# Patient Record
Sex: FEMALE | Race: OTHER | NOT HISPANIC OR LATINO | URBAN - METROPOLITAN AREA
[De-identification: names, ages, dates, MRNs, and addresses within clinical notes are randomized per-mention and may not be internally consistent; named-entity substitution may affect disease eponyms.]

---

## 2017-07-04 ENCOUNTER — INPATIENT (INPATIENT)
Facility: HOSPITAL | Age: 72
LOS: 1 days | Discharge: HOME | DRG: 566 | End: 2017-07-06
Attending: INTERNAL MEDICINE | Admitting: HOSPITALIST
Payer: MEDICAID

## 2017-07-04 VITALS
RESPIRATION RATE: 16 BRPM | DIASTOLIC BLOOD PRESSURE: 80 MMHG | TEMPERATURE: 100 F | SYSTOLIC BLOOD PRESSURE: 150 MMHG | OXYGEN SATURATION: 96 % | HEART RATE: 84 BPM

## 2017-07-04 LAB
ALBUMIN SERPL ELPH-MCNC: 4.4 G/DL — SIGNIFICANT CHANGE UP (ref 3.3–5)
ALP SERPL-CCNC: 146 U/L — HIGH (ref 40–120)
ALT FLD-CCNC: 38 U/L RC — SIGNIFICANT CHANGE UP (ref 10–45)
ANION GAP SERPL CALC-SCNC: 14 MMOL/L — SIGNIFICANT CHANGE UP (ref 5–17)
APPEARANCE UR: CLEAR — SIGNIFICANT CHANGE UP
AST SERPL-CCNC: 29 U/L — SIGNIFICANT CHANGE UP (ref 10–40)
BASOPHILS # BLD AUTO: 0.1 K/UL — SIGNIFICANT CHANGE UP (ref 0–0.2)
BASOPHILS NFR BLD AUTO: 0.5 % — SIGNIFICANT CHANGE UP (ref 0–2)
BILIRUB SERPL-MCNC: 0.6 MG/DL — SIGNIFICANT CHANGE UP (ref 0.2–1.2)
BILIRUB UR-MCNC: NEGATIVE — SIGNIFICANT CHANGE UP
BUN SERPL-MCNC: 13 MG/DL — SIGNIFICANT CHANGE UP (ref 7–23)
CALCIUM SERPL-MCNC: 10.1 MG/DL — SIGNIFICANT CHANGE UP (ref 8.4–10.5)
CHLORIDE SERPL-SCNC: 102 MMOL/L — SIGNIFICANT CHANGE UP (ref 96–108)
CO2 SERPL-SCNC: 23 MMOL/L — SIGNIFICANT CHANGE UP (ref 22–31)
COLOR SPEC: YELLOW — SIGNIFICANT CHANGE UP
CREAT SERPL-MCNC: 1.22 MG/DL — SIGNIFICANT CHANGE UP (ref 0.5–1.3)
DIFF PNL FLD: NEGATIVE — SIGNIFICANT CHANGE UP
EOSINOPHIL # BLD AUTO: 0 K/UL — SIGNIFICANT CHANGE UP (ref 0–0.5)
EOSINOPHIL NFR BLD AUTO: 0.3 % — SIGNIFICANT CHANGE UP (ref 0–6)
EPI CELLS # UR: SIGNIFICANT CHANGE UP /HPF
GAS PNL BLDV: SIGNIFICANT CHANGE UP
GLUCOSE SERPL-MCNC: 125 MG/DL — HIGH (ref 70–99)
GLUCOSE UR QL: NEGATIVE — SIGNIFICANT CHANGE UP
HCT VFR BLD CALC: 42.4 % — SIGNIFICANT CHANGE UP (ref 34.5–45)
HGB BLD-MCNC: 14.5 G/DL — SIGNIFICANT CHANGE UP (ref 11.5–15.5)
KETONES UR-MCNC: NEGATIVE — SIGNIFICANT CHANGE UP
LEUKOCYTE ESTERASE UR-ACNC: NEGATIVE — SIGNIFICANT CHANGE UP
LYMPHOCYTES # BLD AUTO: 1.4 K/UL — SIGNIFICANT CHANGE UP (ref 1–3.3)
LYMPHOCYTES # BLD AUTO: 12.3 % — LOW (ref 13–44)
MCHC RBC-ENTMCNC: 32.7 PG — SIGNIFICANT CHANGE UP (ref 27–34)
MCHC RBC-ENTMCNC: 34.3 GM/DL — SIGNIFICANT CHANGE UP (ref 32–36)
MCV RBC AUTO: 95.5 FL — SIGNIFICANT CHANGE UP (ref 80–100)
MONOCYTES # BLD AUTO: 0.9 K/UL — SIGNIFICANT CHANGE UP (ref 0–0.9)
MONOCYTES NFR BLD AUTO: 8.2 % — SIGNIFICANT CHANGE UP (ref 2–14)
NEUTROPHILS # BLD AUTO: 8.6 K/UL — HIGH (ref 1.8–7.4)
NEUTROPHILS NFR BLD AUTO: 78.7 % — HIGH (ref 43–77)
NITRITE UR-MCNC: NEGATIVE — SIGNIFICANT CHANGE UP
PH UR: 6.5 — SIGNIFICANT CHANGE UP (ref 5–8)
PLATELET # BLD AUTO: 281 K/UL — SIGNIFICANT CHANGE UP (ref 150–400)
POTASSIUM SERPL-MCNC: 4 MMOL/L — SIGNIFICANT CHANGE UP (ref 3.5–5.3)
POTASSIUM SERPL-SCNC: 4 MMOL/L — SIGNIFICANT CHANGE UP (ref 3.5–5.3)
PROT SERPL-MCNC: 7.9 G/DL — SIGNIFICANT CHANGE UP (ref 6–8.3)
PROT UR-MCNC: NEGATIVE — SIGNIFICANT CHANGE UP
RBC # BLD: 4.44 M/UL — SIGNIFICANT CHANGE UP (ref 3.8–5.2)
RBC # FLD: 11.1 % — SIGNIFICANT CHANGE UP (ref 10.3–14.5)
SODIUM SERPL-SCNC: 139 MMOL/L — SIGNIFICANT CHANGE UP (ref 135–145)
SP GR SPEC: 1.01 — SIGNIFICANT CHANGE UP (ref 1.01–1.02)
UROBILINOGEN FLD QL: NEGATIVE — SIGNIFICANT CHANGE UP
WBC # BLD: 11 K/UL — HIGH (ref 3.8–10.5)
WBC # FLD AUTO: 11 K/UL — HIGH (ref 3.8–10.5)

## 2017-07-04 PROCEDURE — 76377 3D RENDER W/INTRP POSTPROCES: CPT | Mod: 26

## 2017-07-04 PROCEDURE — 99285 EMERGENCY DEPT VISIT HI MDM: CPT

## 2017-07-04 PROCEDURE — 73502 X-RAY EXAM HIP UNI 2-3 VIEWS: CPT | Mod: 26,LT

## 2017-07-04 PROCEDURE — 74177 CT ABD & PELVIS W/CONTRAST: CPT | Mod: 26

## 2017-07-04 PROCEDURE — 72192 CT PELVIS W/O DYE: CPT | Mod: 26,59

## 2017-07-04 RX ORDER — DIPHENHYDRAMINE HCL 50 MG
50 CAPSULE ORAL ONCE
Qty: 0 | Refills: 0 | Status: COMPLETED | OUTPATIENT
Start: 2017-07-04 | End: 2017-07-04

## 2017-07-04 RX ORDER — ACETAMINOPHEN 500 MG
1000 TABLET ORAL ONCE
Qty: 0 | Refills: 0 | Status: COMPLETED | OUTPATIENT
Start: 2017-07-04 | End: 2017-07-04

## 2017-07-04 RX ORDER — SODIUM CHLORIDE 9 MG/ML
1000 INJECTION INTRAMUSCULAR; INTRAVENOUS; SUBCUTANEOUS ONCE
Qty: 0 | Refills: 0 | Status: COMPLETED | OUTPATIENT
Start: 2017-07-04 | End: 2017-07-04

## 2017-07-04 RX ADMIN — Medication 400 MILLIGRAM(S): at 20:29

## 2017-07-04 RX ADMIN — Medication 1000 MILLIGRAM(S): at 21:00

## 2017-07-04 RX ADMIN — Medication 50 MILLIGRAM(S): at 23:57

## 2017-07-04 RX ADMIN — SODIUM CHLORIDE 1000 MILLILITER(S): 9 INJECTION INTRAMUSCULAR; INTRAVENOUS; SUBCUTANEOUS at 20:29

## 2017-07-04 NOTE — ED ADULT NURSE NOTE - OBJECTIVE STATEMENT
70 y/o F presents to ED c/o sudden onset L groin pain when she woke up, 7/10, has been constant, worse with movement, pt reports when she first woke up she could not walk, pt does not use any assistive devices with ambulation normally, pt now able to walk independently but with increased pain to L groin with ambulation. L groin pain increases when pt is laying in stretcher and tries to straighten leg, pt prefers L knee to be bent for comfort. L groin/LLQ TTP. Pt denies headache, dizziness, chest pain, palpitations, cough, SOB, abdominal pain, n/v/d, urinary symptoms, fevers, chills, weakness at this time. Pt has pos and equal sensation to extremities bilat, pos and equal strength to extremities x 4, strong peripheral pulses x 4, no numbness/tingling. Family at bedside, safety maintained. Pt drinking for CT.

## 2017-07-04 NOTE — ED PROVIDER NOTE - OBJECTIVE STATEMENT
71 year old female past medical history hyperlipidemia (diet controlled), presents to the ED for left hip pain. woke up today with left sided groin pain, 7/10, non-radiating, "pain," worse with movement, better when knee is bent, worse when straightening leg.  no associated paresthesias. No associated swelling, rash. No trauma. Patient visiting from [place], was doing a lot of errands yesterday. No fevers, nausea, vomiting, back pain, abdominal pain.     PCP: Binh Strickladn

## 2017-07-04 NOTE — ED PROVIDER NOTE - ATTENDING CONTRIBUTION TO CARE
attending Rin: 71yF h/o HLD with one day of atraumatic L hip pain. Denies fever/chills. Ambulating but with pain. No pain meds prior to arrival. On examination, uncomfortable 2/2 pain, 2+ pedal pulses b/l, warm lower extremities with <2 sec cap refill, limited ROM L hip, tenderness over lateral L hip, pelvis stable, mild LLQ tenderness, no hernias, no rashes. Rectal temperature 99.9 after IV Tylenol so possible fever upon arrival? Concern for abdominal process with referred L hip pain vs L hip pathology (fx vs septic joint?). Will obtain labwork, urinalysis, CTAP, pelvis xrays, analgesia and likely admission.

## 2017-07-04 NOTE — ED PROVIDER NOTE - MEDICAL DECISION MAKING DETAILS
71 year old female past medical history hyperlipidemia (diet controlled), presents to the ED for left hip pain. Atraumatic, also tender left lower quadrant, unclear if left hip pain vs left lower quadrant pain. Will obtain labwork, xray pelvis if no obvious fracture will CT A/P and of hip. pain control.

## 2017-07-04 NOTE — ED ADULT NURSE NOTE - CHPI ED SYMPTOMS NEG
no nausea/no fever/no vomiting/no burning urination/no chills/no hematuria/no abdominal distension/no dysuria/no blood in stool/no diarrhea

## 2017-07-04 NOTE — ED PROVIDER NOTE - PROGRESS NOTE DETAILS
after IV contrast patient reports she feels itchy on her head. Do not suspect allergic reaction. No shortness of breath, nausea, vomiting, swelling, abdominal pain, rash. Will give Benadryl and reeval. Patient was endorsed to me by Dr. Gar at the usual hour of signout to follow up results of ct scan / orthopedics consult and dispo pending these results and re-evaluation. At this time the patient  remains unable to walk and with pain to rom of L hip. Will give abx if they rec. admit to medicine. -Rk Lora MD- primary medical doctor paged    Ortho recommending admission for r/o septic joint vs occult fracture with MRI, recommending holding abx in case for bx

## 2017-07-05 DIAGNOSIS — Z29.9 ENCOUNTER FOR PROPHYLACTIC MEASURES, UNSPECIFIED: ICD-10-CM

## 2017-07-05 DIAGNOSIS — M25.552 PAIN IN LEFT HIP: ICD-10-CM

## 2017-07-05 DIAGNOSIS — E78.5 HYPERLIPIDEMIA, UNSPECIFIED: ICD-10-CM

## 2017-07-05 DIAGNOSIS — R26.2 DIFFICULTY IN WALKING, NOT ELSEWHERE CLASSIFIED: ICD-10-CM

## 2017-07-05 LAB
APTT BLD: 32.1 SEC — SIGNIFICANT CHANGE UP (ref 27.5–37.4)
INR BLD: 1.06 RATIO — SIGNIFICANT CHANGE UP (ref 0.88–1.16)
PROTHROM AB SERPL-ACNC: 11.5 SEC — SIGNIFICANT CHANGE UP (ref 9.8–12.7)

## 2017-07-05 PROCEDURE — 93010 ELECTROCARDIOGRAM REPORT: CPT

## 2017-07-05 PROCEDURE — 72197 MRI PELVIS W/O & W/DYE: CPT | Mod: 26

## 2017-07-05 PROCEDURE — 99223 1ST HOSP IP/OBS HIGH 75: CPT | Mod: AI

## 2017-07-05 PROCEDURE — 12345: CPT | Mod: NC

## 2017-07-05 RX ORDER — ENOXAPARIN SODIUM 100 MG/ML
30 INJECTION SUBCUTANEOUS EVERY 24 HOURS
Qty: 0 | Refills: 0 | Status: DISCONTINUED | OUTPATIENT
Start: 2017-07-05 | End: 2017-07-06

## 2017-07-05 RX ORDER — IBUPROFEN 200 MG
400 TABLET ORAL THREE TIMES A DAY
Qty: 0 | Refills: 0 | Status: DISCONTINUED | OUTPATIENT
Start: 2017-07-05 | End: 2017-07-06

## 2017-07-05 RX ORDER — IBUPROFEN 200 MG
400 TABLET ORAL
Qty: 0 | Refills: 0 | Status: COMPLETED | OUTPATIENT
Start: 2017-07-05 | End: 2017-07-06

## 2017-07-05 RX ADMIN — ENOXAPARIN SODIUM 30 MILLIGRAM(S): 100 INJECTION SUBCUTANEOUS at 06:36

## 2017-07-05 RX ADMIN — Medication 400 MILLIGRAM(S): at 17:44

## 2017-07-05 RX ADMIN — Medication 400 MILLIGRAM(S): at 17:14

## 2017-07-05 NOTE — PROGRESS NOTE ADULT - ASSESSMENT
71 F Hx HLD (diet controlled) p/w acute L hip pain admitted for difficulty ambulation due to acute L hip pain, improving

## 2017-07-05 NOTE — H&P ADULT - ASSESSMENT
71 F Hx HLD p/w acute L hip pain admitted difficulty ambulation due to acute L hip pain, to rule out septic joint and for pain control. 71 F Hx HLD (diet controlled) p/w acute L hip pain admitted for difficulty ambulation due to acute L hip pain, to rule out septic joint and for pain control.

## 2017-07-05 NOTE — CHART NOTE - NSCHARTNOTEFT_GEN_A_CORE
Hospitalist Attending Addendum:  Spoke to ortho s/p MRI.  Due to elevated ESR, small effusion on left hip, ortho recommended IR guided aspiration.  Will arrange.

## 2017-07-05 NOTE — H&P ADULT - HISTORY OF PRESENT ILLNESS
71 F Hx HLD p/w L hip pain 71 F Hx HLD (diet controlled) p/w acute L hip pain.  Patient was in her usual state of health, no limitation in mobility, physically active, jogs regularly, visited daughter in [place] for a moth, returned home to [place] July 1, went to a park to play basketball with grandkids on July 3 for 30 min.  Patient woke up the morning of July 4 with severe 8/10 L hip pain, radiation to medial/inner thigh, worsen with movement, improved w/o movement, resulting in inability to ambulate, came to ED for further evaluation.  No trauma, no fever/chill, no manipulation or injection of L hip.

## 2017-07-05 NOTE — PROGRESS NOTE ADULT - SUBJECTIVE AND OBJECTIVE BOX
PAVEL LEYVA  71y  Female      Patient is a 71y old  Female who presents with a chief complaint of L hip pain (2017 04:45)      INTERVAL HPI/OVERNIGHT EVENTS: No acute events overngiht.  Patient states pain much improved. Pain localized to left groin area, worse when going from sitting to standing position, now able to bear weight.  Patient denies fever, chills, chest pain, SOB, abdominal pain, n/v/d/c, dysuria.  Translated through son at bedside.    VS:   T(C): 37.2 (17 @ 13:44), Max: 37.8 (17 @ 18:54)  HR: 71 (17 @ 13:44) (61 - 85)  BP: 133/75 (17 @ 13:44) (128/75 - 172/87)  RR: 16 (17 @ 13:44) (16 - 18)  SpO2: 96% (17 @ 13:44) (96% - 99%)  Wt(kg): --Vital Signs Last 24 Hrs  T(C): 37.2 (2017 13:44), Max: 37.8 (2017 18:54)  T(F): 99 (2017 13:44), Max: 100 (2017 18:54)  HR: 71 (2017 13:44) (61 - 85)  BP: 133/75 (2017 13:44) (128/75 - 172/87)  BP(mean): --  RR: 16 (:44) (16 - 18)  SpO2: 96% (:44) (96% - 99%)  Wt(kg): --    PHYSICAL EXAM:  GENERAL: NAD, well-groomed, well-developed  HEAD:  Atraumatic, Normocephalic  EYES: EOMI, PERRLA, conjunctiva and sclera clear  ENMT: No tonsillar erythema, exudates,; Moist mucous membranes. No lesions  NECK: Supple, No JVD, Normal thyroid  CHEST/LUNG: Clear to percussion bilaterally; No rales, rhonchi, wheezing, or rubs  HEART: Regular rate and rhythm; No murmurs, rubs, or gallops  ABDOMEN: Soft, Nontender, Nondistended; Bowel sounds present.  No hepatosplenomegaly  EXTREMITIES:  2+ Peripheral Pulses, No clubbing, cyanosis, or edema  MSK: No lateral trochanter tenderness to palpation.  No pain to palpation upon palpation in groin area.  Ambulated patient personally.  Pain with standing.  Able to bear weight with minimal pain to left groin area.     NERVOUS SYSTEM:  ; Motor Strength 5/5 B/L upper and lower extremities.      Consultant(s) Notes Reviewed:  [x ] YES  [ ] NO  Care Discussed with Consultants/Other Providers [ x] YES  [ ] NO: Orthopedic resident    LABS:                        14.5   11.0  )-----------( 281      ( 2017 21:03 )             42.4     07-    139  |  102  |  13  ----------------------------<  125<H>  4.0   |  23  |  1.22    Ca    10.1      2017 21:03    TPro  7.9  /  Alb  4.4  /  TBili  0.6  /  DBili  x   /  AST  29  /  ALT  38  /  AlkPhos  146<H>  07-04      Urinalysis Basic - ( 2017 20:22 )    Color: Yellow / Appearance: Clear / S.010 / pH: x  Gluc: x / Ketone: Negative  / Bili: Negative / Urobili: Negative   Blood: x / Protein: Negative / Nitrite: Negative   Leuk Esterase: Negative / RBC: x / WBC x   Sq Epi: x / Non Sq Epi: OCC /HPF / Bacteria: x    Urinalysis Basic - ( 2017 20:22 )    Color: Yellow / Appearance: Clear / S.010 / pH: x  Gluc: x / Ketone: Negative  / Bili: Negative / Urobili: Negative   Blood: x / Protein: Negative / Nitrite: Negative   Leuk Esterase: Negative / RBC: x / WBC x   Sq Epi: x / Non Sq Epi: OCC /HPF / Bacteria: x        RADIOLOGY & ADDITIONAL TESTS:    Imaging Personally Reviewed:  [ ] YES  [ ] NO: CT pelvis: Small left hip effusion.  No acute fracture.

## 2017-07-05 NOTE — PROGRESS NOTE ADULT - PROBLEM SELECTOR PLAN 1
likely exacerbated by basketball the day prior which she hasn't play for over 10 yrs, possible bursitis   - s/p IV acetaminophen 1gm and ibuprofen with significant improvement with ability to ambulate.  - discussed with orthopedics, MRI performed, they will review scan for possibel septic joint, though given improvement overall presentation, low on differential.

## 2017-07-05 NOTE — H&P ADULT - PROBLEM SELECTOR PLAN 1
likely exacerbated by basketball the day prior which she hasn't play for over 10 yrs, possible bursitis   - s/p IV acetaminophen 1gm with significant improvement of pain and ROM, only mild pain in L groin with weight bearing and ambulation currently  - will control pain with Motrin (w/ food) prn  - Ortho eval appreciated, will check MRI to r/o septic joint with elevated ESR and L hip effusion  - will recheck ESR in am

## 2017-07-05 NOTE — ED ADULT NURSE REASSESSMENT NOTE - GENERAL PATIENT STATE
comfortable appearance/improvement verbalized/cooperative/smiling/interactive/resting/sleeping/family/SO at bedside

## 2017-07-05 NOTE — ED ADULT NURSE REASSESSMENT NOTE - NS ED NURSE REASSESS COMMENT FT1
Pt AAOx4, NAD, resp nonlabored, resting comfortably in bed with family at bedside. Pt c/o 0/10 L groin pain at rest, 5/10 with movement. Pt able to ambulate but has increased pain to L groin with ambulation. Pt has pos and equal sensation to extremities bilat, strong peripheral pulses x 4, no numbness/tingling.  Pt denies headache, dizziness, chest pain, palpitations, SOB, abd pain, n/v/d, urinary/bowel symptoms, fevers, chills, weakness at this time. Pt awaiting dispo/admit. Ortho just finished consult at bedside. Safety maintained. Family at bedside. Pt AAOx4, NAD, resp nonlabored, resting comfortably in bed with family at bedside. Pt c/o 0/10 L groin pain at rest, 5/10 with movement. Pt able to ambulate but has increased pain to L groin with ambulation. Pt has pos and equal sensation to extremities bilat, strong peripheral pulses x 4, no numbness/tingling. Pt reports mild itchiness to back of head after CT scan with IV contrast, MD Grajeda aware, will medicate pt as ordered. No SOB, dyspnea, hives/redness, throat/tongue swelling/itchiness, or itchiness to body noted, only itchiness on back of head. Pt denies headache, dizziness, chest pain, palpitations, SOB, abd pain, n/v/d, urinary/bowel symptoms, fevers, chills, weakness at this time. Pt awaiting dispo/admit. Ortho just finished consult at bedside. Safety maintained. Family at bedside. Pt AAOx4, NAD, resp nonlabored, resting comfortably in bed with family at bedside. Pt c/o 0/10 L groin pain at rest, 5/10 with movement. Pt able to ambulate but has increased pain to L groin with ambulation. Pt has pos and equal sensation to extremities bilat, strong peripheral pulses x 4, no numbness/tingling. Pt reports mild itchiness to back of head after CT scan with IV contrast, MD Grajeda aware, will medicate pt as ordered. No SOB, dyspnea, hives/redness, throat/tongue swelling/itchiness, or itchiness to body noted, only itchiness on back of head. Pt denies headache, dizziness, chest pain, palpitations, SOB, abd pain, n/v/d, urinary/bowel symptoms, fevers, chills, weakness at this time. Pt awaiting dispo/admit. Ortho just finished consult at bedside. Safety maintained. Family at bedside.    0110: Pt reports improvement in posterior scalp itchiness. No other changes observed. Report given to ED Holding HERBER Roberts. Safety maintained. Pt AAOx4, NAD, resp nonlabored, resting comfortably in bed with family at bedside. Pt c/o 0/10 L groin pain at rest, 5/10 with movement. Pt able to ambulate but has increased pain to L groin with ambulation. Pt has pos and equal sensation to extremities bilat, strong peripheral pulses x 4, no numbness/tingling. Pt reports mild itchiness to back of head after CT scan with IV contrast, MD Grajeda aware, will medicate pt as ordered. No SOB, dyspnea, hives/redness, throat/tongue swelling/itchiness, or itchiness to body noted, only itchiness on back of head. Pt denies headache, dizziness, chest pain, palpitations, SOB, abd pain, n/v/d, urinary/bowel symptoms, fevers, chills, weakness at this time. Pt awaiting dispo/admit. Ortho just finished consult at bedside. Safety maintained. Family at bedside.    0110: Pt reports improvement in posterior scalp itchiness. No other changes observed. Report given to Eric Julio RN. Safety maintained. Pt awaiting transport to floor.

## 2017-07-05 NOTE — CONSULT NOTE ADULT - SUBJECTIVE AND OBJECTIVE BOX
General
Orthopaedic Surgery Consult Note    Patient is a 71y old  Female who presents with a chief complaint of   HPI:      PAST MEDICAL & SURGICAL HISTORY:  No pertinent past medical history    [] No significant past history as reviewed with the patient and family    FAMILY HISTORY:    [] Family history not pertinent as reviewed with the patient and family    SOCIAL HISTORY:    MEDICATIONS  (STANDING):    MEDICATIONS  (PRN):    Allergies    No Known Allergies    Intolerances        REVIEW OF SYSTEMS  [x] All review of systems negative except for those marked.  Systemic:	[] Fever		[] Chills		[] Night sweats		[] Fatigue	[] Other  [] Cardiovascular:  [] Pulmonary:  [] Renal/Urologic:  [] Gastrointestinal:  [] Metabolic:  [] Neurologic:  [] Hematologic:  [] ENT:  [] Ophthalmologic:  [] Musculoskeletal: see HPI    Vital Signs Last 24 Hrs  T(C): 37.2 (2017 23:59), Max: 37.8 (2017 18:54)  T(F): 98.9 (2017 23:59), Max: 100 (2017 18:54)  HR: 68 (2017 23:59) (68 - 85)  BP: 149/83 (2017 23:59) (149/83 - 172/87)  BP(mean): --  RR: 18 (2017 23:59) (16 - 18)  SpO2: 97% (2017 23:59) (96% - 98%)      PHYSICAL EXAM:  NAD  LLE: ER/IR 0-15, Flex 30; No erythema;  No TTP over greater troch  EHL/FHL/TA/GS intact  SILT DP/SP/Leslie/Sa  WWP Dp palpable                          14.5   11.0  )-----------( 281      ( 2017 21:03 )             42.4     07-04    139  |  102  |  13  ----------------------------<  125<H>  4.0   |  23  |  1.22    Ca    10.1      2017 21:03    TPro  7.9  /  Alb  4.4  /  TBili  0.6  /  DBili  x   /  AST  29  /  ALT  38  /  AlkPhos  146<H>  07-04      Urinalysis Basic - ( 2017 20:22 )    Color: Yellow / Appearance: Clear / S.010 / pH: x  Gluc: x / Ketone: Negative  / Bili: Negative / Urobili: Negative   Blood: x / Protein: Negative / Nitrite: Negative   Leuk Esterase: Negative / RBC: x / WBC x   Sq Epi: x / Non Sq Epi: OCC /HPF / Bacteria: x      ESR 51  IMAGING STUDIES:  X-ray pelvis/left hip: no fracture or dislocation  CT Pelvis: no fracture or dislocation; Diverticulosis    71y Female with left hip pain and inability to ambulate.  Possible left septic hip.   Recommend MRI to rule out effusion  Pain Control  WBAT  Will follow  DVT PPX

## 2017-07-06 VITALS
RESPIRATION RATE: 18 BRPM | TEMPERATURE: 98 F | SYSTOLIC BLOOD PRESSURE: 143 MMHG | OXYGEN SATURATION: 96 % | DIASTOLIC BLOOD PRESSURE: 78 MMHG | HEART RATE: 63 BPM

## 2017-07-06 LAB
ANION GAP SERPL CALC-SCNC: 13 MMOL/L — SIGNIFICANT CHANGE UP (ref 5–17)
BUN SERPL-MCNC: 16 MG/DL — SIGNIFICANT CHANGE UP (ref 7–23)
CALCIUM SERPL-MCNC: 9.7 MG/DL — SIGNIFICANT CHANGE UP (ref 8.4–10.5)
CHLORIDE SERPL-SCNC: 105 MMOL/L — SIGNIFICANT CHANGE UP (ref 96–108)
CO2 SERPL-SCNC: 23 MMOL/L — SIGNIFICANT CHANGE UP (ref 22–31)
CREAT SERPL-MCNC: 1.06 MG/DL — SIGNIFICANT CHANGE UP (ref 0.5–1.3)
ERYTHROCYTE [SEDIMENTATION RATE] IN BLOOD: 71 MM/HR — HIGH (ref 0–20)
GLUCOSE SERPL-MCNC: 88 MG/DL — SIGNIFICANT CHANGE UP (ref 70–99)
HCT VFR BLD CALC: 43 % — SIGNIFICANT CHANGE UP (ref 34.5–45)
HGB BLD-MCNC: 14.1 G/DL — SIGNIFICANT CHANGE UP (ref 11.5–15.5)
INR BLD: 1.04 RATIO — SIGNIFICANT CHANGE UP (ref 0.88–1.16)
MCHC RBC-ENTMCNC: 31.5 PG — SIGNIFICANT CHANGE UP (ref 27–34)
MCHC RBC-ENTMCNC: 32.8 GM/DL — SIGNIFICANT CHANGE UP (ref 32–36)
MCV RBC AUTO: 96.1 FL — SIGNIFICANT CHANGE UP (ref 80–100)
PLATELET # BLD AUTO: 272 K/UL — SIGNIFICANT CHANGE UP (ref 150–400)
POTASSIUM SERPL-MCNC: 4 MMOL/L — SIGNIFICANT CHANGE UP (ref 3.5–5.3)
POTASSIUM SERPL-SCNC: 4 MMOL/L — SIGNIFICANT CHANGE UP (ref 3.5–5.3)
PROTHROM AB SERPL-ACNC: 11.2 SEC — SIGNIFICANT CHANGE UP (ref 9.8–12.7)
RBC # BLD: 4.47 M/UL — SIGNIFICANT CHANGE UP (ref 3.8–5.2)
RBC # FLD: 11.2 % — SIGNIFICANT CHANGE UP (ref 10.3–14.5)
SODIUM SERPL-SCNC: 141 MMOL/L — SIGNIFICANT CHANGE UP (ref 135–145)
WBC # BLD: 5.5 K/UL — SIGNIFICANT CHANGE UP (ref 3.8–10.5)
WBC # FLD AUTO: 5.5 K/UL — SIGNIFICANT CHANGE UP (ref 3.8–10.5)

## 2017-07-06 PROCEDURE — 10022: CPT

## 2017-07-06 PROCEDURE — 76942 ECHO GUIDE FOR BIOPSY: CPT | Mod: 26

## 2017-07-06 PROCEDURE — 99239 HOSP IP/OBS DSCHRG MGMT >30: CPT

## 2017-07-06 RX ORDER — IBUPROFEN 200 MG
1 TABLET ORAL
Qty: 30 | Refills: 0
Start: 2017-07-06 | End: 2017-07-16

## 2017-07-06 RX ADMIN — Medication 400 MILLIGRAM(S): at 05:06

## 2017-07-06 RX ADMIN — ENOXAPARIN SODIUM 30 MILLIGRAM(S): 100 INJECTION SUBCUTANEOUS at 05:49

## 2017-07-06 NOTE — DISCHARGE NOTE ADULT - CARE PLAN
Principal Discharge DX:	Hip pain, acute, left  Goal:	S/P aspiration of left hip  Instructions for follow-up, activity and diet:	Continue Motrin as directed and f/u with orthopedics in one week Principal Discharge DX:	Hip pain, acute, left  Goal:	S/P aspiration of left hip  Assessment and plan of treatment:	Continue Motrin as directed and f/u with orthopedics in one week.

## 2017-07-06 NOTE — PROGRESS NOTE ADULT - SUBJECTIVE AND OBJECTIVE BOX
IR aspiration performed today - spoke w/IR attending - a scant amount of fluid removed. Only enough to be sent for culture. No surgical intervention at this time unless cultures positive.

## 2017-07-06 NOTE — DISCHARGE NOTE ADULT - PROVIDER TOKENS
FREE:[LAST:[Orthopedic surgery],PHONE:[(   )    -],FAX:[(   )    -],ADDRESS:[[address] [place] [address]  Phone: [phone]  Fax: [phone]]] TOKEN:'[id number]'

## 2017-07-06 NOTE — PHYSICAL THERAPY INITIAL EVALUATION ADULT - PERTINENT HX OF CURRENT PROBLEM, REHAB EVAL
71 F Hx HLD (diet controlled) p/w acute L hip pain admitted for difficulty ambulation due to acute L hip pain, to rule out septic joint and for pain control. XR of Hip [07/04]:  no fracture or dislocation. CT Pelvis: [07/04]:  no fracture or dislocation; Diverticulosis.

## 2017-07-06 NOTE — DISCHARGE NOTE ADULT - MEDICATION SUMMARY - MEDICATIONS TO TAKE
I will START or STAY ON the medications listed below when I get home from the hospital:    ibuprofen 400 mg oral tablet  -- 1 tab(s) by mouth 3 times a day, As needed, moderate pain  -- Indication: For Hip pain, acute, left

## 2017-07-06 NOTE — DISCHARGE NOTE ADULT - PLAN OF CARE
S/P aspiration of left hip Continue Motrin as directed and f/u with orthopedics in one week Continue Motrin as directed and f/u with orthopedics in one week.

## 2017-07-06 NOTE — PROGRESS NOTE ADULT - SUBJECTIVE AND OBJECTIVE BOX
Interventional Radiology  Pre-Procedure Note    HPI:  71 F Hx HLD (diet controlled) p/w acute L hip pain, referred for left hip joint aspiration.  Patient was in her usual state of health, no limitation in mobility, physically active, jogs regularly, visited daughter in [place] for a moth, returned home to [place] July 1, went to a park to play basketball with grandkids on July 3 for 30 min.  Patient woke up the morning of July 4 with severe 8/10 L hip pain, radiation to medial/inner thigh, worsen with movement, improved w/o movement, resulting in inability to ambulate, came to ED for further evaluation.  No trauma, no fever/chill, no manipulation or injection of L hip. (05 Jul 2017 04:45)        PAST MEDICAL & SURGICAL HISTORY:  HLD (hyperlipidemia)  No significant past surgical history      Social History:     FAMILY HISTORY:  No pertinent family history in first degree relatives      Allergies: No Known Allergies      Current Medications: ibuprofen  Tablet 400 milliGRAM(s) Oral three times a day PRN  enoxaparin Injectable 30 milliGRAM(s) SubCutaneous every 24 hours      Labs:                         14.1   5.5   )-----------( 272      ( 06 Jul 2017 06:55 )             43.0       07-06    141  |  105  |  16  ----------------------------<  88  4.0   |  23  |  1.06    Ca    9.7      06 Jul 2017 06:55    TPro  7.9  /  Alb  4.4  /  TBili  0.6  /  DBili  x   /  AST  29  /  ALT  38  /  AlkPhos  146<H>  07-04    MRI pelvis demonstrated moderate left hip joint effusion.      Assessment/Plan:   71 year old female referred for left hip aspiration to rule out septic arthritis.

## 2017-07-06 NOTE — DISCHARGE NOTE ADULT - CARE PROVIDER_API CALL
Orthopedic surgery,   [address] [place] [id number]  Phone: [phone]  Fax: [phone]  Phone: (   )    -  Fax: (   )    - Jass Samuel), Orthopaedic Surgery  [address] [place] [id number]  Phone: [phone]  Fax: [phone]

## 2017-07-06 NOTE — PROGRESS NOTE ADULT - SUBJECTIVE AND OBJECTIVE BOX
Interventional Radiology Brief- Operative Note    Procedure: Left hip aspiration    Operators: Nasima    Anesthesia (type): 1% lidocaine local    Contrast: none    EBL:none     Findings/Follow up Plan of Care: US guided FNA of left hip effusion performed. Small amount of clear pink fluid able to be aspirated. Sample sent to microbiology.    Specimens Removed: as above    Implants:none    Complications:none    Condition/Disposition: stable/room    Please call Interventional Radiology x [phone] with any questions, concerns, or issues.

## 2017-07-06 NOTE — PROGRESS NOTE ADULT - SUBJECTIVE AND OBJECTIVE BOX
Discussed w/Dr Samuel - pt OK to leave with close follow up next week and the cultures followed by the primary team.

## 2017-07-06 NOTE — PROGRESS NOTE ADULT - SUBJECTIVE AND OBJECTIVE BOX
HPI:  71 F Hx a/w left hip pain found to have left hip joint effusion elevated ESR / CRP presented to IR for left hip aspiration.    Allergies:No Known Allergies      PAST MEDICAL & SURGICAL HISTORY:  HLD (hyperlipidemia)  No significant past surgical history        Pertinent labs:                      14.1   5.5   )-----------( 272      ( 06 Jul 2017 06:55 )             43.0   07-06    141  |  105  |  16  ----------------------------<  88  4.0   |  23  |  1.06    Ca    9.7      06 Jul 2017 06:55    TPro  7.9  /  Alb  4.4  /  TBili  0.6  /  DBili  x   /  AST  29  /  ALT  38  /  AlkPhos  146<H>  07-04  PT/INR - ( 05 Jul 2017 17:50 )   PT: 11.5 sec;   INR: 1.06 ratio         PTT - ( 05 Jul 2017 17:50 )  PTT:32.1 sec    Consent: Procedure/risks/ Benefits explained. Informed consent obtained. Pt verbalizes understanding.

## 2017-07-06 NOTE — DISCHARGE NOTE ADULT - CONDITIONS AT DISCHARGE
Patient safety maintained. Patient VSS. Patient IV removed with no signs/symptoms of redness/swelling.

## 2017-07-06 NOTE — DISCHARGE NOTE ADULT - PATIENT PORTAL LINK FT
“You can access the [facility] Patient Portal, offered by [facility], by registering with the following website: [email or web address]”

## 2017-12-15 PROCEDURE — 87075 CULTR BACTERIA EXCEPT BLOOD: CPT

## 2017-12-15 PROCEDURE — 83605 ASSAY OF LACTIC ACID: CPT

## 2017-12-15 PROCEDURE — 74177 CT ABD & PELVIS W/CONTRAST: CPT

## 2017-12-15 PROCEDURE — 87205 SMEAR GRAM STAIN: CPT

## 2017-12-15 PROCEDURE — 82435 ASSAY OF BLOOD CHLORIDE: CPT

## 2017-12-15 PROCEDURE — 76377 3D RENDER W/INTRP POSTPROCES: CPT

## 2017-12-15 PROCEDURE — 85610 PROTHROMBIN TIME: CPT

## 2017-12-15 PROCEDURE — 84132 ASSAY OF SERUM POTASSIUM: CPT

## 2017-12-15 PROCEDURE — 82803 BLOOD GASES ANY COMBINATION: CPT

## 2017-12-15 PROCEDURE — 81001 URINALYSIS AUTO W/SCOPE: CPT

## 2017-12-15 PROCEDURE — 82947 ASSAY GLUCOSE BLOOD QUANT: CPT

## 2017-12-15 PROCEDURE — 80048 BASIC METABOLIC PNL TOTAL CA: CPT

## 2017-12-15 PROCEDURE — 96374 THER/PROPH/DIAG INJ IV PUSH: CPT | Mod: XU

## 2017-12-15 PROCEDURE — 85014 HEMATOCRIT: CPT

## 2017-12-15 PROCEDURE — 85730 THROMBOPLASTIN TIME PARTIAL: CPT

## 2017-12-15 PROCEDURE — 72197 MRI PELVIS W/O & W/DYE: CPT

## 2017-12-15 PROCEDURE — 10022: CPT

## 2017-12-15 PROCEDURE — 87070 CULTURE OTHR SPECIMN AEROBIC: CPT

## 2017-12-15 PROCEDURE — A9585: CPT

## 2017-12-15 PROCEDURE — 85652 RBC SED RATE AUTOMATED: CPT

## 2017-12-15 PROCEDURE — 72192 CT PELVIS W/O DYE: CPT

## 2017-12-15 PROCEDURE — 82330 ASSAY OF CALCIUM: CPT

## 2017-12-15 PROCEDURE — 73502 X-RAY EXAM HIP UNI 2-3 VIEWS: CPT

## 2017-12-15 PROCEDURE — 84295 ASSAY OF SERUM SODIUM: CPT

## 2017-12-15 PROCEDURE — 86140 C-REACTIVE PROTEIN: CPT

## 2017-12-15 PROCEDURE — 80053 COMPREHEN METABOLIC PANEL: CPT

## 2017-12-15 PROCEDURE — 76942 ECHO GUIDE FOR BIOPSY: CPT

## 2017-12-15 PROCEDURE — 97161 PT EVAL LOW COMPLEX 20 MIN: CPT

## 2017-12-15 PROCEDURE — 85027 COMPLETE CBC AUTOMATED: CPT

## 2017-12-15 PROCEDURE — 99285 EMERGENCY DEPT VISIT HI MDM: CPT | Mod: 25

## 2017-12-15 PROCEDURE — 93005 ELECTROCARDIOGRAM TRACING: CPT

## 2022-02-02 ENCOUNTER — INPATIENT (INPATIENT)
Facility: HOSPITAL | Age: 77
LOS: 0 days | Discharge: HOME | DRG: 92 | End: 2022-02-03
Attending: STUDENT IN AN ORGANIZED HEALTH CARE EDUCATION/TRAINING PROGRAM | Admitting: STUDENT IN AN ORGANIZED HEALTH CARE EDUCATION/TRAINING PROGRAM
Payer: MEDICARE

## 2022-02-02 VITALS
TEMPERATURE: 99 F | HEIGHT: 61.42 IN | DIASTOLIC BLOOD PRESSURE: 95 MMHG | SYSTOLIC BLOOD PRESSURE: 208 MMHG | OXYGEN SATURATION: 95 % | HEART RATE: 89 BPM | WEIGHT: 125 LBS | RESPIRATION RATE: 18 BRPM

## 2022-02-02 LAB
ALBUMIN SERPL ELPH-MCNC: 3.9 G/DL — SIGNIFICANT CHANGE UP (ref 3.3–5)
ALP SERPL-CCNC: 173 U/L — HIGH (ref 40–120)
ALT FLD-CCNC: 21 U/L — SIGNIFICANT CHANGE UP (ref 10–45)
ANION GAP SERPL CALC-SCNC: 12 MMOL/L — SIGNIFICANT CHANGE UP (ref 5–17)
AST SERPL-CCNC: 27 U/L — SIGNIFICANT CHANGE UP (ref 10–40)
BASOPHILS # BLD AUTO: 0.03 K/UL — SIGNIFICANT CHANGE UP (ref 0–0.2)
BASOPHILS NFR BLD AUTO: 0.9 % — SIGNIFICANT CHANGE UP (ref 0–2)
BILIRUB SERPL-MCNC: 0.4 MG/DL — SIGNIFICANT CHANGE UP (ref 0.2–1.2)
BUN SERPL-MCNC: 8 MG/DL — SIGNIFICANT CHANGE UP (ref 7–23)
CALCIUM SERPL-MCNC: 9.5 MG/DL — SIGNIFICANT CHANGE UP (ref 8.4–10.5)
CHLORIDE SERPL-SCNC: 96 MMOL/L — SIGNIFICANT CHANGE UP (ref 96–108)
CK SERPL-CCNC: 40 U/L — SIGNIFICANT CHANGE UP (ref 25–170)
CO2 SERPL-SCNC: 24 MMOL/L — SIGNIFICANT CHANGE UP (ref 22–31)
CREAT SERPL-MCNC: 0.87 MG/DL — SIGNIFICANT CHANGE UP (ref 0.5–1.3)
D DIMER BLD IA.RAPID-MCNC: 435 NG/ML DDU — HIGH
EOSINOPHIL # BLD AUTO: 0 K/UL — SIGNIFICANT CHANGE UP (ref 0–0.5)
EOSINOPHIL NFR BLD AUTO: 0 % — SIGNIFICANT CHANGE UP (ref 0–6)
FLUAV AG NPH QL: SIGNIFICANT CHANGE UP
FLUBV AG NPH QL: SIGNIFICANT CHANGE UP
GIANT PLATELETS BLD QL SMEAR: PRESENT — SIGNIFICANT CHANGE UP
GLUCOSE SERPL-MCNC: 116 MG/DL — HIGH (ref 70–99)
HCT VFR BLD CALC: 37.3 % — SIGNIFICANT CHANGE UP (ref 34.5–45)
HGB BLD-MCNC: 12.4 G/DL — SIGNIFICANT CHANGE UP (ref 11.5–15.5)
LYMPHOCYTES # BLD AUTO: 2.88 K/UL — SIGNIFICANT CHANGE UP (ref 1–3.3)
LYMPHOCYTES # BLD AUTO: 82.7 % — HIGH (ref 13–44)
MAGNESIUM SERPL-MCNC: 2.2 MG/DL — SIGNIFICANT CHANGE UP (ref 1.6–2.6)
MANUAL SMEAR VERIFICATION: SIGNIFICANT CHANGE UP
MCHC RBC-ENTMCNC: 29 PG — SIGNIFICANT CHANGE UP (ref 27–34)
MCHC RBC-ENTMCNC: 33.2 GM/DL — SIGNIFICANT CHANGE UP (ref 32–36)
MCV RBC AUTO: 87.4 FL — SIGNIFICANT CHANGE UP (ref 80–100)
MONOCYTES # BLD AUTO: 0.54 K/UL — SIGNIFICANT CHANGE UP (ref 0–0.9)
MONOCYTES NFR BLD AUTO: 15.5 % — HIGH (ref 2–14)
NEUTROPHILS # BLD AUTO: 0.03 K/UL — LOW (ref 1.8–7.4)
NEUTROPHILS NFR BLD AUTO: 0.9 % — LOW (ref 43–77)
PHOSPHATE SERPL-MCNC: 3.1 MG/DL — SIGNIFICANT CHANGE UP (ref 2.5–4.5)
PLAT MORPH BLD: NORMAL — SIGNIFICANT CHANGE UP
PLATELET # BLD AUTO: 293 K/UL — SIGNIFICANT CHANGE UP (ref 150–400)
POTASSIUM SERPL-MCNC: 3.8 MMOL/L — SIGNIFICANT CHANGE UP (ref 3.5–5.3)
POTASSIUM SERPL-SCNC: 3.8 MMOL/L — SIGNIFICANT CHANGE UP (ref 3.5–5.3)
PROT SERPL-MCNC: 8.3 G/DL — SIGNIFICANT CHANGE UP (ref 6–8.3)
RBC # BLD: 4.27 M/UL — SIGNIFICANT CHANGE UP (ref 3.8–5.2)
RBC # FLD: 12.6 % — SIGNIFICANT CHANGE UP (ref 10.3–14.5)
RBC BLD AUTO: SIGNIFICANT CHANGE UP
RSV RNA NPH QL NAA+NON-PROBE: SIGNIFICANT CHANGE UP
SARS-COV-2 RNA SPEC QL NAA+PROBE: SIGNIFICANT CHANGE UP
SODIUM SERPL-SCNC: 132 MMOL/L — LOW (ref 135–145)
TROPONIN T, HIGH SENSITIVITY RESULT: 6 NG/L — SIGNIFICANT CHANGE UP (ref 0–51)
WBC # BLD: 3.48 K/UL — LOW (ref 3.8–10.5)
WBC # FLD AUTO: 3.48 K/UL — LOW (ref 3.8–10.5)

## 2022-02-02 PROCEDURE — 99285 EMERGENCY DEPT VISIT HI MDM: CPT

## 2022-02-02 PROCEDURE — 74174 CTA ABD&PLVS W/CONTRAST: CPT | Mod: 26,MA

## 2022-02-02 PROCEDURE — 71275 CT ANGIOGRAPHY CHEST: CPT | Mod: 26,MA

## 2022-02-02 NOTE — ED PROVIDER NOTE - CHIEF COMPLAINT
The patient is a 76y Female complaining of  The patient is a 76y Female complaining of bilateral leg pain.

## 2022-02-02 NOTE — ED PROVIDER NOTE - CLINICAL SUMMARY MEDICAL DECISION MAKING FREE TEXT BOX
76F w/ PMH of HTN (reportedly not on any medications), HLD presenting to the ED for bilateral lower extremity pain. No visible lesions or hx of trauma to the area; CTA and CT chest ordered earlier without PE nor AAA pathology; labs within normal limits, possible vascular component given worsening pain with ambulation; vascular consult for PAD, possibly ?neuropathy but denies hx of DM vs radiculopathy

## 2022-02-02 NOTE — ED PROVIDER NOTE - OBJECTIVE STATEMENT
76F w/ PMH of HTN (reportedly not on any medications), HLD presenting to the ED for bilateral lower extremity pain. Patient states she has been unable to ambulate 2/2 to pain for 1.5 weeks. She has pain when she stands that worsens after walking. No pain while patient is sitting or laying down. Had an episode of LUE pain while showering a few days ago. Denies fevers/chills, chest pain, difficulty breathing, abdominal pain, changes in urination or bowel movements. No history of DM. Denies back pain nor recent falls/trauma to the legs and no wounds/cuts on the leg. Took baby ASA a couple days ago for the pain.

## 2022-02-02 NOTE — CONSULT NOTE ADULT - SUBJECTIVE AND OBJECTIVE BOX
VASCULAR SURGERY CONSULT NOTE  --------------------------------------------------------------------------------------------    Patient is a 76y old  Female who presents with a chief complaint of bilateral lower extremity pain    HPI: 76F w/ PMH of HTN (reportedly not on any medications), HLD presenting to the ED for bilateral lower extremity pain. Patient states she has been unable to ambulate 2/2 to pain for 1.5 weeks. She has pain when she stands that worsens after walking. No pain while patient is sitting or laying down. Had an episode of LUE pain while showering a few days ago. Denies fevers/chills, chest pain, difficulty breathing, abdominal pain, changes in urination or bowel movements. No history of DM. Denies back pain nor recent falls/trauma to the legs and no wounds/cuts on the leg. Took baby ASA a couple days ago for the pain.    Vascular Surgery consulted to evaluate for claudication-like symptoms  Patient endorses pain in bilateral lower extremities for past 1 week, worse when going from sitting to standing and with walking, better with rest   Pain feels like a shooting pain to the back of her knees   Has never had this kind of pain till 1 week ago   Denies rest pain, trauma/falls, wounds   Never smoker  Not medically treating HTN or HLD  Ambulates independently in community   Independent in ADLs    ROS: 10-system review is otherwise negative except HPI above.      PAST MEDICAL & SURGICAL HISTORY:  HLD (hyperlipidemia)  No significant past surgical history    FAMILY HISTORY:  No pertinent family history in first degree relatives    ALLERGIES: No Known Allergies    CURRENT MEDICATIONS  MEDICATIONS (STANDING):   MEDICATIONS (PRN):  --------------------------------------------------------------------------------------------    Vitals:   T(C): 36.7 (02-02-22 @ 20:43), Max: 37.2 (02-02-22 @ 17:51)  HR: 76 (02-02-22 @ 20:43) (76 - 89)  BP: 161/74 (02-02-22 @ 20:43) (161/74 - 208/95)  RR: 16 (02-02-22 @ 20:43) (16 - 18)  SpO2: 97% (02-02-22 @ 20:43) (95% - 97%)  CAPILLARY BLOOD GLUCOSE    Height (cm): 156 (02-02 @ 17:51)  Weight (kg): 56.7 (02-02 @ 17:51)  BMI (kg/m2): 23.3 (02-02 @ 17:51)  BSA (m2): 1.55 (02-02 @ 17:51)    PHYSICAL EXAM:   General: NAD, Lying in bed comfortably  Neuro: A+Ox3  Cardio: RRR  Resp: Good effort  Vascular: All 4 extremities warm, B/l radial pulses palpable, b/l Femoral/pop/DP/PT pulse palpable, no palpable pulsatile abdominal mass  Musculoskeletal: All 4 extremities moving spontaneously, no limitations  --------------------------------------------------------------------------------------------    LABS  CBC (02-02 @ 18:37)                              12.4                           3.48<L>  )----------------(  293        0.9<L>% Neutrophils, 82.7<H>% Lymphocytes, ANC: 0.03<L>                              37.3      BMP (02-02 @ 18:37)             132<L>  |  96      |  8     		Ca++ --      Ca 9.5                ---------------------------------( 116<H>		Mg 2.2                3.8     |  24      |  0.87  			Ph 3.1       LFTs (02-02 @ 18:37)      TPro 8.3 / Alb 3.9 / TBili 0.4 / DBili -- / AST 27 / ALT 21 / AlkPhos 173<H>      Cardiac Markers (02-02 @ 18:37)     HSTrop: -- / CKMB: -- / CK: 40  --------------------------------------------------------------------------------------------  IMAGING  < from: CT Angio Chest Aorta w/wo IV Cont (02.02.22 @ 18:41) >  FINDINGS:  CHEST:  LUNGS AND LARGE AIRWAYS: Patent central airways. No pulmonary nodules.   There is mild linear atelectasis or scarring at the right lung base  PLEURA: No pleural effusion.  VESSELS: Atherosclerotic calcification of aorta. No evidence of   dissection or aneurysm. Pulmonary arteries enhance normally.  HEART: Heart size is normal. No pericardial effusion.  MEDIASTINUM AND DIONI: No lymphadenopathy.  CHEST WALL AND LOWER NECK: Within normal limits.    ABDOMEN AND PELVIS:  LIVER: Within normal limits.  BILE DUCTS: Normal caliber.  GALLBLADDER: Within normal limits.  SPLEEN: Within normal limits.  PANCREAS: Within normal limits.  ADRENALS: Within normal limits.  KIDNEYS/URETERS: Within normal limits.    BLADDER: Within normal limits.  REPRODUCTIVE ORGANS: Uterus and adnexa within normal limits.    BOWEL: No bowel obstruction. Appendix is normal.  PERITONEUM: No ascites.  VESSELS: Atherosclerotic changes. No evidence of dissection. Normal   enhancement of the aorta and its branches. There is mild narrowing in the   proximal superior mesenteric artery  RETROPERITONEUM/LYMPH NODES: No lymphadenopathy.  ABDOMINAL WALL: Within normal limits.  BONES: degenerative changes. 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 A bone   island is seen in the left 11th posterior rib    IMPRESSION:  No evidence of aortic dissection. No evidence of pulmonary embolism.    No acute pathology in the chest, abdomen or pelvis       VASCULAR SURGERY CONSULT NOTE  --------------------------------------------------------------------------------------------    Patient is a 76y old  Female who presents with a chief complaint of bilateral lower extremity pain    HPI: 76F w/ PMH of HTN (reportedly not on any medications), HLD presenting to the ED for bilateral lower extremity pain. Patient states she has been unable to ambulate 2/2 to pain for 1.5 weeks. She has pain when she stands that worsens after walking. No pain while patient is sitting or laying down. Had an episode of LUE pain while showering a few days ago. Denies fevers/chills, chest pain, difficulty breathing, abdominal pain, changes in urination or bowel movements. No history of DM. Denies back pain nor recent falls/trauma to the legs and no wounds/cuts on the leg. Took baby ASA a couple days ago for the pain.    Vascular Surgery consulted to evaluate for claudication-like symptoms  Patient endorses pain in bilateral lower extremities for past 1 week, worse when going from sitting to standing and with walking, better with rest   Pain feels like shooting pain to the back of her knees   Has never had this kind of pain till 1 week ago   Denies rest pain, trauma/falls, wounds, back pain   Never smoker  Not medically treating HTN or HLD  Ambulates independently in community   Independent in ADLs    ROS: 10-system review is otherwise negative except HPI above.      PAST MEDICAL & SURGICAL HISTORY:  HLD (hyperlipidemia)  No significant past surgical history    FAMILY HISTORY:  No pertinent family history in first degree relatives    ALLERGIES: No Known Allergies    CURRENT MEDICATIONS  MEDICATIONS (STANDING):   MEDICATIONS (PRN):  --------------------------------------------------------------------------------------------    Vitals:   T(C): 36.7 (02-02-22 @ 20:43), Max: 37.2 (02-02-22 @ 17:51)  HR: 76 (02-02-22 @ 20:43) (76 - 89)  BP: 161/74 (02-02-22 @ 20:43) (161/74 - 208/95)  RR: 16 (02-02-22 @ 20:43) (16 - 18)  SpO2: 97% (02-02-22 @ 20:43) (95% - 97%)  CAPILLARY BLOOD GLUCOSE    Height (cm): 156 (02-02 @ 17:51)  Weight (kg): 56.7 (02-02 @ 17:51)  BMI (kg/m2): 23.3 (02-02 @ 17:51)  BSA (m2): 1.55 (02-02 @ 17:51)    PHYSICAL EXAM:   General: NAD, Lying in bed comfortably  Neuro: A+Ox3  Cardio: RRR  Resp: Good effort  Vascular: All 4 extremities warm, B/l radial pulses palpable, b/l Femoral/pop/DP/PT pulse palpable, no palpable pulsatile abdominal mass  Musculoskeletal: All 4 extremities moving spontaneously, no limitations  --------------------------------------------------------------------------------------------    LABS  CBC (02-02 @ 18:37)                              12.4                           3.48<L>  )----------------(  293        0.9<L>% Neutrophils, 82.7<H>% Lymphocytes, ANC: 0.03<L>                              37.3      BMP (02-02 @ 18:37)             132<L>  |  96      |  8     		Ca++ --      Ca 9.5                ---------------------------------( 116<H>		Mg 2.2                3.8     |  24      |  0.87  			Ph 3.1       LFTs (02-02 @ 18:37)      TPro 8.3 / Alb 3.9 / TBili 0.4 / DBili -- / AST 27 / ALT 21 / AlkPhos 173<H>      Cardiac Markers (02-02 @ 18:37)     HSTrop: -- / CKMB: -- / CK: 40  --------------------------------------------------------------------------------------------  IMAGING  < from: CT Angio Chest Aorta w/wo IV Cont (02.02.22 @ 18:41) >  FINDINGS:  CHEST:  LUNGS AND LARGE AIRWAYS: Patent central airways. No pulmonary nodules.   There is mild linear atelectasis or scarring at the right lung base  PLEURA: No pleural effusion.  VESSELS: Atherosclerotic calcification of aorta. No evidence of   dissection or aneurysm. Pulmonary arteries enhance normally.  HEART: Heart size is normal. No pericardial effusion.  MEDIASTINUM AND DIONI: No lymphadenopathy.  CHEST WALL AND LOWER NECK: Within normal limits.    ABDOMEN AND PELVIS:  LIVER: Within normal limits.  BILE DUCTS: Normal caliber.  GALLBLADDER: Within normal limits.  SPLEEN: Within normal limits.  PANCREAS: Within normal limits.  ADRENALS: Within normal limits.  KIDNEYS/URETERS: Within normal limits.    BLADDER: Within normal limits.  REPRODUCTIVE ORGANS: Uterus and adnexa within normal limits.    BOWEL: No bowel obstruction. Appendix is normal.  PERITONEUM: No ascites.  VESSELS: Atherosclerotic changes. No evidence of dissection. Normal   enhancement of the aorta and its branches. There is mild narrowing in the   proximal superior mesenteric artery  RETROPERITONEUM/LYMPH NODES: No lymphadenopathy.  ABDOMINAL WALL: Within normal limits.  BONES: degenerative changes. 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 A bone   island is seen in the left 11th posterior rib    IMPRESSION:  No evidence of aortic dissection. No evidence of pulmonary embolism.    No acute pathology in the chest, abdomen or pelvis

## 2022-02-02 NOTE — ED ADULT NURSE NOTE - NS ED NURSE REPORT GIVEN TO FT
Endoscopy Procedure Note    Date of procedure:   05/09/2018    Indications: abdominal pain nausea vomiting    Pre-operative Diagnosis: abdominal pain nausea vomiting    Post-operative Diagnosis: Same    Surgeon: hCai Reese MD    Assistants: none    Anesthesia: General endotracheal anesthesia    ASA Class: 3    Procedure Details   The patient was seen in the Holding Room. The risks, benefits, complications, treatment options, and expected outcomes were discussed with the patient. The possibilities of reaction to medication, pulmonary aspiration, perforation of viscus, bleeding, recurrent infection, the need for additional procedures, failure to diagnose a condition, and creating a complication requiring transfusion or operation were discussed with the patient. The patient concurred with the proposed plan, giving informed consent. The site of surgery properly noted/marked. The patient was taken to Operating Room 7 identified as Yodit Canseco and the procedure verified as endoscopy stent removal. A Time Out was held and the above information confirmed.    Full general anesthesia was induced with orotracheal intubation. The patient was prepped and draped in a supine position. Appropriate antibiotics were given intravenously. Arms were out.    Endoscopy was used to remove previous stent with rat tooth graspers under fluoroscopy.  Esophagus was normal hemoclip had already fallen off esophagus.  NJ tube was removed was stent removed.      NJ tube then replaced with fluoro and was confirmed to be in the distal small bowel with direct visualization.  IT was taped in place.    Instrument, sponge, and needle counts were correct prior to wound closure and at the conclusion of the case.     Findings:  Healing sleeve    Estimated Blood Loss: 1.0 cc    Drains: none    Total IV Fluids: 1000 ml    Specimens: none    Implants: none    Complications:  None; patient tolerated the procedure well.    Disposition: PACU -  hemodynamically stable.    Condition: stable    Attending Attestation: I was present and scrubbed for the entire procedure.       zhane barrow holding

## 2022-02-02 NOTE — ED PROVIDER NOTE - NS ED ROS FT
REVIEW OF SYSTEMS:    CONSTITUTIONAL: No fevers, chills, fatigue, weakness  HEENT: No loss of vision, blurry vision. No sore throat  CARDIOVASCULAR: No chest pain. No palpitations  RESPIRATORY: No shortness of breath, cough  GASTROINTESTINAL: No nausea, vomiting, diarrhea, constipation. No abdominal pain  GENITOURINARY:  No hematuria, polyuria, dysuria  SKIN: No rashes, itching  MUSCULOSKELETAL: +b/l lower extremity pain, mainly In lower legs (below knee, above feet), worsens with movement; denies weakness in lower extremities  PSYCHIATRIC: No history of depression or anxiety  NEUROLOGICAL: No headaches, dizziness. No change in bowel or bladder control  ENDOCRINOLOGIC: No reports of cold or heat intolerance. No polydipsia  ALLERGIES: No history of asthma, hives, or eczema

## 2022-02-02 NOTE — ED ADULT TRIAGE NOTE - CHIEF COMPLAINT QUOTE
Sent in to r/o DVT  B/L leg pain  and unable to walk without pain  Pt has high blood pressure no mediation   pt does not want to tale medicaine

## 2022-02-02 NOTE — ED PROVIDER NOTE - PROGRESS NOTE DETAILS
Binh Hernandez, PGY2: Patient seen and examined in ED; b/l lower extremity weakness, no visible lesions or hx of trauma to the area; CTA and CT chest ordered earlier without PE nor AAA pathology; labs within normal limits, possible vascular component given worsening pain with ambulation iBnh Hernandez, PGY2: ortho recs appreciated; MRI can be done outpatient, management of pain with NSAIDs and Tylenol Binh Hernandez, PGY2: Vascular consult appreciated; possibly neurogenic claudication for patient's pain, cont pain management and possible repeat imaging; ortho consult Binh Hernandez, PGY2: ortho recs appreciated; MRI can be done outpatient, management of pain with NSAIDs and Tylenol. Pt still unable to ambulate well after receiving pain medications 2/2 to pain; takes 3-4 steps before needing to sit down.

## 2022-02-02 NOTE — ED ADULT NURSE REASSESSMENT NOTE - NS ED NURSE REASSESS COMMENT FT1
Handoff report received from HERBER Cronin. Pt resting comfortably in [address] 3. Pt A&O x 4, VSS. Pt denies any pain or needs at this time. Pt pending US. Bed in lowest position, side rails up and call bell in reach.

## 2022-02-02 NOTE — ED PROVIDER NOTE - RAPID ASSESSMENT
76 F with PMHx of HTN - non compliant presents with bilateral lower extremity pain. Reports unable stand or ambulate secondary to pain x1.5 week. Denies chest pain, however, had an episode few days ago of LUE pain while showering. Denies hx of DM, back pain, or recent falls. Endorses dizziness when waking up each morning. Endorsed baby ASA couple of days ago.     Scribe Statement: Janine DAMON Tiffany, attest that this documentation has been prepared under the direction and in the presence of Pierre Reid) 76 F with PMHx of HTN - non compliant presents with bilateral lower extremity pain. Reports unable stand or ambulate secondary to pain x1.5 week. Denies chest pain, however, had an episode few days ago of LUE pain while showering. Denies hx of DM, back pain, or recent falls. Endorses dizziness when waking up each morning. Endorsed baby ASA couple of days ago.     Scribe Statement: I, Michelle Mehta, attest that this documentation has been prepared under the direction and in the presence of Pierre Reid)  I, Dr. Reid, personally performed the service described in the documentation recorded by the scribe in my presence, and it accurately and completely records my words and actions.

## 2022-02-02 NOTE — ED PROVIDER NOTE - ATTENDING CONTRIBUTION TO CARE
Patient seen 2 and [place] [id number] room: patient presents with chief complaint of lower extremity pain when standing or walking, moderate and severe when moving on the lower extremities. Patient states pain is below the knees and in the calves bilaterally. no f/c no travel, no lower extremity swelling  ncat  trachea midline  mmm  non-tachycardic  non-tachypneic  soft, ntnd  no gross deformity of extremities, no asymmetry, no erythema or swelling to either lower extremity   no warmth or redness to either lower extremity   no pain to palpation of either lower extremity   dp pulses intact bilaterally  Jonathon Hernandez MD, FACEP: In this physician's medical judgement based on clinical history and physical exam: patient with bilateral lower extremity pain and concern for claudication, will get iv, cbc, cmp, analgesia prn.   Will follow up on labs, analgesia, imaging, reassess and disposition as clinically indicated.  *The above represents an initial assessment/impression. Please refer to my progress notes below for potential changes in patient clinical course*   vascular surgery consulted and recommendations appreciated  spine consulted for ct findings and possible neurogenic claudication  will give analgesia and reassess, patient likely to be and admission secondary to pain and limitaiton of activities of daily living Patient seen 2 and [place] [id number] room: patient presents with chief complaint of lower extremity pain when standing or walking, moderate and severe when moving on the lower extremities. Patient states pain is below the knees and in the calves bilaterally. no f/c no travel, no lower extremity swelling  ncat  trachea midline  mmm  non-tachycardic  non-tachypneic  soft, ntnd  no gross deformity of extremities, no asymmetry, no erythema or swelling to either lower extremity   no warmth or redness to either lower extremity   no pain to palpation of either lower extremity   dp pulses intact bilaterally  Jonathon Hernandez MD, FACEP: In this physician's medical judgement based on clinical history and physical exam: patient with bilateral lower extremity pain and concern for claudication, will get iv, cbc, cmp, analgesia prn.   Will follow up on labs, analgesia, imaging, reassess and disposition as clinically indicated.  *The above represents an initial assessment/impression. Please refer to my progress notes below for potential changes in patient clinical course*   vascular surgery consulted and recommendations appreciated  spine consulted for ct findings and possible neurogenic claudication  will give analgesia and reassess, patient likely to be and admission secondary to pain and limitation of activities of daily living  Patient endorsed to Dr. Mcconnell at the time of admission. Based on patient's history and physical exam, as well as the results of today's workup, I feel that patient warrants admission to the hospital for further workup/evaluation and continued management. I discussed the findings of today's workup with the patient and addressed the patient's questions and concerns. The patient was agreeable with admission. Our team spoke with the inpatient receiving team who accepted the patient for admission and subsequently took over the patient's care at the time of admission. The receiving team will follow up on pending labs, analgesia, any clinical imaging results, ancillary findings, reassess, and disposition as clinically indicated. Details of patient and plan conveyed to receiving physician team and conveyed back for understanding. There were no questions at this time about the patient's status, disposition, and plan. Patient's care to be taken over by receiving physician team at this time, all decisions regarding the progression of care will be made at their discretion.

## 2022-02-02 NOTE — ED ADULT NURSE NOTE - OBJECTIVE STATEMENT
Pt was send to ct to rule out dissection. Georgian speaking pt presents to ED c/o of MICHELLE leg pain. pt states she is unable to bear wait on her legs all her leg muscles hurts. AAOx4, spontaneous respiration at RA.

## 2022-02-02 NOTE — CONSULT NOTE ADULT - ASSESSMENT
76F with PMHx HTN, HLD presenting with 1 week of bilateral lower extremity pain consistent with neurogenic claudication. Vascular Surgery consulted to evaluate.     PLAN:   - Claudication symptoms not of vascular etiology  - No indication for acute surgical interventions or additional vascular imaging   - Patient likely needs imaging of spine with XR, MRI   - Continue HTN and HLD control   - Dispo per ED    Patient seen/examined with Vascular Surgery fellow, Dr. Álvarez  Plan to be discussed with Attending, Dr. Pham Herrera   Vascular Surgery  [id number]

## 2022-02-02 NOTE — ED PROVIDER NOTE - CARE PLAN
Principal Discharge DX:	Neurogenic claudication  Secondary Diagnosis:	Bilateral leg pain   1 Principal Discharge DX:	Pain in both lower legs  Secondary Diagnosis:	HTN (hypertension)

## 2022-02-02 NOTE — ED PROVIDER NOTE - PHYSICAL EXAMINATION
VITALS:   T(C): 36.7 (02-02-22 @ 20:43), Max: 37.2 (02-02-22 @ 17:51)  HR: 76 (02-02-22 @ 20:43) (76 - 89)  BP: 161/74 (02-02-22 @ 20:43) (161/74 - 208/95)  RR: 16 (02-02-22 @ 20:43) (16 - 18)  SpO2: 97% (02-02-22 @ 20:43) (95% - 97%)    GENERAL: No acute distress  EYES: EOMI, PERRLA; conjunctiva and sclera clear  ENMT: Moist oral mucosa  NECK: Supple, no palpable masses  RESPIRATORY: Lungs clear to ascultation b/l; unlabored respirations  CARDIOVASCULAR: Regular rate and rhythm, normal S1 and S2, no murmurs/rubs/gallops  ABDOMEN: Soft, normal bowel sounds, nondistended, nontender  MUSCULOSKELETAL: No joint swelling or tenderness to palpation; no warmth or erythema nor venous stasis dermatitis; tried to ambulate patient but pt describes pain, able to walk 3-4 steps before having to sit down; dorsalis pedis pulse and posterior tibial pulse palpated  PSYCH: Affect appropriate  NEUROLOGY: AAOx3, CNs grossly intact  SKIN: No rashes; no palpable lesions VITALS:   T(C): 36.7 (02-02-22 @ 20:43), Max: 37.2 (02-02-22 @ 17:51)  HR: 76 (02-02-22 @ 20:43) (76 - 89)  BP: 161/74 (02-02-22 @ 20:43) (161/74 - 208/95)  RR: 16 (02-02-22 @ 20:43) (16 - 18)  SpO2: 97% (02-02-22 @ 20:43) (95% - 97%)    GENERAL: No acute distress  EYES: EOMI, PERRLA; conjunctiva and sclera clear  ENMT: Moist oral mucosa  NECK: Supple, no palpable masses  RESPIRATORY: Lungs clear to ascultation b/l; unlabored respirations  CARDIOVASCULAR: Regular rate and rhythm, normal S1 and S2, no murmurs/rubs/gallops  ABDOMEN: Soft, normal bowel sounds, nondistended, nontender  MUSCULOSKELETAL: No joint swelling or tenderness to palpation; no warmth or erythema nor venous stasis dermatitis; tried to ambulate patient but pt describes pain, able to walk 3-4 steps before having to sit down; dorsalis pedis pulse and posterior tibial pulse palpated  PSYCH: Affect appropriate  NEUROLOGY: AAOx3, CNs grossly intact; straight leg raise test negative for pain on b/l LEs  SKIN: No rashes; no palpable lesions

## 2022-02-03 VITALS
OXYGEN SATURATION: 100 % | SYSTOLIC BLOOD PRESSURE: 162 MMHG | DIASTOLIC BLOOD PRESSURE: 75 MMHG | TEMPERATURE: 97 F | RESPIRATION RATE: 18 BRPM | HEART RATE: 69 BPM

## 2022-02-03 DIAGNOSIS — M79.669 PAIN IN UNSPECIFIED LOWER LEG: ICD-10-CM

## 2022-02-03 DIAGNOSIS — G95.19 OTHER VASCULAR MYELOPATHIES: ICD-10-CM

## 2022-02-03 DIAGNOSIS — R09.89 OTHER SPECIFIED SYMPTOMS AND SIGNS INVOLVING THE CIRCULATORY AND RESPIRATORY SYSTEMS: ICD-10-CM

## 2022-02-03 DIAGNOSIS — I10 ESSENTIAL (PRIMARY) HYPERTENSION: ICD-10-CM

## 2022-02-03 PROBLEM — E78.5 HYPERLIPIDEMIA, UNSPECIFIED: Chronic | Status: ACTIVE | Noted: 2017-07-05

## 2022-02-03 PROCEDURE — 99223 1ST HOSP IP/OBS HIGH 75: CPT

## 2022-02-03 PROCEDURE — 99285 EMERGENCY DEPT VISIT HI MDM: CPT | Mod: 25

## 2022-02-03 PROCEDURE — 85025 COMPLETE CBC W/AUTO DIFF WBC: CPT

## 2022-02-03 PROCEDURE — 83735 ASSAY OF MAGNESIUM: CPT

## 2022-02-03 PROCEDURE — 80053 COMPREHEN METABOLIC PANEL: CPT

## 2022-02-03 PROCEDURE — 85379 FIBRIN DEGRADATION QUANT: CPT

## 2022-02-03 PROCEDURE — 93970 EXTREMITY STUDY: CPT | Mod: 26

## 2022-02-03 PROCEDURE — 72100 X-RAY EXAM L-S SPINE 2/3 VWS: CPT | Mod: 26

## 2022-02-03 PROCEDURE — 93923 UPR/LXTR ART STDY 3+ LVLS: CPT

## 2022-02-03 PROCEDURE — 72100 X-RAY EXAM L-S SPINE 2/3 VWS: CPT

## 2022-02-03 PROCEDURE — 84484 ASSAY OF TROPONIN QUANT: CPT

## 2022-02-03 PROCEDURE — 93970 EXTREMITY STUDY: CPT

## 2022-02-03 PROCEDURE — 97161 PT EVAL LOW COMPLEX 20 MIN: CPT

## 2022-02-03 PROCEDURE — 96374 THER/PROPH/DIAG INJ IV PUSH: CPT | Mod: XU

## 2022-02-03 PROCEDURE — 84100 ASSAY OF PHOSPHORUS: CPT

## 2022-02-03 PROCEDURE — 74174 CTA ABD&PLVS W/CONTRAST: CPT | Mod: MA

## 2022-02-03 PROCEDURE — 71275 CT ANGIOGRAPHY CHEST: CPT | Mod: MA

## 2022-02-03 PROCEDURE — 87637 SARSCOV2&INF A&B&RSV AMP PRB: CPT

## 2022-02-03 PROCEDURE — 82550 ASSAY OF CK (CPK): CPT

## 2022-02-03 RX ORDER — KETOROLAC TROMETHAMINE 30 MG/ML
15 SYRINGE (ML) INJECTION ONCE
Refills: 0 | Status: DISCONTINUED | OUTPATIENT
Start: 2022-02-03 | End: 2022-02-03

## 2022-02-03 RX ORDER — ACETAMINOPHEN 500 MG
650 TABLET ORAL ONCE
Refills: 0 | Status: COMPLETED | OUTPATIENT
Start: 2022-02-03 | End: 2022-02-03

## 2022-02-03 RX ORDER — ACETAMINOPHEN 500 MG
650 TABLET ORAL EVERY 6 HOURS
Refills: 0 | Status: DISCONTINUED | OUTPATIENT
Start: 2022-02-03 | End: 2022-02-03

## 2022-02-03 RX ORDER — KETOROLAC TROMETHAMINE 30 MG/ML
10 SYRINGE (ML) INJECTION THREE TIMES A DAY
Refills: 0 | Status: DISCONTINUED | OUTPATIENT
Start: 2022-02-03 | End: 2022-02-03

## 2022-02-03 RX ORDER — ACETAMINOPHEN 500 MG
2 TABLET ORAL
Qty: 0 | Refills: 0 | DISCHARGE
Start: 2022-02-03

## 2022-02-03 RX ORDER — LANOLIN ALCOHOL/MO/W.PET/CERES
3 CREAM (GRAM) TOPICAL AT BEDTIME
Refills: 0 | Status: DISCONTINUED | OUTPATIENT
Start: 2022-02-03 | End: 2022-02-03

## 2022-02-03 RX ORDER — ONDANSETRON 8 MG/1
4 TABLET, FILM COATED ORAL EVERY 8 HOURS
Refills: 0 | Status: DISCONTINUED | OUTPATIENT
Start: 2022-02-03 | End: 2022-02-03

## 2022-02-03 RX ORDER — CYCLOBENZAPRINE HYDROCHLORIDE 10 MG/1
5 TABLET, FILM COATED ORAL THREE TIMES A DAY
Refills: 0 | Status: DISCONTINUED | OUTPATIENT
Start: 2022-02-03 | End: 2022-02-03

## 2022-02-03 RX ORDER — ENOXAPARIN SODIUM 100 MG/ML
40 INJECTION SUBCUTANEOUS DAILY
Refills: 0 | Status: DISCONTINUED | OUTPATIENT
Start: 2022-02-03 | End: 2022-02-03

## 2022-02-03 RX ORDER — KETOROLAC TROMETHAMINE 30 MG/ML
1 SYRINGE (ML) INJECTION
Qty: 9 | Refills: 0
Start: 2022-02-03 | End: 2022-02-05

## 2022-02-03 RX ADMIN — Medication 650 MILLIGRAM(S): at 00:00

## 2022-02-03 RX ADMIN — Medication 15 MILLIGRAM(S): at 00:00

## 2022-02-03 RX ADMIN — ENOXAPARIN SODIUM 40 MILLIGRAM(S): 100 INJECTION SUBCUTANEOUS at 12:02

## 2022-02-03 RX ADMIN — Medication 15 MILLIGRAM(S): at 00:34

## 2022-02-03 RX ADMIN — Medication 650 MILLIGRAM(S): at 00:35

## 2022-02-03 RX ADMIN — Medication 650 MILLIGRAM(S): at 16:40

## 2022-02-03 NOTE — DISCHARGE NOTE NURSING/CASE MANAGEMENT/SOCIAL WORK - PATIENT PORTAL LINK FT
You can access the FollowMyHealth Patient Portal offered by [facility] by registering at the following website: [email or web address]. By joining [employer]’s FollowMyHealth portal, you will also be able to view your health information using other applications (apps) compatible with our system.

## 2022-02-03 NOTE — CONSULT NOTE ADULT - ATTENDING COMMENTS
Agree with above. Neurologically intact. Has signs and symptoms of lumbar radiculopathy/neurogenic claudication. Would recommend MRI L spine either inpatient or outpatient. O/S to continue to follow while in house.
Patient with sudden onset bilateral lower extremity pain 1 week ago.  Pain worsened with standing up and ambulation.  on exam, palpable femora, popliteal, pedal pulses bilaterally.    duplex negative for DVT BLE    - patient's history, symptoms, and physical exam is not consistent with arterial insufficiency. no additional vascular surgery workup is needed.  - recommend outpatient follow up with orthopedic surgery and further imaging as prescribed by orthopedic surgery.

## 2022-02-03 NOTE — H&P ADULT - NSHPSOCIALHISTORY_GEN_ALL_CORE
nonsmoker, nondrinker, no drugs, lives at home with son, had been functionally independent until onset of symptoms.

## 2022-02-03 NOTE — CONSULT NOTE ADULT - TIME BILLING
Please note that over 50 minutes of time was spent in care of this patient including previsit preparation, in person visit, post visit documentation, discussion with colleagues, review of imaging.

## 2022-02-03 NOTE — DISCHARGE NOTE PROVIDER - NSDCMRMEDTOKEN_GEN_ALL_CORE_FT
acetaminophen 325 mg oral tablet: 2 tab(s) orally every 6 hours, Mild Pain (1 - 3)  ketorolac 10 mg oral tablet: 1 tab(s) orally 3 times a day, As needed, Severe Pain (7 - 10)  MRI Lumbar spine w/o contrast : Rule out lumbar radiculopathy. Please forward results to Dr. Binh Coronado   [id number] [place] [address]  [phone]  Outpatient Physical therapy :   Outpatient Physical therapy :   Rolling Walker for ambulation :

## 2022-02-03 NOTE — PHYSICAL THERAPY INITIAL EVALUATION ADULT - ADDITIONAL COMMENTS
Pt lives with son in 1st floor apartment, no stairs to negotiate. Prior to onset of pain was able to walk long distances. Does not use assistive device

## 2022-02-03 NOTE — H&P ADULT - ASSESSMENT
76f hx of HTN not current on medication presenting with 4 days of severe B/L LE pain limiting her function concerning for neurogenic claudication vs. less likely vascular claudication vs. must r/o DVT

## 2022-02-03 NOTE — DISCHARGE NOTE PROVIDER - HOSPITAL COURSE
76f hx of HTN not current on medication presenting with 4 days of severe B/L LE pain limiting her function concerning for neurogenic claudication vs. less likely vascular claudication vs. must r/o DVT 76f hx of HTN not current on medication presenting with 4 days of severe B/L LE pain limiting her function concerning for neurogenic claudication vs. less likely vascular claudication vs. must r/o DVT. Admitted for concern for neurogenic claudication vs. DVT vs. less likely vascular claudication. No preceding trauma/injury. Ortho and vascular consults obtained, no inpt workup needed, per PT eval patient for outpt PT. Patient cleared for D/C home with plan for MRI LS spine. Toradol for pain management x 3 days and to continue tylenol prn mild-mod pain. Patient to see her PCP tomorrow, instructed to take all discharges instructions and imaging with her.

## 2022-02-03 NOTE — H&P ADULT - NSHPLABSRESULTS_GEN_ALL_CORE
.  LABS:                         12.4   3.48  )-----------( 293      ( 02 Feb 2022 18:37 )             37.3     02-02    132<L>  |  96  |  8   ----------------------------<  116<H>  3.8   |  24  |  0.87    Ca    9.5      02 Feb 2022 18:37  Phos  3.1     02-02  Mg     2.2     02-02    TPro  8.3  /  Alb  3.9  /  TBili  0.4  /  DBili  x   /  AST  27  /  ALT  21  /  AlkPhos  173<H>  02-02              RADIOLOGY, EKG & ADDITIONAL TESTS: Reviewed.   EKG: ordered  CT chest personally reviewed: no pulmonary infiltrates or PE

## 2022-02-03 NOTE — DISCHARGE NOTE PROVIDER - NSDCCPCAREPLAN_GEN_ALL_CORE_FT
PRINCIPAL DISCHARGE DIAGNOSIS  Diagnosis: Neurogenic claudication  Assessment and Plan of Treatment: Outpatient physical therapy was ordered and a prescription was given to you. Please also follow up with MRI of the Lumabr spine and with your PMD for further workup if pain continues.      SECONDARY DISCHARGE DIAGNOSES  Diagnosis: HTN (hypertension)  Assessment and Plan of Treatment: Low salt diet  Activity as tolerated.  Follow up with your medical doctor for routine blood pressure monitoring at your next visit.  Notify your doctor if you have any of the following symptoms:   Dizziness, Lightheadedness, Blurry vision, Headache, Chest pain, Shortness of breath     PRINCIPAL DISCHARGE DIAGNOSIS  Diagnosis: Neurogenic claudication  Assessment and Plan of Treatment: Outpatient physical therapy was ordered and a prescription was given to you. Please also follow up with MRI of the Lumbar spine and with your PMD for further workup if pain continues. Continue Toradol as needed for 3 more days.      SECONDARY DISCHARGE DIAGNOSES  Diagnosis: HTN (hypertension)  Assessment and Plan of Treatment: Low salt diet  Activity as tolerated.  Follow up with your medical doctor for routine blood pressure monitoring at your next visit.  Notify your doctor if you have any of the following symptoms:   Dizziness, Lightheadedness, Blurry vision, Headache, Chest pain, Shortness of breath

## 2022-02-03 NOTE — DISCHARGE NOTE PROVIDER - CARE PROVIDER_API CALL
Today the [facility] data was reviewed by myself and there is no evidence of aberrant behavior.       [facility],   [address] [place], [place] [address]  Phone: [phone]  Fax: (   )    -  Follow Up Time:    [facility]  Internal Medicine  [address] [place] [address]  Phone: [phone]  Fax: [phone]  Follow Up Time:

## 2022-02-03 NOTE — DISCHARGE NOTE NURSING/CASE MANAGEMENT/SOCIAL WORK - NSDCPEFALRISK_GEN_ALL_CORE
For information on Fall & Injury Prevention, visit: [email or web address] OR  [email or web address] OR  https://www.cdc.gov/steadi/patient.html

## 2022-02-03 NOTE — PHYSICAL THERAPY INITIAL EVALUATION ADULT - PRECAUTIONS/LIMITATIONS, REHAB EVAL
symptoms consistent with lumbar radiculopathy/neurogenic claudication. Would recommend MRI L spine either inpatient or outpatient. symptoms consistent with lumbar radiculopathy/neurogenic claudication. Would recommend MRI L spine either inpatient or outpatient./no known precautions/limitations

## 2022-02-03 NOTE — PHYSICAL THERAPY INITIAL EVALUATION ADULT - PERTINENT HX OF CURRENT PROBLEM, REHAB EVAL
76 y/oF admitted 2/2 p/w MICHELLE LE pain, unable to stand or ambulate 2/2 pain x1.5 wks. She has pain when she stands that worsens after walking. No pain while patient is sitting or laying down. Had an episode of LUE pain while showering a few days pta. Denies back pain nor recent falls/trauma to the legs and no wounds/cuts on the leg. As per vascular consult, pt's symptoms consistent with neurogenic claudication, not of vascular etiology. As per ortho consult,

## 2022-02-03 NOTE — CONSULT NOTE ADULT - SUBJECTIVE AND OBJECTIVE BOX
76F community ambulator w/ PMH of HTN (reportedly not on any medications), HLD presenting to the ED for bilateral lower extremity pain. Patient states she has been having BL LE pain and calf cramping w ambulation for the past 4 weeks. She has pain when she stands that worsens after walking. No pain while patient is sitting or laying down. Denies fevers/chills, chest pain, difficulty breathing, abdominal pain, changes in urination or bowel movements. Denies back pain nor recent falls/trauma to the legs and no wounds/cuts on the leg.        HEALTH ISSUES - PROBLEM Dx:          MEDICATIONS  (STANDING):      Allergies    No Known Allergies    Intolerances        PAST MEDICAL & SURGICAL HISTORY:  No pertinent past medical history    HLD (hyperlipidemia)    No significant past surgical history                              12.4   3.48  )-----------( 293      ( 02 Feb 2022 18:37 )             37.3       02 Feb 2022 18:37    132    |  96     |  8      ----------------------------<  116    3.8     |  24     |  0.87     Ca    9.5        02 Feb 2022 18:37  Phos  3.1       02 Feb 2022 18:37  Mg     2.2       02 Feb 2022 18:37    TPro  8.3    /  Alb  3.9    /  TBili  0.4    /  DBili  x      /  AST  27     /  ALT  21     /  AlkPhos  173    02 Feb 2022 18:37              Vital Signs Last 24 Hrs  T(C): 36.7 (02-02-22 @ 20:43), Max: 37.2 (02-02-22 @ 17:51)  T(F): 98 (02-02-22 @ 20:43), Max: 98.9 (02-02-22 @ 17:51)  HR: 76 (02-02-22 @ 20:43) (76 - 89)  BP: 161/74 (02-02-22 @ 20:43) (161/74 - 208/95)  BP(mean): --  RR: 16 (02-02-22 @ 20:43) (16 - 18)  SpO2: 97% (02-02-22 @ 20:43) (95% - 97%)    Gen: NAD    Spine PE:  Skin intact  No gross deformity  No midline TTP C/T/L/S spine  No bony step offs  No paraspinal muscle ttp/hypertonicity   Negative clonus  Negative babinski  Negative bah  No saddle anesthesia             Deltoid        Bicep        Tricep          Wrist Ext    Wrist Flex    Finger Flex          Finger Abd  R            5/5          5/5           5/5              5/5                5/5	           5/5                         5/5  L             5/5           5/5          5/5              5/5                5/5                   5/5                        5/5                Hip Flex       Quad     Ankle DF        Ankle PF       Toe Ext        Hamstring    R            5/5              5/5          5/5                 5/5                 5/5	                5/5  L            5/5              5/5           5/5                 5/5                 5/5                5/5    Sensory:            C5         C6         C7      C8       T1        (0=absent, 1=impaired, 2=normal, NT=not testable)  R         2            2           2        2         2  L          2            2           2        2         2               L2          L3         L4      L5       S1         (0=absent, 1=impaired, 2=normal, NT=not testable)  R         2            2            2        2        2  L          2            2           2        2         2      Imaging: 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    A/P: 76y Female with BLLE pain    vasc recs appreciated  pain is possibly due to neurogenic claudication from spinal stenosis  pt w/o neuro deficits or red flags may get an MRI inpt if admitted or may be DC to have outpt MR  Pain control rec NSAIDs  WBAT with assistive devices as needed  PT  flexeril  BLLE US r/o DVT  ortho stable no acute intervention planned  discussed w Dr Barriga

## 2022-02-03 NOTE — H&P ADULT - PROBLEM SELECTOR PLAN 2
Pt with hypertensive crisis on presentation, likely stress response to pain. Now at baseline. Reports a prior hx of htn but states she no longer needs medication so hasn't been taking any.  - continue monitoring for now  - recommend close PCP follow up with medicine clinic on discharge    discussed with floor NP and son Klaus

## 2022-02-03 NOTE — H&P ADULT - HISTORY OF PRESENT ILLNESS
76f hx of HTN reports she no longer needs meds, no other reported medical history presenting with B/L LE pain x 4 days. Pain is worse with activity/walking and relieved with rest/laying/sitting. Pain is rated 10/10 at its worst and improved to 4/10 with toradol given to her in the ED. No other alleviating/exacerbating factors noted. Pain is crampy/squeezing primarily in her calves with some radiation up to thighs. No recent trauma or injury, although pt states for the past 3 weeks shes been relatively sedentary due to the weather so attributes the pain to less activitiy. Currently she is unable to walk or care for herself due to her pain, lives at home with her son. No recent travle/sick contacts. No fevers/chills, no numbness/tingling, no saddle anesthesia. No significant back pain.     In ED: 98.9, 89, 208/95, 18, 98RA. Given tylenol and toradol IV

## 2022-02-03 NOTE — DISCHARGE NOTE PROVIDER - PROVIDER TOKENS
FREE:[LAST:[[facility]],PHONE:[phone]],FAX:[(   )    -],ADDRESS:[[address] [place] [address]]] PROVIDER:[TOKEN:[[id number]]]

## 2022-02-03 NOTE — DISCHARGE NOTE PROVIDER - NSDCFUADDAPPT_GEN_ALL_CORE_FT
Follow up for Outpatient Physical therapy  Follow up for Outpatient Physical therapy and outpt MRI of the Lumbar spine.

## 2022-07-09 ENCOUNTER — INPATIENT (INPATIENT)
Facility: HOSPITAL | Age: 77
LOS: 1 days | Discharge: HOME | DRG: 603 | End: 2022-07-11
Attending: HOSPITALIST | Admitting: STUDENT IN AN ORGANIZED HEALTH CARE EDUCATION/TRAINING PROGRAM
Payer: MEDICARE

## 2022-07-09 VITALS
OXYGEN SATURATION: 98 % | RESPIRATION RATE: 18 BRPM | DIASTOLIC BLOOD PRESSURE: 85 MMHG | TEMPERATURE: 99 F | WEIGHT: 130.07 LBS | HEIGHT: 66 IN | HEART RATE: 93 BPM | SYSTOLIC BLOOD PRESSURE: 183 MMHG

## 2022-07-09 LAB
ALBUMIN SERPL ELPH-MCNC: 3.8 G/DL — SIGNIFICANT CHANGE UP (ref 3.3–5)
ALP SERPL-CCNC: 204 U/L — HIGH (ref 40–120)
ALT FLD-CCNC: 17 U/L — SIGNIFICANT CHANGE UP (ref 10–45)
ANION GAP SERPL CALC-SCNC: 11 MMOL/L — SIGNIFICANT CHANGE UP (ref 5–17)
AST SERPL-CCNC: 30 U/L — SIGNIFICANT CHANGE UP (ref 10–40)
BASOPHILS # BLD AUTO: 0 K/UL — SIGNIFICANT CHANGE UP (ref 0–0.2)
BASOPHILS NFR BLD AUTO: 0 % — SIGNIFICANT CHANGE UP (ref 0–2)
BILIRUB SERPL-MCNC: 0.6 MG/DL — SIGNIFICANT CHANGE UP (ref 0.2–1.2)
BUN SERPL-MCNC: 14 MG/DL — SIGNIFICANT CHANGE UP (ref 7–23)
CALCIUM SERPL-MCNC: 9.8 MG/DL — SIGNIFICANT CHANGE UP (ref 8.4–10.5)
CHLORIDE SERPL-SCNC: 100 MMOL/L — SIGNIFICANT CHANGE UP (ref 96–108)
CO2 SERPL-SCNC: 23 MMOL/L — SIGNIFICANT CHANGE UP (ref 22–31)
CREAT SERPL-MCNC: 1 MG/DL — SIGNIFICANT CHANGE UP (ref 0.5–1.3)
EGFR: 58 ML/MIN/1.73M2 — LOW
EOSINOPHIL # BLD AUTO: 0 K/UL — SIGNIFICANT CHANGE UP (ref 0–0.5)
EOSINOPHIL NFR BLD AUTO: 0 % — SIGNIFICANT CHANGE UP (ref 0–6)
GLUCOSE SERPL-MCNC: 108 MG/DL — HIGH (ref 70–99)
HCT VFR BLD CALC: 36.2 % — SIGNIFICANT CHANGE UP (ref 34.5–45)
HGB BLD-MCNC: 11.7 G/DL — SIGNIFICANT CHANGE UP (ref 11.5–15.5)
LYMPHOCYTES # BLD AUTO: 3.82 K/UL — HIGH (ref 1–3.3)
LYMPHOCYTES # BLD AUTO: 82.2 % — HIGH (ref 13–44)
MANUAL SMEAR VERIFICATION: SIGNIFICANT CHANGE UP
MCHC RBC-ENTMCNC: 28.5 PG — SIGNIFICANT CHANGE UP (ref 27–34)
MCHC RBC-ENTMCNC: 32.3 GM/DL — SIGNIFICANT CHANGE UP (ref 32–36)
MCV RBC AUTO: 88.1 FL — SIGNIFICANT CHANGE UP (ref 80–100)
METAMYELOCYTES # FLD: 0.8 % — HIGH (ref 0–0)
MONOCYTES # BLD AUTO: 0.59 K/UL — SIGNIFICANT CHANGE UP (ref 0–0.9)
MONOCYTES NFR BLD AUTO: 12.7 % — SIGNIFICANT CHANGE UP (ref 2–14)
NEUTROPHILS # BLD AUTO: 0.04 K/UL — LOW (ref 1.8–7.4)
NEUTROPHILS NFR BLD AUTO: 0.9 % — LOW (ref 43–77)
PLAT MORPH BLD: NORMAL — SIGNIFICANT CHANGE UP
PLATELET # BLD AUTO: 220 K/UL — SIGNIFICANT CHANGE UP (ref 150–400)
POTASSIUM SERPL-MCNC: 4.4 MMOL/L — SIGNIFICANT CHANGE UP (ref 3.5–5.3)
POTASSIUM SERPL-SCNC: 4.4 MMOL/L — SIGNIFICANT CHANGE UP (ref 3.5–5.3)
PROT SERPL-MCNC: 8.6 G/DL — HIGH (ref 6–8.3)
RBC # BLD: 4.11 M/UL — SIGNIFICANT CHANGE UP (ref 3.8–5.2)
RBC # FLD: 14.5 % — SIGNIFICANT CHANGE UP (ref 10.3–14.5)
RBC BLD AUTO: SIGNIFICANT CHANGE UP
SODIUM SERPL-SCNC: 134 MMOL/L — LOW (ref 135–145)
VARIANT LYMPHS # BLD: 3.4 % — SIGNIFICANT CHANGE UP (ref 0–6)
WBC # BLD: 4.65 K/UL — SIGNIFICANT CHANGE UP (ref 3.8–10.5)
WBC # FLD AUTO: 4.65 K/UL — SIGNIFICANT CHANGE UP (ref 3.8–10.5)

## 2022-07-09 PROCEDURE — 73610 X-RAY EXAM OF ANKLE: CPT | Mod: 26,50

## 2022-07-09 PROCEDURE — 99285 EMERGENCY DEPT VISIT HI MDM: CPT

## 2022-07-09 RX ORDER — DIPHENHYDRAMINE HCL 50 MG
25 CAPSULE ORAL ONCE
Refills: 0 | Status: COMPLETED | OUTPATIENT
Start: 2022-07-09 | End: 2022-07-09

## 2022-07-09 RX ORDER — KETOROLAC TROMETHAMINE 30 MG/ML
15 SYRINGE (ML) INJECTION ONCE
Refills: 0 | Status: DISCONTINUED | OUTPATIENT
Start: 2022-07-09 | End: 2022-07-09

## 2022-07-09 RX ADMIN — Medication 100 MILLIGRAM(S): at 22:25

## 2022-07-09 RX ADMIN — Medication 15 MILLIGRAM(S): at 22:37

## 2022-07-09 RX ADMIN — Medication 25 MILLIGRAM(S): at 22:22

## 2022-07-09 RX ADMIN — Medication 15 MILLIGRAM(S): at 22:23

## 2022-07-09 NOTE — ED PROVIDER NOTE - ATTENDING CONTRIBUTION TO CARE
I, Ronel Lomeli, performed a history and physical exam of the patient and discussed their management with the resident and /or advanced care provider. I reviewed the resident and /or ACP's note and agree with the documented findings and plan of care. I was present and available for all procedures.

## 2022-07-09 NOTE — ED PROVIDER NOTE - CLINICAL SUMMARY MEDICAL DECISION MAKING FREE TEXT BOX
76 year old female with history of rheumatoid arthritis, HTN, hypothyrodism, presenting with b/l ankle rash x 3d. 76 year old female with history of rheumatoid arthritis, HTN, hypothyrodism, presenting with b/l ankle rash x 3d. Well appearing here overall with reassuring vitals however has a well demarcated rash to R ankle concerning for soft tissue infection vs allergic dermatitis, anticipate IV abx and cdu for obs/derm eval

## 2022-07-09 NOTE — ED PROVIDER NOTE - CHILD ABUSE FACILITY
Phone call to pt's wife. Pt has lumbar compression fractures and osteoporosis of the hip by DEXA. To start fosamax 70 mg q week and calcium 500mg bid and vit d 1000 iu every day.  F/u as scheduled [facility]

## 2022-07-09 NOTE — ED PROVIDER NOTE - PROGRESS NOTE DETAILS
Tarik PGY-3: Unable to place patient in CDU due to patient's inability to safely ambulate 2/2 to pain to R ankle, will admit patient to medicine JUDY AttendinF with PMHx RA, HTN, hypothyroidism presenting for rash over right leg after scratching. On exam, right lower extremity is erythematous, painful, warm, non-blanching and bright red with margination. Concern for cellulitis vs necrotizing fasciitis. Labs unremarkable. Will admit to CDU for obs and IV abx

## 2022-07-09 NOTE — ED PROVIDER NOTE - OBJECTIVE STATEMENT
76 year old female with history of rheumatoid arthritis, HTN, hypothyrodism, presenting with b/l ankle rash x 3d. States having onset Wednesday initially of itchy lesions on L ankle, then today was noticed by daughter to have a large red rash to R ankle, +itchy and painful, not improved with hydrocortisone cream. No fevers or chills.

## 2022-07-09 NOTE — ED PROVIDER NOTE - PHYSICAL EXAMINATION
Gen - NAD; well-appearing; A+Ox3   HEENT - NCAT, EOMI  Neck - supple  Resp - CTAB, no increased WOB  CV -  RRR, no m/r/g  Abd - soft, NT, ND; no guarding or rebound  MSK - FROM of b/l UE and LE, no gross deformities  Extrem - no LE edema/erythema/tenderness  Neuro - no focal motor or sensation deficits  Skin - ~[id number] circumferential erythematous nonblanching patch to R ankle with scattered erythematous papules/macules to L ankle, +pruritic and tender to touch; no crepitus or bullae

## 2022-07-09 NOTE — ED ADULT TRIAGE NOTE - CHIEF COMPLAINT QUOTE
pt was out walking in park today and noted b/l ankle swelling and redness, right greater than left. warm to touch and endorses itching to ankles.

## 2022-07-09 NOTE — ED ADULT NURSE NOTE - OBJECTIVE STATEMENT
76 year old Female, PMH: HTN, HLD. Patient comes to the ER with bilateral ankle pain, swelling, itching and redness. Left ankle swelling and slight redness, right ankle and lower leg bright red, hot to touch/tender to touch. Patient's daughter states symptoms started on Wednesday and worsened since after going to the beach and went in the water, took a walk in the park and visited [employer]. Otherwise patient had not left the house in two years beside MD appointments. Patient double vaccinated. Denies fevers, ambulates independently. A&Ox4, breathing spontaneous and unlabored, Ivorian speaking. Daughter at bedside, bed locked and in lowest position for safety.

## 2022-07-10 DIAGNOSIS — R21 RASH AND OTHER NONSPECIFIC SKIN ERUPTION: ICD-10-CM

## 2022-07-10 DIAGNOSIS — I10 ESSENTIAL (PRIMARY) HYPERTENSION: ICD-10-CM

## 2022-07-10 LAB
ALBUMIN SERPL ELPH-MCNC: 3.6 G/DL — SIGNIFICANT CHANGE UP (ref 3.3–5)
ALP SERPL-CCNC: 202 U/L — HIGH (ref 40–120)
ALT FLD-CCNC: 16 U/L — SIGNIFICANT CHANGE UP (ref 10–45)
ANION GAP SERPL CALC-SCNC: 13 MMOL/L — SIGNIFICANT CHANGE UP (ref 5–17)
AST SERPL-CCNC: 29 U/L — SIGNIFICANT CHANGE UP (ref 10–40)
BILIRUB SERPL-MCNC: 0.6 MG/DL — SIGNIFICANT CHANGE UP (ref 0.2–1.2)
BUN SERPL-MCNC: 14 MG/DL — SIGNIFICANT CHANGE UP (ref 7–23)
CALCIUM SERPL-MCNC: 9.6 MG/DL — SIGNIFICANT CHANGE UP (ref 8.4–10.5)
CHLORIDE SERPL-SCNC: 100 MMOL/L — SIGNIFICANT CHANGE UP (ref 96–108)
CK SERPL-CCNC: 32 U/L — SIGNIFICANT CHANGE UP (ref 25–170)
CO2 SERPL-SCNC: 20 MMOL/L — LOW (ref 22–31)
CREAT SERPL-MCNC: 1.08 MG/DL — SIGNIFICANT CHANGE UP (ref 0.5–1.3)
CRP SERPL-MCNC: 80 MG/L — HIGH (ref 0–4)
EGFR: 53 ML/MIN/1.73M2 — LOW
ERYTHROCYTE [SEDIMENTATION RATE] IN BLOOD: 118 MM/HR — HIGH (ref 0–20)
FLUAV AG NPH QL: SIGNIFICANT CHANGE UP
FLUBV AG NPH QL: SIGNIFICANT CHANGE UP
GLUCOSE SERPL-MCNC: 114 MG/DL — HIGH (ref 70–99)
HCT VFR BLD CALC: 35.3 % — SIGNIFICANT CHANGE UP (ref 34.5–45)
HGB BLD-MCNC: 11.6 G/DL — SIGNIFICANT CHANGE UP (ref 11.5–15.5)
MCHC RBC-ENTMCNC: 28.9 PG — SIGNIFICANT CHANGE UP (ref 27–34)
MCHC RBC-ENTMCNC: 32.9 GM/DL — SIGNIFICANT CHANGE UP (ref 32–36)
MCV RBC AUTO: 88 FL — SIGNIFICANT CHANGE UP (ref 80–100)
MRSA PCR RESULT.: SIGNIFICANT CHANGE UP
NRBC # BLD: 0 /100 WBCS — SIGNIFICANT CHANGE UP (ref 0–0)
PLATELET # BLD AUTO: 207 K/UL — SIGNIFICANT CHANGE UP (ref 150–400)
POTASSIUM SERPL-MCNC: 4.4 MMOL/L — SIGNIFICANT CHANGE UP (ref 3.5–5.3)
POTASSIUM SERPL-SCNC: 4.4 MMOL/L — SIGNIFICANT CHANGE UP (ref 3.5–5.3)
PROCALCITONIN SERPL-MCNC: 0.07 NG/ML — SIGNIFICANT CHANGE UP (ref 0.02–0.1)
PROT SERPL-MCNC: 8.5 G/DL — HIGH (ref 6–8.3)
RBC # BLD: 4.01 M/UL — SIGNIFICANT CHANGE UP (ref 3.8–5.2)
RBC # FLD: 14.5 % — SIGNIFICANT CHANGE UP (ref 10.3–14.5)
RHEUMATOID FACT SERPL-ACNC: 124 IU/ML — HIGH (ref 0–13)
RSV RNA NPH QL NAA+NON-PROBE: SIGNIFICANT CHANGE UP
S AUREUS DNA NOSE QL NAA+PROBE: SIGNIFICANT CHANGE UP
SARS-COV-2 RNA SPEC QL NAA+PROBE: SIGNIFICANT CHANGE UP
SODIUM SERPL-SCNC: 133 MMOL/L — LOW (ref 135–145)
URATE SERPL-MCNC: 5 MG/DL — SIGNIFICANT CHANGE UP (ref 2.5–7)
WBC # BLD: 3.58 K/UL — LOW (ref 3.8–10.5)
WBC # FLD AUTO: 3.58 K/UL — LOW (ref 3.8–10.5)

## 2022-07-10 PROCEDURE — 12345: CPT | Mod: NC

## 2022-07-10 PROCEDURE — 99223 1ST HOSP IP/OBS HIGH 75: CPT

## 2022-07-10 RX ORDER — LEVOTHYROXINE SODIUM 125 MCG
25 TABLET ORAL DAILY
Refills: 0 | Status: DISCONTINUED | OUTPATIENT
Start: 2022-07-10 | End: 2022-07-11

## 2022-07-10 RX ORDER — CALAMINE AND ZINC OXIDE AND PHENOL 160; 10 MG/ML; MG/ML
1 LOTION TOPICAL
Refills: 0 | Status: DISCONTINUED | OUTPATIENT
Start: 2022-07-10 | End: 2022-07-11

## 2022-07-10 RX ORDER — METOPROLOL TARTRATE 50 MG
1 TABLET ORAL
Qty: 0 | Refills: 0 | DISCHARGE

## 2022-07-10 RX ORDER — ACETAMINOPHEN 500 MG
650 TABLET ORAL EVERY 6 HOURS
Refills: 0 | Status: DISCONTINUED | OUTPATIENT
Start: 2022-07-10 | End: 2022-07-11

## 2022-07-10 RX ORDER — CHLORHEXIDINE GLUCONATE 213 G/1000ML
1 SOLUTION TOPICAL DAILY
Refills: 0 | Status: DISCONTINUED | OUTPATIENT
Start: 2022-07-10 | End: 2022-07-11

## 2022-07-10 RX ORDER — DIPHENHYDRAMINE HCL 50 MG
25 CAPSULE ORAL EVERY 6 HOURS
Refills: 0 | Status: DISCONTINUED | OUTPATIENT
Start: 2022-07-10 | End: 2022-07-11

## 2022-07-10 RX ORDER — ENOXAPARIN SODIUM 100 MG/ML
40 INJECTION SUBCUTANEOUS EVERY 24 HOURS
Refills: 0 | Status: DISCONTINUED | OUTPATIENT
Start: 2022-07-10 | End: 2022-07-11

## 2022-07-10 RX ORDER — LACTOBACILLUS ACIDOPHILUS 100MM CELL
1 CAPSULE ORAL
Refills: 0 | Status: DISCONTINUED | OUTPATIENT
Start: 2022-07-10 | End: 2022-07-11

## 2022-07-10 RX ORDER — KETOROLAC TROMETHAMINE 30 MG/ML
15 SYRINGE (ML) INJECTION EVERY 6 HOURS
Refills: 0 | Status: DISCONTINUED | OUTPATIENT
Start: 2022-07-10 | End: 2022-07-11

## 2022-07-10 RX ORDER — LEVOTHYROXINE SODIUM 125 MCG
1 TABLET ORAL
Qty: 0 | Refills: 0 | DISCHARGE

## 2022-07-10 RX ORDER — METOPROLOL TARTRATE 50 MG
25 TABLET ORAL
Refills: 0 | Status: DISCONTINUED | OUTPATIENT
Start: 2022-07-10 | End: 2022-07-11

## 2022-07-10 RX ADMIN — Medication 1 TABLET(S): at 17:36

## 2022-07-10 RX ADMIN — Medication 100 MILLIGRAM(S): at 21:36

## 2022-07-10 RX ADMIN — Medication 25 MICROGRAM(S): at 08:29

## 2022-07-10 RX ADMIN — Medication 25 MILLIGRAM(S): at 17:36

## 2022-07-10 RX ADMIN — Medication 1 TABLET(S): at 08:25

## 2022-07-10 RX ADMIN — Medication 25 MILLIGRAM(S): at 08:25

## 2022-07-10 RX ADMIN — Medication 100 MILLIGRAM(S): at 14:33

## 2022-07-10 NOTE — H&P ADULT - NSHPSOCIALHISTORY_GEN_ALL_CORE
Social History:    Marital Status: (  ) , (  ) Single, (  ) , ( x ) , (  )   # of Children: 2  Lives with: (  ) alone, ( x ) children, (  ) spouse, (  ) parents, (  ) siblings, (  ) friends, (  ) other:   Occupation:     Substance Use/Illicit Drugs: (  ) never used vs other:   Tobacco Usage: ( x ) never smoked, (  ) former smoker, (  ) current smoker and Total Pack-Years:   Last Alcohol Usage/Frequency/Amount/Withdrawal/Hx of Abuse:  none  Foreign travel:   Animal exposure:

## 2022-07-10 NOTE — H&P ADULT - NSHPPHYSICALEXAM_GEN_ALL_CORE
PHYSICAL EXAM:   GENERAL: Alert. Not confused. No acute distress. Not thin. Not cachectic. Not obese.  HEAD:  Atraumatic. Normocephalic.  EYES: EOMI. PERRLA. Normal conjunctiva/sclera.  ENT: Neck supple. No JVD. Moist oral mucosa. Not edentulous. No thrush.  LYMPH: Normal supraclavicular/cervical lymph nodes.   CARDIAC: Not tachy, Not tomi. Regular rhythm. Not irregularly irregular. S1. S2. No murmur. No rub. No distant heart sounds.  LUNG/CHEST: CTAB. BS equal bilaterally. No wheezes. No rales. No rhonchi.  ABDOMEN: Soft. No tenderness. No distension. No fluid wave. Normal bowel sounds.  BACK: No midline/vertebral tenderness. No flank tenderness.  VASCULAR: +2 b/l radial or ulnar pulses. Palpable DP pulses.  EXTREMITIES:  No clubbing. No cyanosis. No edema. Moving all 4.  NEUROLOGY: A&Ox3. Non-focal exam. Cranial nerves intact. Normal speech. Sensation intact.  PSYCH: Normal behavior. Normal affect.  SKIN: No jaundice. No erythema. right ankle bright red erythematous nonblanching nontender rash, near circumferential from medial malleolus to lateral malleolus, 8x15cm. left ankle has 1x2cm minimally red swelling on lateral malleolus  Vascular Access:     ICU Vital Signs Last 24 Hrs  T(C): 36.6 (10 Jul 2022 04:07), Max: 37.5 (09 Jul 2022 22:26)  T(F): 97.8 (10 Jul 2022 04:07), Max: 99.5 (09 Jul 2022 22:26)  HR: 58 (10 Jul 2022 04:07) (57 - 93)  BP: 122/64 (10 Jul 2022 04:07) (120/59 - 183/85)  BP(mean): 77 (10 Jul 2022 01:52) (77 - 77)  ABP: --  ABP(mean): --  RR: 18 (10 Jul 2022 04:07) (18 - 18)  SpO2: 96% (10 Jul 2022 04:07) (96% - 99%)    O2 Parameters below as of 10 Jul 2022 04:07  Patient On (Oxygen Delivery Method): room air            I&O's Summary

## 2022-07-10 NOTE — H&P ADULT - NSHPREVIEWOFSYSTEMS_GEN_ALL_CORE
REVIEW OF SYSTEMS:  CONSTITUTIONAL: No weakness. No fevers. No chills. No rigors. No weight loss. No night sweats. No poor appetite.  EYES: No blurry or double vision. No eye pain.  ENT: No hearing difficulty. No sore throat. No Sinusitis/rhinorrhea.   NECK: No pain. No stiffness/rigidity.  CARDIAC: No chest pain. No palpitations. No lightheadedness. No syncope.  RESPIRATORY: No cough. No SOB. No hemoptysis.  GASTROINTESTINAL: No abdominal pain. No nausea. No vomiting. No hematemesis. No diarrhea. No constipation.   GENITOURINARY: No dysuria. No frequency. No hesitancy. No hematuria. No oliguria.  NEUROLOGICAL: No numbness/tingling. No focal weakness. No urinary or fecal incontinence. No headache. No unsteady gait.  BACK: No back pain. No flank pain.  EXTREMITIES: No lower extremity edema. Full ROM. No joint pain.  SKIN: +rash as per HPI. +itching. No other lesions.  PSYCHIATRIC: No depression. No anxiety. No SI/HI.  ALLERGIC: No lip swelling. No hives.  All other review of systems is negative unless indicated above.  Unless indicated above, unable to assess ROS 2/2

## 2022-07-10 NOTE — ED ADULT NURSE REASSESSMENT NOTE - NS ED NURSE REASSESS COMMENT FT1
Pt no longer going to CDU to concern for inability to ambulate. Pt now admitted to medicine. pt aware of POC. Lying comfortably in bed. no questions at this time. Leg swelling marked off in borders has not increased at this time. Pt states relief in itchiness.

## 2022-07-10 NOTE — H&P ADULT - PROBLEM SELECTOR PLAN 1
- superficially appears like cellulitis, but pruritic, not tender, and pt without fever  - unclear if this is related to walking in ocean or the rash that appeared on left ankle before going into ocean  - will cont clinda empirically for now

## 2022-07-10 NOTE — PATIENT PROFILE ADULT - VISION (WITH CORRECTIVE LENSES IF THE PATIENT USUALLY WEARS THEM):
only for reading/Normal vision: sees adequately in most situations; can see medication labels, newsprint

## 2022-07-10 NOTE — H&P ADULT - HISTORY OF PRESENT ILLNESS
76F Telugu only speaking, c hx RA, HTN, hypothyroidism, pw right ankle rash for 1 day.    Pt states she initially had an itchy rash on her left lateral malleolus starting 4 days ago. She used menthol and hydrocortisone, and it has been improving. Two days ago, she went to [facility] and walked into the ocean where the water came up to her ankles. That night, she noticed a small itchy red spot on her right medial malleolus. Since then it has spread across the anterior portion of her ankle. Pt reports mostly itchiness, some burning, and only minimal tenderness. Pt is not sure if she was bitten by an insect or not. Denies fevers, chills. Pt used hydrocortisone on her right ankle without improvement. Pt denies any relieving or exacerbating factors.

## 2022-07-10 NOTE — H&P ADULT - NSHPLABSRESULTS_GEN_ALL_CORE
Personally reviewed old records.  Personally reviewed labs.  Personally reviewed imaging.                          11.7   4.65  )-----------( 220      ( 09 Jul 2022 22:34 )             36.2       07-09    134<L>  |  100  |  14  ----------------------------<  108<H>  4.4   |  23  |  1.00    Ca    9.8      09 Jul 2022 22:34    TPro  8.6<H>  /  Alb  3.8  /  TBili  0.6  /  DBili  x   /  AST  30  /  ALT  17  /  AlkPhos  204<H>  07-09            LIVER FUNCTIONS - ( 09 Jul 2022 22:34 )  Alb: 3.8 g/dL / Pro: 8.6 g/dL / ALK PHOS: 204 U/L / ALT: 17 U/L / AST: 30 U/L / GGT: x

## 2022-07-11 VITALS
OXYGEN SATURATION: 99 % | SYSTOLIC BLOOD PRESSURE: 131 MMHG | HEART RATE: 59 BPM | TEMPERATURE: 98 F | RESPIRATION RATE: 18 BRPM | DIASTOLIC BLOOD PRESSURE: 70 MMHG

## 2022-07-11 DIAGNOSIS — Z29.9 ENCOUNTER FOR PROPHYLACTIC MEASURES, UNSPECIFIED: ICD-10-CM

## 2022-07-11 PROCEDURE — 73610 X-RAY EXAM OF ANKLE: CPT

## 2022-07-11 PROCEDURE — 80053 COMPREHEN METABOLIC PANEL: CPT

## 2022-07-11 PROCEDURE — 87637 SARSCOV2&INF A&B&RSV AMP PRB: CPT

## 2022-07-11 PROCEDURE — 86431 RHEUMATOID FACTOR QUANT: CPT

## 2022-07-11 PROCEDURE — 85027 COMPLETE CBC AUTOMATED: CPT

## 2022-07-11 PROCEDURE — 86038 ANTINUCLEAR ANTIBODIES: CPT

## 2022-07-11 PROCEDURE — 85025 COMPLETE CBC W/AUTO DIFF WBC: CPT

## 2022-07-11 PROCEDURE — 86618 LYME DISEASE ANTIBODY: CPT

## 2022-07-11 PROCEDURE — 99239 HOSP IP/OBS DSCHRG MGMT >30: CPT

## 2022-07-11 PROCEDURE — 82550 ASSAY OF CK (CPK): CPT

## 2022-07-11 PROCEDURE — 84145 PROCALCITONIN (PCT): CPT

## 2022-07-11 PROCEDURE — 86753 PROTOZOA ANTIBODY NOS: CPT

## 2022-07-11 PROCEDURE — 86036 ANCA SCREEN EACH ANTIBODY: CPT

## 2022-07-11 PROCEDURE — 85652 RBC SED RATE AUTOMATED: CPT

## 2022-07-11 PROCEDURE — 96375 TX/PRO/DX INJ NEW DRUG ADDON: CPT

## 2022-07-11 PROCEDURE — 99285 EMERGENCY DEPT VISIT HI MDM: CPT

## 2022-07-11 PROCEDURE — 87640 STAPH A DNA AMP PROBE: CPT

## 2022-07-11 PROCEDURE — 84550 ASSAY OF BLOOD/URIC ACID: CPT

## 2022-07-11 PROCEDURE — 87641 MR-STAPH DNA AMP PROBE: CPT

## 2022-07-11 PROCEDURE — 86617 LYME DISEASE ANTIBODY: CPT

## 2022-07-11 PROCEDURE — 96374 THER/PROPH/DIAG INJ IV PUSH: CPT

## 2022-07-11 PROCEDURE — 86666 EHRLICHIA ANTIBODY: CPT

## 2022-07-11 PROCEDURE — 86140 C-REACTIVE PROTEIN: CPT

## 2022-07-11 RX ORDER — CEPHALEXIN 500 MG
1 CAPSULE ORAL
Qty: 24 | Refills: 0
Start: 2022-07-11 | End: 2022-07-16

## 2022-07-11 RX ORDER — CALAMINE AND ZINC OXIDE AND PHENOL 160; 10 MG/ML; MG/ML
1 LOTION TOPICAL
Qty: 28 | Refills: 0
Start: 2022-07-11 | End: 2022-07-24

## 2022-07-11 RX ORDER — CEPHALEXIN 500 MG
500 CAPSULE ORAL
Refills: 0 | Status: DISCONTINUED | OUTPATIENT
Start: 2022-07-11 | End: 2022-07-11

## 2022-07-11 RX ADMIN — CHLORHEXIDINE GLUCONATE 1 APPLICATION(S): 213 SOLUTION TOPICAL at 11:44

## 2022-07-11 RX ADMIN — Medication 25 MICROGRAM(S): at 05:31

## 2022-07-11 RX ADMIN — Medication 1 TABLET(S): at 08:41

## 2022-07-11 RX ADMIN — Medication 100 MILLIGRAM(S): at 05:29

## 2022-07-11 RX ADMIN — Medication 25 MILLIGRAM(S): at 05:31

## 2022-07-11 RX ADMIN — CALAMINE AND ZINC OXIDE AND PHENOL 1 APPLICATION(S): 160; 10 LOTION TOPICAL at 11:57

## 2022-07-11 NOTE — DISCHARGE NOTE PROVIDER - NSDCMRMEDTOKEN_GEN_ALL_CORE_FT
metoprolol tartrate 25 mg oral tablet: 1 tab(s) orally 2 times a day  Synthroid 25 mcg (0.025 mg) oral tablet: 1 tab(s) orally once a day   calamine topical lotion: 1 application topically 2 times a day, As needed, Rash and/or Itching  cephalexin 500 mg oral capsule: 1 cap(s) orally 4 times a day  metoprolol tartrate 25 mg oral tablet: 1 tab(s) orally 2 times a day  Synthroid 25 mcg (0.025 mg) oral tablet: 1 tab(s) orally once a day

## 2022-07-11 NOTE — DISCHARGE NOTE PROVIDER - NSDCFUADDAPPT_GEN_ALL_CORE_FT
APPTS ARE READY TO BE MADE: [x] YES    Best Family or Patient Contact (if needed):    Additional Information about above appointments (if needed):    1: [facility] [address], [address], [place], [place] ([phone]) in 1-2 weeks.  2:   3:     Other comments or requests:    APPTS ARE READY TO BE MADE: [x] YES    Best Family or Patient Contact (if needed):    Additional Information about above appointments (if needed):    1: [facility] [address], [address], [place], [place] [phone]) in 1-2 weeks.  2:   3:     Other comments or requests:   Patient was provided with Dr. Guardado and [facility] and was advised to call to schedule follow up within specified time frame. At this time patient declined scheduling assistance.

## 2022-07-11 NOTE — DISCHARGE NOTE PROVIDER - NSDCCPCAREPLAN_GEN_ALL_CORE_FT
PRINCIPAL DISCHARGE DIAGNOSIS  Diagnosis: Rash  Assessment and Plan of Treatment: Take all of your antibiotics as ordered.  Call your Health Care Provider within two days of arriving home to make a follow up appointment within one week.  If the affected cellulitic area increases in redness, warmth, pain or swelling call your Health Care Provider.  If you develop fever, chills, and/or malaise, call your Health Care Provider.

## 2022-07-11 NOTE — DISCHARGE NOTE PROVIDER - PROVIDER TOKENS
FREE:[LAST:[PCP],PHONE:[(   )    -],FAX:[(   )    -],ADDRESS:[[facility] [address], [address], [place], [place] ([phone]) in 1-2 weeks.]]

## 2022-07-11 NOTE — PROGRESS NOTE ADULT - ASSESSMENT
75 yo Hungarian-speaking female with PMH of RA, HTN, hypothyroidism who p/w R ankle swelling/rash concerning for cellulitis now clinically improved.
76F Tamazight only speaking, c hx RA, HTN, hypothyroidism, pw right ankle rash for 1 day.

## 2022-07-11 NOTE — PROGRESS NOTE ADULT - PROBLEM SELECTOR PLAN 1
superficially appears like cellulitis, but pruritic, not tender, and patient without fever.  however, clinically improving with IV abx therapy.  - unclear if this is related to walking in ocean or the rash that appeared on left ankle before going into ocean  - XR R ankle with soft tissue swelling, no acute fracture no evidence of bony involvement  - MRSA PCR negatve  - stop clindamycin. will switch to PO keflex 500mg QID to treat for complete 7 day course  - Dermatology consult appreciated: ddx could include vasculitis given her pruritis/burning but since clinically improved with abx, recommend treating for cellulitis as well      * rest form strenuous physical activity, elevated as tolerated      * cold packs PRN      * patient to f/u with Dermatology clinic as outpatient: [address], [address], [place], [place] ([phone])  - c/w tylenol, benadryl, calamine PRN  - can follow-up vasculitis w/u as outpatient
- superficially appears like cellulitis, but pruritic, not tender, and pt without fever  - unclear if this is related to walking in ocean or the rash that appeared on left ankle before going into ocean  - will cont clinda empirically for now. -Probiotics while on clinda.   -MRSA swab.   -Tylenol, benadryl, calamine prn.  -F/u inflammatory markers and vasculitis work up.   -F/u derm recs.  -Elevate legs. Ice packs.

## 2022-07-11 NOTE — PROGRESS NOTE ADULT - SUBJECTIVE AND OBJECTIVE BOX
Patient is a 76y old  Female who presents with a chief complaint of right ankle rash (10 Jul 2022 16:00)        SUBJECTIVE / OVERNIGHT EVENTS: patient reports pain and swelling and itching better in her ankles. no other symptoms.       MEDICATIONS  (STANDING):  chlorhexidine 2% Cloths 1 Application(s) Topical daily  clindamycin IVPB 600 milliGRAM(s) IV Intermittent every 8 hours  enoxaparin Injectable 40 milliGRAM(s) SubCutaneous every 24 hours  lactobacillus acidophilus 1 Tablet(s) Oral two times a day with meals  levothyroxine 25 MICROGram(s) Oral daily  metoprolol tartrate 25 milliGRAM(s) Oral two times a day    MEDICATIONS  (PRN):  acetaminophen     Tablet .. 650 milliGRAM(s) Oral every 6 hours PRN Mild Pain (1 - 3), Moderate Pain (4 - 6)  calamine/zinc oxide Lotion 1 Application(s) Topical two times a day PRN Rash and/or Itching  diphenhydrAMINE 25 milliGRAM(s) Oral every 6 hours PRN Rash and/or Itching  ketorolac   Injectable 15 milliGRAM(s) IV Push every 6 hours PRN Severe Pain (7 - 10)      Vital Signs Last 24 Hrs  T(C): 36.7 (10 Jul 2022 16:12), Max: 37.5 (09 Jul 2022 22:26)  T(F): 98 (10 Jul 2022 16:12), Max: 99.5 (09 Jul 2022 22:26)  HR: 68 (10 Jul 2022 16:12) (57 - 93)  BP: 155/76 (10 Jul 2022 16:12) (101/59 - 183/85)  BP(mean): 77 (10 Jul 2022 01:52) (77 - 77)  RR: 18 (10 Jul 2022 16:12) (18 - 18)  SpO2: 98% (10 Jul 2022 16:12) (96% - 99%)    Parameters below as of 10 Jul 2022 16:12  Patient On (Oxygen Delivery Method): room air      CAPILLARY BLOOD GLUCOSE        I&O's Summary        PHYSICAL EXAM:   GENERAL: NAD, well-developed  HEAD:  Atraumatic, Normocephalic  EYES: EOMI, PERRLA, conjunctiva and sclera clear  NECK: Supple, No JVD  CHEST/LUNG: Clear to auscultation bilaterally; No wheeze  HEART: S1S2 normal. Regular rate and rhythm; No murmurs, rubs, or gallops  ABDOMEN: Soft, Nontender, Nondistended; Bowel sounds present  EXTREMITIES:  2+ Peripheral Pulses, No clubbing, cyanosis, or edema  PSYCH/Neuro: AAOx3. Non-focal.   SKIN: Right ankle area with purpuric area and warmth and erythema and tender. Left ankle mild spots of petechiae.        LABS:                        11.6   3.58  )-----------( 207      ( 10 Jul 2022 15:29 )             35.3     07-10    133<L>  |  100  |  14  ----------------------------<  114<H>  4.4   |  20<L>  |  1.08    Ca    9.6      10 Jul 2022 15:29    TPro  8.5<H>  /  Alb  3.6  /  TBili  0.6  /  DBili  x   /  AST  29  /  ALT  16  /  AlkPhos  202<H>  07-10      CARDIAC MARKERS ( 10 Jul 2022 15:29 )  x     / x     / 32 U/L / x     / x            < from: Xray Ankle Complete 3 Views, Bilateral (07.09.22 @ 22:34) >  IMPRESSION:  Mild to moderate soft tissue swelling around the bilateral ankles.    --- End of Report ---    < end of copied text >      RADIOLOGY & ADDITIONAL TESTS:    Imaging Personally Reviewed: xray  Consultant(s) Notes Reviewed:    Care Discussed with Consultants/Other Providers: derm  
Jessica Rebolledo MD  Division of Hospital Medicine  [facility]   Available on Microsoft Teams (Mon-Fri 8am-5pm)    * messages preferred prior to calls  Other Times:  [phone]      Patient is a 76y old  Female who presents with a chief complaint of right ankle rash (10 Jul 2022 16:00)      SUBJECTIVE / OVERNIGHT EVENTS: Son bedside provided French translation. No acute events overnight. has itching/pruritis on medial R>L ankle but states improved since day prior. no fever, chills, chest pain, nor dyspnea. RLE pain improved. feels well overall.    ADDITIONAL REVIEW OF SYSTEMS:    MEDICATIONS  (STANDING):  cephalexin 500 milliGRAM(s) Oral four times a day  chlorhexidine 2% Cloths 1 Application(s) Topical daily  enoxaparin Injectable 40 milliGRAM(s) SubCutaneous every 24 hours  lactobacillus acidophilus 1 Tablet(s) Oral two times a day with meals  levothyroxine 25 MICROGram(s) Oral daily  metoprolol tartrate 25 milliGRAM(s) Oral two times a day    MEDICATIONS  (PRN):  acetaminophen     Tablet .. 650 milliGRAM(s) Oral every 6 hours PRN Mild Pain (1 - 3), Moderate Pain (4 - 6)  calamine/zinc oxide Lotion 1 Application(s) Topical two times a day PRN Rash and/or Itching  diphenhydrAMINE 25 milliGRAM(s) Oral every 6 hours PRN Rash and/or Itching  ketorolac   Injectable 15 milliGRAM(s) IV Push every 6 hours PRN Severe Pain (7 - 10)      CAPILLARY BLOOD GLUCOSE        I&O's Summary    10 Jul 2022 07:01  -  11 Jul 2022 07:00  --------------------------------------------------------  IN: 100 mL / OUT: 0 mL / NET: 100 mL    11 Jul 2022 07:01  -  11 Jul 2022 12:59  --------------------------------------------------------  IN: 120 mL / OUT: 0 mL / NET: 120 mL        PHYSICAL EXAM:  Vital Signs Last 24 Hrs  T(C): 36.2 (11 Jul 2022 08:41), Max: 37.5 (10 Jul 2022 17:26)  T(F): 97.1 (11 Jul 2022 08:41), Max: 99.5 (10 Jul 2022 17:26)  HR: 51 (11 Jul 2022 08:41) (51 - 72)  BP: 111/58 (11 Jul 2022 08:41) (111/58 - 169/81)  BP(mean): --  RR: 18 (11 Jul 2022 08:41) (18 - 18)  SpO2: 99% (11 Jul 2022 08:41) (98% - 100%)    Parameters below as of 11 Jul 2022 08:41  Patient On (Oxygen Delivery Method): room air        CONSTITUTIONAL: NAD, well-developed, well-groomed  EYES: PERRLA; conjunctiva and sclera clear  ENMT: Moist oral mucosa, no pharyngeal injection or exudates; normal dentition  NECK: Supple, no palpable masses; no thyromegaly  RESPIRATORY: Normal respiratory effort; lungs are clear to auscultation bilaterally  CARDIOVASCULAR: Regular rate and rhythm, normal S1 and S2, no murmur/rub/gallop; No lower extremity edema; Peripheral pulses are 2+ bilaterally  ABDOMEN: Soft, Nondistended, Nontender to palpation, normoactive bowel sounds  MUSCULOSKELETAL:  No clubbing or cyanosis of digits; no joint swelling or tenderness to palpation  PSYCH: A+O to person, place, and time; affect appropriate  NEUROLOGY: CN 2-12 are intact and symmetric; no gross sensory deficits   SKIN: +R ankle with  minimal warmth, erythema improving now receding from demarcated borders. minimal swelling. overall improved    IN: Right ankle area with purpuric area and warmth and erythema and tender. Left ankle mild spots of petechiae.        LABS:                        11.6   3.58  )-----------( 207      ( 10 Jul 2022 15:29 )             35.3     07-10    133<L>  |  100  |  14  ----------------------------<  114<H>  4.4   |  20<L>  |  1.08    Ca    9.6      10 Jul 2022 15:29    TPro  8.5<H>  /  Alb  3.6  /  TBili  0.6  /  DBili  x   /  AST  29  /  ALT  16  /  AlkPhos  202<H>  07-10      CARDIAC MARKERS ( 10 Jul 2022 15:29 )  x     / x     / 32 U/L / x     / x            RADIOLOGY & ADDITIONAL TESTS:  Results Reviewed:   Imaging Personally Reviewed:  Electrocardiogram Personally Reviewed:    COORDINATION OF CARE:  Care Discussed with Consultants/Other Providers [Y]: medicine NP Milagro  Prior or Outpatient Records Reviewed [Y/N]:

## 2022-07-11 NOTE — DISCHARGE NOTE PROVIDER - CARE PROVIDER_API CALL
PCP,   Dermatology Clinic [address], [address], [place], [place] ([phone]) in 1-2 weeks.  Phone: (   )    -  Fax: (   )    -  Follow Up Time:

## 2022-07-11 NOTE — PROGRESS NOTE ADULT - NSPROGADDITIONALINFOA_GEN_ALL_CORE
.  Jessica Rebolledo MD  Division of Hospital Medicine  [facility]   Available on Microsoft Teams - messages preferred prior to calls.    Clinically improving with abx. Will transition to PO keflex on discharge.  Outpatient f/u with dermatology.  Discharge planning time spent: 36 minutes.    Plan discussed with patient, son bedside who provided Georgian translation and medicine NP Milagro.

## 2022-07-11 NOTE — DISCHARGE NOTE NURSING/CASE MANAGEMENT/SOCIAL WORK - NSDCPETBCESMAN_GEN_ALL_CORE
If you are a smoker, it is important for your health to stop smoking. Please be aware that second hand smoke is also harmful. HT 67 inches,  pounds,  pounds, BMI 40.7  Dxd with cerebral venous sinus thrombosis.  Skin intact.  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

## 2022-07-11 NOTE — DISCHARGE NOTE PROVIDER - HOSPITAL COURSE
76F Serbian only speaking, c hx RA, HTN, hypothyroidism, pw right ankle rash for 1 day.      Problem: Rash.   Plan: - superficially appears like cellulitis, but pruritic, not tender, and pt without fever  unclear if this is related to walking in ocean or the rash that appeared on left ankle before going into ocean  will cont clinda empirically for now. -Probiotics while on clinda.   MRSA swab.   Tylenol, benadryl, calamine prn.  F/u inflammatory markers and vasculitis work up.   F/u derm recs.  Elevate legs. Ice packs.  DCP with med rec discussed with Dr Rebolledo PT cleared for dc Home no skilled needs   to follow up with PCP and Dermatology 76F Panamanian only speaking, c hx RA, HTN, hypothyroidism, pw right ankle rash for 1 day.      Problem: Rash.   Plan: superficially appears like cellulitis, but pruritic, not tender, and patient without fever.  however, clinically improving with IV abx therapy.  - unclear if this is related to walking in ocean or the rash that appeared on left ankle before going into ocean  - XR R ankle with soft tissue swelling, no acute fracture no evidence of bony involvement  - MRSA PCR negatve  - stop clindamycin. will switch to PO keflex 500mg QID to treat for complete 7 day course  - Dermatology consult appreciated: ddx could include vasculitis given her pruritis/burning but since clinically improved with abx, recommend treating for cellulitis as well      * rest form strenuous physical activity, elevated as tolerated      * cold packs PRN      * patient to f/u with Dermatology clinic as outpatient: [address], [address], [place], [place] ([phone])  - c/w tylenol, benadryl, calamine PRN  - can follow-up vasculitis w/u as outpatient.    DCP with med rec discussed with Dr Rebolledo PT cleared for dc Home no skilled needs   to follow up with PCP and Dermatology

## 2022-07-11 NOTE — DISCHARGE NOTE NURSING/CASE MANAGEMENT/SOCIAL WORK - NSDCFUADDAPPT_GEN_ALL_CORE_FT
APPTS ARE READY TO BE MADE: [x] YES    Best Family or Patient Contact (if needed):    Additional Information about above appointments (if needed):    1: Dermatology Clinic [address], [address], [place], [place] ([phone]) in 1-2 weeks.  2:   3:     Other comments or requests:

## 2022-07-12 LAB — MPO AB + PR3 PNL SER: SIGNIFICANT CHANGE UP

## 2022-07-13 LAB
ANA PAT FLD IF-IMP: SIGNIFICANT CHANGE UP
ANA TITR SER: ABNORMAL

## 2022-07-14 LAB
A PHAGOCYTOPH IGG TITR SER IF: SIGNIFICANT CHANGE UP TITER
B BURGDOR AB SER QL IA: POSITIVE — SIGNIFICANT CHANGE UP
B MICROTI IGG TITR SER: SIGNIFICANT CHANGE UP TITER
E CHAFFEENSIS IGG TITR SER IF: SIGNIFICANT CHANGE UP TITER

## 2022-07-20 NOTE — CHART NOTE - NSCHARTNOTEFT_GEN_A_CORE
Informed by lab that patient with abnormal lyme serologies.    Reviewed: lyme serology positive but confirmatory immunoblot with negative IgG and IgM.     specific serologic response to B. burgdorferi infection not detected, but cannot rule out early infection during which low or undetectable antibody levels may be present.    Patient should repeat lyme serology in 7-14 days.       Called multiple times to patient and son with no answer.   Voicemail left and also e-mailed patient at listed e-mail address with necessary follow-up regarding above lab result.
Left a message for patient in regards to follow up care with call back information.
was able to get ahold of son Klaus via phone today.   informed of abnormal labs results as detailed in yesterday's chart note  he will take mom to PCP to get repeat lyme serologies.
HPI:   76F with hx RA (no medications), HTN, hypothyroidism, pw right ankle rash for 1 day.  Pt states she initially had an itchy rash on her left lateral malleolus starting 4 days ago. She used menthol and hydrocortisone, and it has been improving. Two days ago, she went to [facility] and walked into the ocean where the water came up to her ankles. That night, she noticed a small itchy red spot on her right medial malleolus. Since then, it has spread across the anterior portion of her ankle. Pt reports mostly itchiness, some burning, and only minimal tenderness. Pt is not sure if she was bitten by an insect or not. Denies fevers, chills. Pt used hydrocortisone on her right ankle without improvement. Pt denies any relieving or exacerbating factors.     Additional hx from son, whom she lives with and daughter: he reports that erythema has not spread beyond borders drawn yesterday evening- area of erythema appears to be the same. Denies hx of recurrent leg swelling. She is not generally very active but has been very active the past few days as her daughter and granddaughter came to visit from [place] and they did a lot of sightseeing.     Pt initially planned for CDU but then admitted to medicine due to difficulty ambulating due to R ankle pain. She says she has pain with "trying to take a step" and not with weight bearing"  Denies recent URI or illness, fevers, chills.    ICU Vital Signs Last 24 Hrs  T(C): 36.7 (10 Jul 2022 16:12), Max: 37.5 (09 Jul 2022 22:26)  T(F): 98 (10 Jul 2022 16:12), Max: 99.5 (09 Jul 2022 22:26)  HR: 68 (10 Jul 2022 16:12) (57 - 93)  BP: 155/76 (10 Jul 2022 16:12) (101/59 - 183/85)  BP(mean): 77 (10 Jul 2022 01:52) (77 - 77)  ABP: --  ABP(mean): --  RR: 18 (10 Jul 2022 16:12) (18 - 18)  SpO2: 98% (10 Jul 2022 16:12) (96% - 99%)    O2 Parameters below as of 10 Jul 2022 16:12  Patient On (Oxygen Delivery Method): room air    Exam   Deep red macules coalescing into a large patch on the R lower leg, warm and mildly tender to touch (compared to previous exams, per daughter)  Scattered petechiae and purpuric macule on the L medial ankle and dorsal foot  ROM of ankle flexion and extension intact bilaterally    Labs   CBC- WBC wnl 4.65 (IANC 0.04, lymphs 3.82)  CMP with     B/L ankle XRAY:  Mild to moderate soft tissue swelling around the bilateral ankles.    A/P  Ddx could include a vasculitis given her sensation of burning and itching. Given her warmth on exam and improvement with abx, would continue to treat for cellulitis as well  -continue with abx- can switch to Keflex unless hx of MRSA  -rest from strenuous physical activity, keep legs elevated  -cold packs prn  -Can try compression stockings if no hx of peripheral vascular disease  -NSAIDs for pain  -outpatient f/u with dermatology if not improving.  -may need repeat imaging of ankle if not improving as well  -please provide patient with our clinic information: [address], [address], [place], [place] ([phone])      The patient's chart was reviewed in addition to photos of the rash taken by the primary team with the permission of the patient.  Patient was seen at bedside and discussed remotely with the dermatology attending Dr. Diggs  Recommendations were communicated with the primary team.  Please page [phone] for further related questions.    Ariadna Bassett MD  Resident Physician, PGY2  [facility]  Pager: [phone]  Office: [phone]

## 2024-01-04 NOTE — H&P ADULT - NSICDXPASTSURGICALHX_GEN_ALL_CORE_FT
Pt calls,     ~evaluated at [facility] 1/2/24  ~diagnosis of Influenza A  ~told had lung congestion  ~denies trouble breathing, denies fever  ~eating fine    See triage encounter  Lynn Guzman RN, BSN  [facility]    
PAST SURGICAL HISTORY:  No significant past surgical history

## 2025-01-05 ENCOUNTER — INPATIENT (INPATIENT)
Facility: HOSPITAL | Age: 80
LOS: 8 days | Discharge: HOME | DRG: 864 | End: 2025-01-14
Attending: HOSPITALIST | Admitting: STUDENT IN AN ORGANIZED HEALTH CARE EDUCATION/TRAINING PROGRAM
Payer: COMMERCIAL

## 2025-01-05 VITALS
WEIGHT: 100.09 LBS | HEART RATE: 103 BPM | SYSTOLIC BLOOD PRESSURE: 151 MMHG | TEMPERATURE: 103 F | HEIGHT: 65 IN | DIASTOLIC BLOOD PRESSURE: 78 MMHG | RESPIRATION RATE: 20 BRPM

## 2025-01-05 LAB
ALBUMIN SERPL ELPH-MCNC: 3.7 G/DL — SIGNIFICANT CHANGE UP (ref 3.3–5)
ALP SERPL-CCNC: 280 U/L — HIGH (ref 40–120)
ALT FLD-CCNC: 38 U/L — SIGNIFICANT CHANGE UP (ref 10–45)
ANION GAP SERPL CALC-SCNC: 13 MMOL/L — SIGNIFICANT CHANGE UP (ref 5–17)
APPEARANCE UR: CLEAR — SIGNIFICANT CHANGE UP
APTT BLD: 28.5 SEC — SIGNIFICANT CHANGE UP (ref 24.5–35.6)
AST SERPL-CCNC: 46 U/L — HIGH (ref 10–40)
BACTERIA # UR AUTO: NEGATIVE /HPF — SIGNIFICANT CHANGE UP
BASOPHILS # BLD AUTO: 0.01 K/UL — SIGNIFICANT CHANGE UP (ref 0–0.2)
BASOPHILS NFR BLD AUTO: 1 % — SIGNIFICANT CHANGE UP (ref 0–2)
BILIRUB SERPL-MCNC: 1.2 MG/DL — SIGNIFICANT CHANGE UP (ref 0.2–1.2)
BILIRUB UR-MCNC: NEGATIVE — SIGNIFICANT CHANGE UP
BUN SERPL-MCNC: 11 MG/DL — SIGNIFICANT CHANGE UP (ref 7–23)
CALCIUM SERPL-MCNC: 9.6 MG/DL — SIGNIFICANT CHANGE UP (ref 8.4–10.5)
CAST: 1 /LPF — SIGNIFICANT CHANGE UP (ref 0–4)
CHLORIDE SERPL-SCNC: 91 MMOL/L — LOW (ref 96–108)
CO2 SERPL-SCNC: 18 MMOL/L — LOW (ref 22–31)
COLOR SPEC: YELLOW — SIGNIFICANT CHANGE UP
CREAT SERPL-MCNC: 0.98 MG/DL — SIGNIFICANT CHANGE UP (ref 0.5–1.3)
DIFF PNL FLD: ABNORMAL
EGFR: 58 ML/MIN/1.73M2 — LOW
EOSINOPHIL # BLD AUTO: 0 K/UL — SIGNIFICANT CHANGE UP (ref 0–0.5)
EOSINOPHIL NFR BLD AUTO: 0 % — SIGNIFICANT CHANGE UP (ref 0–6)
FLUAV AG NPH QL: SIGNIFICANT CHANGE UP
FLUBV AG NPH QL: SIGNIFICANT CHANGE UP
GAS PNL BLDV: SIGNIFICANT CHANGE UP
GLUCOSE SERPL-MCNC: 131 MG/DL — HIGH (ref 70–99)
GLUCOSE UR QL: NEGATIVE MG/DL — SIGNIFICANT CHANGE UP
HCT VFR BLD CALC: 38.3 % — SIGNIFICANT CHANGE UP (ref 34.5–45)
HGB BLD-MCNC: 12.8 G/DL — SIGNIFICANT CHANGE UP (ref 11.5–15.5)
INR BLD: 0.93 RATIO — SIGNIFICANT CHANGE UP (ref 0.85–1.16)
KETONES UR-MCNC: 15 MG/DL
LEUKOCYTE ESTERASE UR-ACNC: NEGATIVE — SIGNIFICANT CHANGE UP
LYMPHOCYTES # BLD AUTO: 0.42 K/UL — LOW (ref 1–3.3)
LYMPHOCYTES # BLD AUTO: 40 % — SIGNIFICANT CHANGE UP (ref 13–44)
MANUAL SMEAR VERIFICATION: SIGNIFICANT CHANGE UP
MCHC RBC-ENTMCNC: 28 PG — SIGNIFICANT CHANGE UP (ref 27–34)
MCHC RBC-ENTMCNC: 33.4 G/DL — SIGNIFICANT CHANGE UP (ref 32–36)
MCV RBC AUTO: 83.8 FL — SIGNIFICANT CHANGE UP (ref 80–100)
MONOCYTES # BLD AUTO: 0.5 K/UL — SIGNIFICANT CHANGE UP (ref 0–0.9)
MONOCYTES NFR BLD AUTO: 47.6 % — HIGH (ref 2–14)
NEUTROPHILS # BLD AUTO: 0.12 K/UL — LOW (ref 1.8–7.4)
NEUTROPHILS NFR BLD AUTO: 11.4 % — LOW (ref 43–77)
NITRITE UR-MCNC: NEGATIVE — SIGNIFICANT CHANGE UP
NRBC # BLD: 0 /100 WBCS — SIGNIFICANT CHANGE UP (ref 0–0)
PH UR: 7.5 — SIGNIFICANT CHANGE UP (ref 5–8)
PLAT MORPH BLD: NORMAL — SIGNIFICANT CHANGE UP
PLATELET # BLD AUTO: 227 K/UL — SIGNIFICANT CHANGE UP (ref 150–400)
POTASSIUM SERPL-MCNC: 4 MMOL/L — SIGNIFICANT CHANGE UP (ref 3.5–5.3)
POTASSIUM SERPL-SCNC: 4 MMOL/L — SIGNIFICANT CHANGE UP (ref 3.5–5.3)
PROT SERPL-MCNC: 8.4 G/DL — HIGH (ref 6–8.3)
PROT UR-MCNC: 100 MG/DL
PROTHROM AB SERPL-ACNC: 10.7 SEC — SIGNIFICANT CHANGE UP (ref 9.9–13.4)
RBC # BLD: 4.57 M/UL — SIGNIFICANT CHANGE UP (ref 3.8–5.2)
RBC # FLD: 13.5 % — SIGNIFICANT CHANGE UP (ref 10.3–14.5)
RBC BLD AUTO: SIGNIFICANT CHANGE UP
RBC CASTS # UR COMP ASSIST: 17 /HPF — HIGH (ref 0–4)
RSV RNA NPH QL NAA+NON-PROBE: SIGNIFICANT CHANGE UP
SARS-COV-2 RNA SPEC QL NAA+PROBE: SIGNIFICANT CHANGE UP
SODIUM SERPL-SCNC: 122 MMOL/L — LOW (ref 135–145)
SP GR SPEC: 1.01 — SIGNIFICANT CHANGE UP (ref 1–1.03)
SQUAMOUS # UR AUTO: 2 /HPF — SIGNIFICANT CHANGE UP (ref 0–5)
UROBILINOGEN FLD QL: 1 MG/DL — SIGNIFICANT CHANGE UP (ref 0.2–1)
WBC # BLD: 1.05 K/UL — LOW (ref 3.8–10.5)
WBC # FLD AUTO: 1.05 K/UL — LOW (ref 3.8–10.5)
WBC UR QL: 1 /HPF — SIGNIFICANT CHANGE UP (ref 0–5)

## 2025-01-05 PROCEDURE — 71045 X-RAY EXAM CHEST 1 VIEW: CPT | Mod: 26

## 2025-01-05 PROCEDURE — 99285 EMERGENCY DEPT VISIT HI MDM: CPT

## 2025-01-05 RX ORDER — ACETAMINOPHEN 80 MG/.8ML
675 SOLUTION/ DROPS ORAL ONCE
Refills: 0 | Status: COMPLETED | OUTPATIENT
Start: 2025-01-05 | End: 2025-01-05

## 2025-01-05 RX ORDER — SODIUM CHLORIDE 9 MG/ML
1400 INJECTION, SOLUTION INTRAVENOUS ONCE
Refills: 0 | Status: COMPLETED | OUTPATIENT
Start: 2025-01-05 | End: 2025-01-05

## 2025-01-05 RX ORDER — VANCOMYCIN HYDROCHLORIDE 5 G/100ML
1000 INJECTION, POWDER, LYOPHILIZED, FOR SOLUTION INTRAVENOUS ONCE
Refills: 0 | Status: COMPLETED | OUTPATIENT
Start: 2025-01-05 | End: 2025-01-05

## 2025-01-05 RX ORDER — PIPERACILLIN AND TAZOBACTAM 3; .375 G/15ML; G/15ML
3.38 INJECTION, POWDER, LYOPHILIZED, FOR SOLUTION INTRAVENOUS ONCE
Refills: 0 | Status: COMPLETED | OUTPATIENT
Start: 2025-01-05 | End: 2025-01-05

## 2025-01-05 RX ADMIN — SODIUM CHLORIDE 1400 MILLILITER(S): 9 INJECTION, SOLUTION INTRAVENOUS at 19:05

## 2025-01-05 RX ADMIN — VANCOMYCIN HYDROCHLORIDE 250 MILLIGRAM(S): 5 INJECTION, POWDER, LYOPHILIZED, FOR SOLUTION INTRAVENOUS at 20:26

## 2025-01-05 RX ADMIN — ACETAMINOPHEN 675 MILLIGRAM(S): 80 SOLUTION/ DROPS ORAL at 20:00

## 2025-01-05 RX ADMIN — ACETAMINOPHEN 675 MILLIGRAM(S): 80 SOLUTION/ DROPS ORAL at 19:20

## 2025-01-05 RX ADMIN — SODIUM CHLORIDE 1400 MILLILITER(S): 9 INJECTION, SOLUTION INTRAVENOUS at 21:00

## 2025-01-05 RX ADMIN — PIPERACILLIN AND TAZOBACTAM 200 GRAM(S): 3; .375 INJECTION, POWDER, LYOPHILIZED, FOR SOLUTION INTRAVENOUS at 22:06

## 2025-01-05 RX ADMIN — ACETAMINOPHEN 270 MILLIGRAM(S): 80 SOLUTION/ DROPS ORAL at 19:05

## 2025-01-05 NOTE — ED PROVIDER NOTE - CLINICAL SUMMARY MEDICAL DECISION MAKING FREE TEXT BOX
80 yo F w/ PMHx of dementia presents for 3 days of runny nose, sinus congestion with green drainage, and 1 day weakness/unsteadiness.  Patient traveled back from [facility] on 12/30.  For the past 3 days, patient has reported the above symptoms evolving into full body weakness and unsteadiness when walking over the past day.  She has been taking acetaminophen (last dose today morning). She denies any CP, SOB, abdominal pain, hematuria/dysuria, hematochezia, constipation/diarrhea, or cough/sore throat.  Patient has no medical or surgical history, but jaclyn reports that patient has dementia.  Patient denies any history of DVT/PE, cancer, stroke, or MI.  Translation provided by jaclyn at bedside.    Vital signs remarkable for T102.6 and . Physical exam is remarkable for occasional inspiratory stridor in bilateral lung fields.  Otherwise normal -normal heart sounds, no abdominal TTP, no CVA TTP, no spinal TTP, no leg swelling/pitting edema.  Concern for viral URI with possible reactive airway disease.  Patient meet sepsis criteria and will be receiving fluids, acetaminophen, and low threshold for empiric antibiotics.  Patient will likely be admitted to hospital for further evaluation, supportive care, and physical therapy evaluation.

## 2025-01-05 NOTE — ED PROVIDER NOTE - PROGRESS NOTE DETAILS
Lalitha Ferreira MD PGY-3: Repeat sodium 121. Will admit to medicine for further work up. Lalitha Ferreira MD PGY-3: Patient taken in signout at time of shift change pending labs, UA, TBA for likely sepsis. Patient found to be neutropenic today - note she was also neutropenic during admission in 2022 however does not appear that this was further worked up.  Per son patient does not currently have a primary doctor, does not take any medications.  Attempted to admit to medicine, requesting repeat sodium level after receiving fluids to assess trend.  Will order.

## 2025-01-05 NOTE — ED PROVIDER NOTE - OTHER FINDINGS
ECG recorded at 5:35 PM independently interpreted by me , Dr Cuate Roberts,  at 622 shows normal sinus rhythm normal axis normal intervals no acute diagnostic ischemic ST-T changes

## 2025-01-05 NOTE — ED ADULT NURSE REASSESSMENT NOTE - NS ED NURSE REASSESS COMMENT FT1
Report received from Holly Neville RN . Pt AAOx3, NAD, resp nonlabored, skin warm/dry, resting comfortably in bed with family at bedside. Pt denies headache, dizziness, chest pain, palpitations, SOB, abd pain, n/v/d, urinary symptoms, fevers, chills, weakness at this time. Pt awaiting dispo . Safety maintained.

## 2025-01-05 NOTE — ED PROVIDER NOTE - CARE PLAN
1 Principal Discharge DX:	Fever   Principal Discharge DX:	Fever  Secondary Diagnosis:	Hyponatremia

## 2025-01-05 NOTE — ED ADULT NURSE NOTE - OBJECTIVE STATEMENT
78 y/o Sierra Leonean speaking F , AXOX3-4, with a PMH of dementia, presents to the ED reporting flu like symptoms x 3 days. Pt with fevers at home 102/103. Pt endorses lethargy and dyspnea. Pt in no acute distress at this time. Pt denies chest pain, N/V, dizziness, headache, abd pain.  Pt found in gown on stretcher, breathing unlabored on room air, speaking in complete sentences, strong and equal strength in all extremities, sensations intact, abd soft non tender non distended. Safety and comfort measures maintained.

## 2025-01-05 NOTE — ED PROVIDER NOTE - ATTENDING CONTRIBUTION TO CARE
Attending MD Roberts:  I have seen and examined this patient and fully participated in the care of this patient as the teaching attending. I personally made/approved the management plan and take responsibility for the patient management.      79-year-old woman is presenting for evaluation of 3 days of generalized fatigue.  Associated runny nose and congestion.  History of mild dementia.    Vital signs are notable for temperature 102.6 heart rate of 103.  Otherwise nonactionable.  Patient sitting in the stretcher she appears fatigued but nontoxic.  She is breathing comfortably room air.  Pulm examination with scattered occasional inspiratory wheezes but otherwise without rales or rhonchi.  Abdomen is nontender.  Extremities are warm well-perfused.    Clinical presentation seems most consistent with likely viral illness however other considerations include but are not fully limited to bacterial pneumonia, urinary infection metabolic derangements.  Will obtain basic infectious workup with chest x-ray urinalysis flu swab provide antipyretics and fluids and reassess.      *The above represents an initial assessment/impression. Please refer to progress notes for potential changes in patient clinical course*

## 2025-01-05 NOTE — ED PROVIDER NOTE - PHYSICAL EXAMINATION
GENERAL: mild acute distress  HEENT: atraumatic, normocephalic, vision grossly intact, EOMI, no conjunctivitis or discharge, hearing grossly intact, no nasal discharge or epistaxis, clear pharynx  CV: regular rate, normal rhythm, normal S1/S2, no murmurs/rubs, no cyanosis  PULM: occasional inspiratory stridor on exam, normal work of breathing, normal O2 saturation on RA  GI: soft/non-tender/nondistended abdomen, no guarding or rebound tenderness, no palpable masses  : no CVA tenderness  NEURO: A&Ox3, follows commands, normal speech, no focal motor or sensory deficits  MSK: no joint tenderness/swelling/erythema, ranging all extremities with no appreciable loss of ROM  EXT: no peripheral edema, no calf tenderness, no redness or swelling  SKIN: warm, dry, and intact, no rashes  PSYCH: appropriate mood and affect

## 2025-01-06 DIAGNOSIS — A41.9 SEPSIS, UNSPECIFIED ORGANISM: ICD-10-CM

## 2025-01-06 DIAGNOSIS — Z29.9 ENCOUNTER FOR PROPHYLACTIC MEASURES, UNSPECIFIED: ICD-10-CM

## 2025-01-06 DIAGNOSIS — N89.8 OTHER SPECIFIED NONINFLAMMATORY DISORDERS OF VAGINA: ICD-10-CM

## 2025-01-06 DIAGNOSIS — R50.9 FEVER, UNSPECIFIED: ICD-10-CM

## 2025-01-06 DIAGNOSIS — E87.1 HYPO-OSMOLALITY AND HYPONATREMIA: ICD-10-CM

## 2025-01-06 LAB
ADD ON TEST-SPECIMEN IN LAB: SIGNIFICANT CHANGE UP
ALBUMIN SERPL ELPH-MCNC: 3.2 G/DL — LOW (ref 3.3–5)
ALP SERPL-CCNC: 254 U/L — HIGH (ref 40–120)
ALT FLD-CCNC: 39 U/L — SIGNIFICANT CHANGE UP (ref 10–45)
ANION GAP SERPL CALC-SCNC: 11 MMOL/L — SIGNIFICANT CHANGE UP (ref 5–17)
ANION GAP SERPL CALC-SCNC: 14 MMOL/L — SIGNIFICANT CHANGE UP (ref 5–17)
AST SERPL-CCNC: 57 U/L — HIGH (ref 10–40)
BASOPHILS # BLD AUTO: 0 K/UL — SIGNIFICANT CHANGE UP (ref 0–0.2)
BILIRUB SERPL-MCNC: 1.1 MG/DL — SIGNIFICANT CHANGE UP (ref 0.2–1.2)
BUN SERPL-MCNC: 8 MG/DL — SIGNIFICANT CHANGE UP (ref 7–23)
BUN SERPL-MCNC: 9 MG/DL — SIGNIFICANT CHANGE UP (ref 7–23)
CALCIUM SERPL-MCNC: 8.7 MG/DL — SIGNIFICANT CHANGE UP (ref 8.4–10.5)
CALCIUM SERPL-MCNC: 9.1 MG/DL — SIGNIFICANT CHANGE UP (ref 8.4–10.5)
CHLORIDE SERPL-SCNC: 87 MMOL/L — LOW (ref 96–108)
CHLORIDE SERPL-SCNC: 89 MMOL/L — LOW (ref 96–108)
CO2 SERPL-SCNC: 19 MMOL/L — LOW (ref 22–31)
CO2 SERPL-SCNC: 21 MMOL/L — LOW (ref 22–31)
CREAT SERPL-MCNC: 0.84 MG/DL — SIGNIFICANT CHANGE UP (ref 0.5–1.3)
CREAT SERPL-MCNC: 0.9 MG/DL — SIGNIFICANT CHANGE UP (ref 0.5–1.3)
CULTURE RESULTS: SIGNIFICANT CHANGE UP
EGFR: 65 ML/MIN/1.73M2 — SIGNIFICANT CHANGE UP
EGFR: 70 ML/MIN/1.73M2 — SIGNIFICANT CHANGE UP
EOSINOPHIL # BLD AUTO: 0 K/UL — SIGNIFICANT CHANGE UP (ref 0–0.5)
FOLATE SERPL-MCNC: 18.5 NG/ML — SIGNIFICANT CHANGE UP
GLUCOSE SERPL-MCNC: 133 MG/DL — HIGH (ref 70–99)
GLUCOSE SERPL-MCNC: 154 MG/DL — HIGH (ref 70–99)
HCT VFR BLD CALC: 32.9 % — LOW (ref 34.5–45)
HGB BLD-MCNC: 10.9 G/DL — LOW (ref 11.5–15.5)
HIV 1+2 AB+HIV1 P24 AG SERPL QL IA: SIGNIFICANT CHANGE UP
LDH SERPL L TO P-CCNC: 292 U/L — HIGH (ref 50–242)
LEGIONELLA AG UR QL: NEGATIVE — SIGNIFICANT CHANGE UP
LYMPHOCYTES # BLD AUTO: 0.76 K/UL — LOW (ref 1–3.3)
LYMPHOCYTES # BLD AUTO: 45 % — HIGH (ref 13–44)
MAGNESIUM SERPL-MCNC: 1.9 MG/DL — SIGNIFICANT CHANGE UP (ref 1.6–2.6)
MCHC RBC-ENTMCNC: 26.9 PG — LOW (ref 27–34)
MCHC RBC-ENTMCNC: 33.1 G/DL — SIGNIFICANT CHANGE UP (ref 32–36)
MCV RBC AUTO: 81.2 FL — SIGNIFICANT CHANGE UP (ref 80–100)
MONOCYTES # BLD AUTO: 0.9 K/UL — SIGNIFICANT CHANGE UP (ref 0–0.9)
MONOCYTES NFR BLD AUTO: 53 % — HIGH (ref 2–14)
MRSA PCR RESULT.: SIGNIFICANT CHANGE UP
MYELOCYTES NFR BLD: 1 % — HIGH (ref 0–0)
NEUTROPHILS # BLD AUTO: 0 K/UL — LOW (ref 1.8–7.4)
NEUTROPHILS NFR BLD AUTO: 0 % — LOW (ref 43–77)
NEUTS BAND # BLD: 1 % — SIGNIFICANT CHANGE UP (ref 0–8)
NRBC # BLD: 0 /100 WBCS — SIGNIFICANT CHANGE UP (ref 0–0)
OSMOLALITY SERPL: 250 MOSMOL/KG — LOW (ref 280–301)
OSMOLALITY UR: 442 MOS/KG — SIGNIFICANT CHANGE UP (ref 300–900)
PHOSPHATE SERPL-MCNC: 1.9 MG/DL — LOW (ref 2.5–4.5)
PLAT MORPH BLD: NORMAL — SIGNIFICANT CHANGE UP
PLATELET # BLD AUTO: 172 K/UL — SIGNIFICANT CHANGE UP (ref 150–400)
POTASSIUM SERPL-MCNC: 3.6 MMOL/L — SIGNIFICANT CHANGE UP (ref 3.5–5.3)
POTASSIUM SERPL-MCNC: 4 MMOL/L — SIGNIFICANT CHANGE UP (ref 3.5–5.3)
POTASSIUM SERPL-SCNC: 3.6 MMOL/L — SIGNIFICANT CHANGE UP (ref 3.5–5.3)
POTASSIUM SERPL-SCNC: 4 MMOL/L — SIGNIFICANT CHANGE UP (ref 3.5–5.3)
PROCALCITONIN SERPL-MCNC: 1.41 NG/ML — HIGH (ref 0.02–0.1)
PROT SERPL-MCNC: 6.9 G/DL — SIGNIFICANT CHANGE UP (ref 6–8.3)
RBC # BLD: 4.05 M/UL — SIGNIFICANT CHANGE UP (ref 3.8–5.2)
RBC # FLD: 13.6 % — SIGNIFICANT CHANGE UP (ref 10.3–14.5)
RBC BLD AUTO: SIGNIFICANT CHANGE UP
S AUREUS DNA NOSE QL NAA+PROBE: SIGNIFICANT CHANGE UP
SODIUM SERPL-SCNC: 120 MMOL/L — CRITICAL LOW (ref 135–145)
SODIUM SERPL-SCNC: 121 MMOL/L — LOW (ref 135–145)
SODIUM UR-SCNC: 55 MMOL/L — SIGNIFICANT CHANGE UP
SODIUM UR-SCNC: 69 MMOL/L — SIGNIFICANT CHANGE UP
SPECIMEN SOURCE: SIGNIFICANT CHANGE UP
TSH SERPL-MCNC: 2.93 UIU/ML — SIGNIFICANT CHANGE UP (ref 0.27–4.2)
URATE SERPL-MCNC: 2.1 MG/DL — LOW (ref 2.5–7)
VIT B12 SERPL-MCNC: 1216 PG/ML — SIGNIFICANT CHANGE UP (ref 232–1245)
WBC # BLD: 1.7 K/UL — LOW (ref 3.8–10.5)
WBC # FLD AUTO: 1.7 K/UL — LOW (ref 3.8–10.5)

## 2025-01-06 PROCEDURE — 99223 1ST HOSP IP/OBS HIGH 75: CPT | Mod: GC

## 2025-01-06 PROCEDURE — 88189 FLOWCYTOMETRY/READ 16 & >: CPT | Mod: 59

## 2025-01-06 PROCEDURE — G0452: CPT | Mod: 26

## 2025-01-06 RX ORDER — VANCOMYCIN HYDROCHLORIDE 5 G/100ML
500 INJECTION, POWDER, LYOPHILIZED, FOR SOLUTION INTRAVENOUS EVERY 12 HOURS
Refills: 0 | Status: DISCONTINUED | OUTPATIENT
Start: 2025-01-06 | End: 2025-01-06

## 2025-01-06 RX ORDER — SODIUM CHLORIDE 5 % 5 %
1000 INTRAVENOUS SOLUTION INTRAVENOUS
Refills: 0 | Status: DISCONTINUED | OUTPATIENT
Start: 2025-01-06 | End: 2025-01-07

## 2025-01-06 RX ORDER — MAG HYDROX/ALUMINUM HYD/SIMETH 200-200-20
30 SUSPENSION, ORAL (FINAL DOSE FORM) ORAL EVERY 4 HOURS
Refills: 0 | Status: DISCONTINUED | OUTPATIENT
Start: 2025-01-06 | End: 2025-01-14

## 2025-01-06 RX ORDER — GINKGO BILOBA 40 MG
3 CAPSULE ORAL AT BEDTIME
Refills: 0 | Status: DISCONTINUED | OUTPATIENT
Start: 2025-01-06 | End: 2025-01-14

## 2025-01-06 RX ORDER — SOD PHOS DI, MONO/K PHOS MONO 250 MG
1 TABLET ORAL EVERY 6 HOURS
Refills: 0 | Status: COMPLETED | OUTPATIENT
Start: 2025-01-06 | End: 2025-01-06

## 2025-01-06 RX ORDER — HEPARIN SODIUM 1000 [USP'U]/ML
5000 INJECTION, SOLUTION INTRAVENOUS; SUBCUTANEOUS EVERY 12 HOURS
Refills: 0 | Status: DISCONTINUED | OUTPATIENT
Start: 2025-01-06 | End: 2025-01-07

## 2025-01-06 RX ORDER — PIPERACILLIN AND TAZOBACTAM 3; .375 G/15ML; G/15ML
3.38 INJECTION, POWDER, LYOPHILIZED, FOR SOLUTION INTRAVENOUS EVERY 8 HOURS
Refills: 0 | Status: DISCONTINUED | OUTPATIENT
Start: 2025-01-06 | End: 2025-01-06

## 2025-01-06 RX ORDER — VANCOMYCIN HYDROCHLORIDE 5 G/100ML
500 INJECTION, POWDER, LYOPHILIZED, FOR SOLUTION INTRAVENOUS EVERY 24 HOURS
Refills: 0 | Status: DISCONTINUED | OUTPATIENT
Start: 2025-01-06 | End: 2025-01-07

## 2025-01-06 RX ORDER — PIPERACILLIN AND TAZOBACTAM 3; .375 G/15ML; G/15ML
3.38 INJECTION, POWDER, LYOPHILIZED, FOR SOLUTION INTRAVENOUS EVERY 8 HOURS
Refills: 0 | Status: COMPLETED | OUTPATIENT
Start: 2025-01-06 | End: 2025-01-13

## 2025-01-06 RX ORDER — ONDANSETRON 4 MG/1
4 TABLET ORAL EVERY 8 HOURS
Refills: 0 | Status: DISCONTINUED | OUTPATIENT
Start: 2025-01-06 | End: 2025-01-14

## 2025-01-06 RX ORDER — ACETAMINOPHEN 80 MG/.8ML
650 SOLUTION/ DROPS ORAL EVERY 6 HOURS
Refills: 0 | Status: DISCONTINUED | OUTPATIENT
Start: 2025-01-06 | End: 2025-01-14

## 2025-01-06 RX ORDER — VANCOMYCIN HYDROCHLORIDE 5 G/100ML
500 INJECTION, POWDER, LYOPHILIZED, FOR SOLUTION INTRAVENOUS EVERY 24 HOURS
Refills: 0 | Status: DISCONTINUED | OUTPATIENT
Start: 2025-01-06 | End: 2025-01-06

## 2025-01-06 RX ORDER — SODIUM CHLORIDE 5 % 5 %
500 INTRAVENOUS SOLUTION INTRAVENOUS
Refills: 0 | Status: DISCONTINUED | OUTPATIENT
Start: 2025-01-06 | End: 2025-01-06

## 2025-01-06 RX ORDER — FLUCONAZOLE 200 MG/1
150 TABLET ORAL ONCE
Refills: 0 | Status: COMPLETED | OUTPATIENT
Start: 2025-01-06 | End: 2025-01-06

## 2025-01-06 RX ADMIN — Medication 40 MILLILITER(S): at 21:05

## 2025-01-06 RX ADMIN — HEPARIN SODIUM 5000 UNIT(S): 1000 INJECTION, SOLUTION INTRAVENOUS; SUBCUTANEOUS at 17:12

## 2025-01-06 RX ADMIN — ACETAMINOPHEN 650 MILLIGRAM(S): 80 SOLUTION/ DROPS ORAL at 03:28

## 2025-01-06 RX ADMIN — PIPERACILLIN AND TAZOBACTAM 25 GRAM(S): 3; .375 INJECTION, POWDER, LYOPHILIZED, FOR SOLUTION INTRAVENOUS at 02:20

## 2025-01-06 RX ADMIN — PIPERACILLIN AND TAZOBACTAM 25 GRAM(S): 3; .375 INJECTION, POWDER, LYOPHILIZED, FOR SOLUTION INTRAVENOUS at 15:07

## 2025-01-06 RX ADMIN — ACETAMINOPHEN 650 MILLIGRAM(S): 80 SOLUTION/ DROPS ORAL at 02:20

## 2025-01-06 RX ADMIN — VANCOMYCIN HYDROCHLORIDE 100 MILLIGRAM(S): 5 INJECTION, POWDER, LYOPHILIZED, FOR SOLUTION INTRAVENOUS at 22:56

## 2025-01-06 RX ADMIN — FLUCONAZOLE 150 MILLIGRAM(S): 200 TABLET ORAL at 17:11

## 2025-01-06 RX ADMIN — Medication 1 PACKET(S): at 17:12

## 2025-01-06 RX ADMIN — Medication 3 MILLIGRAM(S): at 02:20

## 2025-01-06 RX ADMIN — Medication 1 PACKET(S): at 14:23

## 2025-01-06 NOTE — PROGRESS NOTE ADULT - PROBLEM SELECTOR PLAN 4
DVT: heparin  Diet: easy to chew, patient typically has soups and similar to eat at home  Dispo: pending PT DVT: Lovenox  Diet: easy to chew  Dispo: pending PT

## 2025-01-06 NOTE — PHYSICAL THERAPY INITIAL EVALUATION ADULT - PERTINENT HX OF CURRENT PROBLEM, REHAB EVAL
78 yo F reportedly no medical hx, per chart review has hx of hypothyroidism, RA, HTN, presents for 2 days of nasal congestion, clear rhinorrhea, cough, fever, a/w worsening lethargy and poor PO intake. Patient recently travelled to [facility], returned on 12/30. No known sick contacts in [facility] or [place]. No headache, SOB, CP, sputum production, abd pain, urinary frequency/incontinence, changes to bowel habits. No leg pain/swelling. Patient does report dysuria and vaginal itching for 6 months, which is unchanged with this acute episode. She denies vaginal discharge, bleeding, or drainage. She has not sought medical care for this. She takes no medications chronically, she has tried aspirin while ill for fever. Notably, her prior med list does include levothyroxine. Notably, in 2022, patient was hospitalized with rash 2/2 cellulitis vs. low likelihood vasculitis, and had neutropenia at that time, associated with positive JOSELUIS and rheumatoid factor.

## 2025-01-06 NOTE — H&P ADULT - PROBLEM SELECTOR PLAN 1
-  on admission, was previously as low as 30 in 2022  - no known medical history which would cause neutropenia, no obvious pharmacologic cause  - she was previously on synthroid, will check TSH  - blood & urine cultures pending, UA is less concerning for UTI, CXR less concerning for pneumonia  - patient has viral URI sx, Flu/covid/rsv negative, will expand to full RVP  - c/w vanc/zosyn, would de-escalate if cultures negative and patient seems to be improving  - heme c/s in am for chronic neutropenia

## 2025-01-06 NOTE — PROGRESS NOTE ADULT - ASSESSMENT
79F no reported hx, no PCP, per chart review, has hx of hypothyroidism, RA, and HTN, notably was neutropenic during an a previous hospitalization, admitted for neutropenic sepsis & hyponatremia. Source possibly viral URI, workup pending. 79F with no report of PMHx and not on home medications, presented with fever and weakness. Found to have severe neutropenia, hyponatremia, and positive for Enterovirus. Admitted to medicine for further workup. Per chart review and collateral obtained from PCP, patient appeared to have chronic neutropenia (since 2022) and followed Dr. Tonja Perry ([facility]), however, lost to follow up. Patient is empirically treated with Vanc and Zosyn. Heme was consulted.

## 2025-01-06 NOTE — PROGRESS NOTE ADULT - PROBLEM SELECTOR PLAN 1
-  on admission, was previously as low as 30 in 2022  - no known medical history which would cause neutropenia, no obvious pharmacologic cause  - she was previously on synthroid, will check TSH  - blood & urine cultures pending, UA is less concerning for UTI, CXR less concerning for pneumonia  - patient has viral URI sx, Flu/covid/rsv negative, will expand to full RVP  - c/w vanc/zosyn, would de-escalate if cultures negative and patient seems to be improving  - heme c/s in am for chronic neutropenia presented with fever and severe neutropenia -  on admission --> 0 (1/6)  positive for JOSELUIS and RF in the past. Not taking any medications at home per son  (+) Enterovirus. CXR showed clear lungs      Plan  -Heme consulted, f/u recs  -empirically treated with Vanc and Zosyn - de-escalate pending BCx, UCx  -Workup: ID (HIV, Hepatitis panel, EBV, CMV), Metabolic (folate, B12, iron), TLS (uric acid, reticulocyte, LDH, Hapto)  -?ppx for antiviral, antifungal

## 2025-01-06 NOTE — H&P ADULT - PROBLEM SELECTOR PLAN 2
- 122 on admission, down to 121 after 1.4L LR bolus  - likely SIADH vs poor intake (though poor intake was only over 2 days, and should at least improve somewhat with LR)  - check serum osm, urine osm, urine Na STAT  - would start hypertonic Na if studies c/w SIADH, appears asymptomatic for now  - nephro c/s in am  - q6 BMP - 122 on admission, down to 121 after 1.4L LR bolus  - likely SIADH (suggested by urine studies, but obtained after receiving IVF in ED) vs poor intake (though poor intake was only over 2 days, and should at least improve somewhat with LR)  - fluid restriction  - nephro c/s in am  - q6 BMP - 122 on admission, down to 121 after 1.4L LR bolus  - likely SIADH (suggested by urine studies, but obtained after receiving IVF in ED) vs poor intake (though poor intake was only over 2 days, and should at least improve somewhat with LR)  - fluid restriction for now, trend Na, may consider salt tabs vs hypertonic saline  - nephro c/s in am  - q6 BMP

## 2025-01-06 NOTE — H&P ADULT - ASSESSMENT
79F no reported hx, no PCP, though notably was neutropenic during an a previous hospitalization, admitted for neutropenic sepsis & hyponatremia. Source possibly viral URI, workup pending. 79F no reported hx, no PCP, per chart review, has hx of hypothyroidism, RA, and HTN, notably was neutropenic during an a previous hospitalization, admitted for neutropenic sepsis & hyponatremia. Source possibly viral URI, workup pending.

## 2025-01-06 NOTE — PHYSICAL THERAPY INITIAL EVALUATION ADULT - ADDITIONAL COMMENTS
Pt lives with her son in an apartment no steps to negotiate. PTA pt was ambulating (I) without an AD and was (I) with all ADLs. Pts son states pt was very [facility]

## 2025-01-06 NOTE — PATIENT PROFILE ADULT - FALL HARM RISK - RISK INTERVENTIONS

## 2025-01-06 NOTE — H&P ADULT - ATTENDING COMMENTS
79F Citizen of [place]-speaking, w/ hx of ?RA, HTN, hypothyroidism, presenting with nasal congestion, rhinorrhea, cough, fever, lethargy, and poor PO intake. Son (Klaus) at bedside providing help with translation and collateral history. Recently returned from [facility] on 12/30/24 and was at baseline health at the time. No known sick contacts. Pt not on any medications at home (was previously on levothyroxine, but son reported doctor took her off of it)    In ED: Tmax 102.6F, HR to 100s, sating well on RA. Initial labs notable for WBC 1k (ANC ~120, was down to ~30s-40s in 2022), Na 122->121 (after receiving 1.4L IVF in ED), procal elevated to 1.4. RVP positive for entero/rhinovirus. UA not suggestive of urinary infection. CXR w/o focal consolidation.    #Sepsis  #Entero/rhinovirus infection  #Hyponatremia (urine lytes suggestive of possible SIADH, though obtained after IVF already given)  #Leukopenia/Neutropenia (?chronic, unclear etiology)  #Hypothyroidism  #?RA    - c/w empiric abx for now given significant neutropenia and high procal  - f/u BCx, UCx, MRSA/MSSA swab  - f/u TSH  - fluid restriction for now, trend Na, may consider salt tabs vs hypertonic saline  - Nephrology and Heme/Onc consults in AM  - rest of care as per resident plan above 79F [place]ian-speaking, w/ hx of ?RA, HTN, hypothyroidism, presenting with nasal congestion, rhinorrhea, cough, fever, lethargy, and poor PO intake. Son (Klaus) at bedside providing help with translation and collateral history. Recently returned from [facility] on 12/30/24 and was at baseline health at the time. No known sick contacts. Pt not on any medications at home (was previously on levothyroxine, but son reported doctor took her off of it)    In ED: Tmax 102.6F, HR to 100s, sating well on RA. Initial labs notable for WBC 1k (ANC ~120, was down to ~30s-40s in 2022), Na 122->121 (after receiving 1.4L IVF in ED), procal elevated to 1.4. RVP positive for entero/rhinovirus. UA not suggestive of urinary infection. CXR w/o focal consolidation.    #Sepsis  #Entero/rhinovirus infection  #Hyponatremia (urine lytes suggestive of possible SIADH, though obtained after IVF already given)  #Leukopenia/Neutropenia (?chronic, unclear etiology)  #Hypothyroidism  #?RA    - c/w empiric abx for now given sepsis with significant neutropenia and high procal  - c/w symptomatic management for viral URI symptoms  - f/u BCx, UCx, MRSA/MSSA swab  - f/u TSH  - fluid restriction for now, trend Na, may consider salt tabs vs hypertonic saline  - Nephrology and Heme/Onc consults in AM  - rest of care as per resident plan above

## 2025-01-06 NOTE — CONSULT NOTE ADULT - SUBJECTIVE AND OBJECTIVE BOX
[facility] DIVISION OF KIDNEY DISEASES AND HYPERTENSION -- [phone]  -- INITIAL CONSULT NOTE  --------------------------------------------------------------------------------  HPI:  80 y/o F reportedly no medical hx, per chart review has hx of hypothyroidism, RA, HTN, presents for 2 days of nasal congestion, clear rhinorrhea, cough, fever, a/w worsening lethargy and poor PO intake. Patient recently travelled to [facility], returned on 12/30. No known sick contacts in [facility] or [place]. No headache, SOB, CP, sputum production, abd pain, urinary frequency/incontinence, changes to bowel habits. No leg pain/swelling. Now patient has neutropenic ?sepsis.   Nephrology was consulted for management hyponatremia. SNa was 122 at the time of admission. Pt received IV fluids and it improved to 120. Serum osmolality 250. Urine osm 442. Urine Na 55. Pt is not on any medications and has poor PO intake.       PAST HISTORY  --------------------------------------------------------------------------------  PAST MEDICAL & SURGICAL HISTORY:  HLD (hyperlipidemia)      No significant past surgical history        FAMILY HISTORY:    PAST SOCIAL HISTORY:    ALLERGIES & MEDICATIONS  --------------------------------------------------------------------------------  Allergies    No Known Allergies    Intolerances      Standing Inpatient Medications  fluconAZOLE   Tablet 150 milliGRAM(s) Oral once  heparin   Injectable 5000 Unit(s) SubCutaneous every 12 hours  piperacillin/tazobactam IVPB.. 3.375 Gram(s) IV Intermittent every 8 hours  potassium phosphate / sodium phosphate Powder (PHOS-NaK) 1 Packet(s) Oral every 6 hours  sodium chloride 3%. 500 milliLiter(s) IV Continuous <Continuous>  vancomycin  IVPB 500 milliGRAM(s) IV Intermittent every 24 hours    PRN Inpatient Medications  acetaminophen     Tablet .. 650 milliGRAM(s) Oral every 6 hours PRN  aluminum hydroxide/magnesium hydroxide/simethicone Suspension 30 milliLiter(s) Oral every 4 hours PRN  melatonin 3 milliGRAM(s) Oral at bedtime PRN  ondansetron Injectable 4 milliGRAM(s) IV Push every 8 hours PRN      REVIEW OF SYSTEMS  --------------------------------------------------------------------------------  Gen: No fevers/chills  Skin: No rashes  Head/Eyes/Ears: Normal hearing  Respiratory: No dyspnea, cough  CV: No chest pain  GI: No abdominal pain, diarrhea  : No dysuria, hematuria  MSK: No  edema  Heme: No easy bruising or bleeding  Psych: No significant depression    All other systems were reviewed and are negative, except as noted.    VITALS/PHYSICAL EXAM  --------------------------------------------------------------------------------  T(C): 37.2 (01-06-25 @ 15:30), Max: 39.2 (01-05-25 @ 17:13)  HR: 77 (01-06-25 @ 15:30) (66 - 103)  BP: 123/53 (01-06-25 @ 15:30) (120/58 - 160/70)  RR: 21 (01-06-25 @ 15:30) (16 - 21)  SpO2: 98% (01-06-25 @ 15:30) (97% - 100%)  Wt(kg): --  Height (cm): 165.1 (01-05-25 @ 17:13)  Weight (kg): 45.4 (01-05-25 @ 17:13)  BMI (kg/m2): 16.7 (01-05-25 @ 17:13)  BSA (m2): 1.47 (01-05-25 @ 17:13)      Physical Exam:  	Gen: NAD  	HEENT: MMM  	Pulm: CTA B/L  	CV: S1S2  	Abd: Soft, +BS   	Ext: No LE edema B/L  	Neuro: Awake  	Skin: Warm and dry  	Vascular access: Peripheral IV.     LABS/STUDIES  --------------------------------------------------------------------------------              10.9   1.70  >-----------<  172      [01-06-25 @ 08:26]              32.9     120  |  87  |  8   ----------------------------<  133      [01-06-25 @ 08:26]  3.6   |  19  |  0.84        Ca     8.7     [01-06-25 @ 08:26]      Mg     1.9     [01-06-25 @ 06:11]      Phos  1.9     [01-06-25 @ 06:11]    TPro  6.9  /  Alb  3.2  /  TBili  1.1  /  DBili  x   /  AST  57  /  ALT  39  /  AlkPhos  254  [01-06-25 @ 08:26]    PT/INR: PT 10.7 , INR 0.93       [01-05-25 @ 18:24]  PTT: 28.5       [01-05-25 @ 18:24]    Uric acid 2.1      [01-06-25 @ 08:26]        [01-06-25 @ 08:26]  Serum Osmolality 250      [01-06-25 @ 02:22]    Creatinine Trend:  SCr 0.84 [01-06 @ 08:26]  SCr 0.90 [01-05 @ 23:39]  SCr 0.98 [01-05 @ 18:24]    Urine Sodium 69      [01-06-25 @ 02:55]  Urine Osmolality 442      [01-05-25 @ 20:36]    TSH 2.93      [01-06-25 @ 06:11]    JOSELUIS: titer >1:2560, pattern DFS70      [07-10-22 @ 15:29]  Rheumatoid Factor 124      [07-10-22 @ 15:29]  ANCA: cANCA Negative, pANCA Negative, atypical ANCA Indeterminate Method interference due to JOSELUIS fluorescence      [07-10-22 @ 15:29]

## 2025-01-06 NOTE — PHYSICAL THERAPY INITIAL EVALUATION ADULT - GAIT DEVIATIONS NOTED, PT EVAL
decreased carey/increased time in double stance/decreased step length/decreased stride length/decreased weight-shifting ability

## 2025-01-06 NOTE — PROGRESS NOTE ADULT - PROBLEM SELECTOR PLAN 3
- outpatient gyn f/u Reported symptoms for several months. Haven't been evaluated outpatient    Plan  - Fluconazole 150mg x1  - outpatient gyn f/u

## 2025-01-06 NOTE — ED ADULT NURSE REASSESSMENT NOTE - NS ED NURSE REASSESS COMMENT FT1
Report received from HERBER Adam. Patient awake and alert, A&Ox4. Patient breathing spontaneously and unlabored. Patient awaiting inpatient bed assignment.

## 2025-01-06 NOTE — H&P ADULT - NSHPREVIEWOFSYSTEMS_GEN_ALL_CORE
REVIEW OF SYSTEMS:    CONSTITUTIONAL: fever & lethargy  EYES/ENT: No visual changes;  No vertigo or throat pain. Rhinorrhea, nasal congestion  NECK: No pain or stiffness  RESPIRATORY:  +cough, no wheezing, hemoptysis; No shortness of breath  CARDIOVASCULAR: No chest pain or palpitations  GASTROINTESTINAL: No abdominal or epigastric pain. No nausea, vomiting, or hematemesis; No diarrhea or constipation. No melena or hematochezia.  GENITOURINARY: No frequency or hematuria. Vaginal itching as above  NEUROLOGICAL: No numbness or weakness  SKIN: No itching, rashes

## 2025-01-06 NOTE — PHYSICAL THERAPY INITIAL EVALUATION ADULT - GENERAL OBSERVATIONS, REHAB EVAL
Rec'd semi-supine on stretcher in ED in NAD, VSS, IVL, +tele monitoring, son at b/s, agreeable to PT

## 2025-01-06 NOTE — PROGRESS NOTE ADULT - PROBLEM SELECTOR PLAN 2
- 122 on admission, down to 121 after 1.4L LR bolus  - likely SIADH vs poor intake (though poor intake was only over 2 days, and should at least improve somewhat with LR)  - check serum osm, urine osm, urine Na STAT  - would start hypertonic Na if studies c/w SIADH, appears asymptomatic for now  - nephro c/s in am  - q6 BMP on admission 122 --> 121 --> 120  Sosmo: 250, Uosmo: 442, Ankita: 69  - likely SIADH vs poor intake     Plan  Nephro consulted, f/u recs  - Fluid restriction 1L  - Trial hypertonic saline 2% 30cc/hr for 4hrs  - Trend cmp on admission 122 --> 121 --> 120  Sosmo: 250, Uosmo: 442, Ankita: 69  - likely SIADH vs poor intake     Plan  Appreciated Nephro recs  - Fluid restriction <1L  - Trial hypertonic saline 2% 30cc/hr for 6hrs - correction goal <6mEq/L a day.   - Trend cmp q12h

## 2025-01-06 NOTE — H&P ADULT - NSHPPHYSICALEXAM_GEN_ALL_CORE
ICU Vital Signs Last 24 Hrs  T(C): 37.2 (05 Jan 2025 23:37), Max: 39.2 (05 Jan 2025 17:13)  T(F): 98.9 (05 Jan 2025 23:37), Max: 102.6 (05 Jan 2025 17:13)  HR: 86 (05 Jan 2025 23:37) (85 - 103)  BP: 158/68 (05 Jan 2025 23:37) (145/57 - 158/68)  BP(mean): 91 (05 Jan 2025 23:37) (80 - 91)  ABP: --  ABP(mean): --  RR: 17 (05 Jan 2025 23:37) (16 - 20)  SpO2: 97% (05 Jan 2025 23:37) (97% - 98%)    O2 Parameters below as of 05 Jan 2025 23:37  Patient On (Oxygen Delivery Method): room air          GENERAL: mild acute distress  HEENT: atraumatic, normocephalic, vision grossly intact, EOMI, no conjunctivitis or discharge, hearing grossly intact, no nasal discharge or epistaxis, clear pharynx  CV: regular rate, normal rhythm, normal S1/S2, no murmurs/rubs, no cyanosis  PULM: occasional inspiratory stridor on exam, normal work of breathing, normal O2 saturation on RA  GI: soft/non-tender/nondistended abdomen, no guarding or rebound tenderness, no palpable masses  : no CVA tenderness  NEURO: A&Ox3, follows commands, normal speech, no focal motor or sensory deficits  MSK: no joint tenderness/swelling/erythema, ranging all extremities with no appreciable loss of ROM  EXT: no peripheral edema, no calf tenderness, no redness or swelling  SKIN: warm, dry, and intact, no rashes

## 2025-01-06 NOTE — PHYSICAL THERAPY INITIAL EVALUATION ADULT - LEVEL OF INDEPENDENCE: GAIT, REHAB EVAL
Assessment/Plan:   successful Kyleena placement   instructions given   return to the office in 1 month       Diagnoses and all orders for this visit:    Encounter for IUD insertion  -     levonorgestrel (KYLEENA) 19 5 mg intrauterine device (IUD)    Acute deep vein thrombosis (DVT) of axillary vein of right upper extremity ([facility])    Current use of long term anticoagulation              Subjective:        Patient ID: Valentin Nolen is a 33 y o  female  Dr Jignesh Peña for returns for [facility] placement  Her last menstrual period was December 31st   She has not had intercourse for the past 10 days  A negative pregnancy test was obtained today  The following portions of the patient's history were reviewed and updated as appropriate: She  has a past medical history of DVT (deep venous thrombosis) ([facility]) (02/08/2021)  Patient Active Problem List    Diagnosis Date Noted    Acute deep vein thrombosis (DVT) of axillary vein of right upper extremity ([facility]) 03/26/2021    Viral infection, unspecified 11/24/2020    Exposure to COVID-19 virus 11/24/2020    Fatigue 07/22/2020    Acne vulgaris 07/22/2020    Hyperpigmentation of skin 07/22/2020    Annual physical exam 10/30/2018     She  has no past surgical history on file  Her family history includes Benign prostatic hyperplasia in her father; Endometriosis in her sister; Hypertension in her mother; No Known Problems in her brother and sister; Stroke in her maternal grandmother  She  reports that she has never smoked  She has never used smokeless tobacco  She reports current alcohol use  She reports that she does not use drugs    Current Outpatient Medications   Medication Sig Dispense Refill    acetaminophen-codeine (TYLENOL #2) 300-15 MG per tablet Take 1 tablet by mouth every 4 (four) hours as needed for moderate pain 30 tablet 0    clindamycin (CLINDAGEL) 1 % gel Apply topically 2 (two) times a day 30 g 3    cyclobenzaprine (FLEXERIL) 10 mg tablet Take 1 tablet (10 mg total) by mouth 3 (three) times a day as needed for muscle spasms 60 tablet 0    warfarin (COUMADIN) 1 mg tablet Take one tablet daily 60 tablet 11    warfarin (COUMADIN) 5 mg tablet Take 1 tablet daily 30 tablet 11     No current facility-administered medications for this visit  Current Outpatient Medications on File Prior to Visit   Medication Sig    acetaminophen-codeine (TYLENOL #2) 300-15 MG per tablet Take 1 tablet by mouth every 4 (four) hours as needed for moderate pain    clindamycin (CLINDAGEL) 1 % gel Apply topically 2 (two) times a day    cyclobenzaprine (FLEXERIL) 10 mg tablet Take 1 tablet (10 mg total) by mouth 3 (three) times a day as needed for muscle spasms    warfarin (COUMADIN) 1 mg tablet Take one tablet daily    warfarin (COUMADIN) 5 mg tablet Take 1 tablet daily     No current facility-administered medications on file prior to visit  She has No Known Allergies       Review of Systems   Constitutional: Negative  Objective: There were no vitals filed for this visit  Physical Exam  Constitutional:       Appearance: Normal appearance  Genitourinary:     General: Normal vulva  Comments:  Moderate cervical eversion  Neurological:      Mental Status: She is alert and oriented to person, place, and time  Psychiatric:         Mood and Affect: Mood normal          Behavior: Behavior normal          Thought Content: Thought content normal          Judgment: Judgment normal            Iud insertions    Date/Time: 4/20/2021 10:54 AM  Performed by: Brad Brown MD  Authorized by: Brad Brown MD   Universal Protocol:  Consent: Verbal consent obtained  Written consent obtained    Risks and benefits: risks, benefits and alternatives were discussed  Consent given by: patient  Patient understanding: patient states understanding of the procedure being performed  Patient consent: the patient's understanding of the procedure matches consent given  Procedure consent: procedure consent matches procedure scheduled  Relevant documents: relevant documents present and verified  Patient identity confirmed: verbally with patient        Procedure:     Pelvic exam performed: no      Negative GC/chlamydia test: no      Negative urine pregnancy test: yes      Cervix cleaned and prepped: yes      Speculum placed in vagina: yes      Tenaculum applied to cervix: yes      Uterus sounded: yes      Uterus sound depth (cm):  7    IUD inserted with no complications: yes      IUD type: Kerry Calender  Strings trimmed: yes    Post-procedure:     Patient tolerated procedure well: yes      Patient will follow up after next period: yes    Comments: The uterus is anteverted and on the smaller side  There was difficulty initially sounding  With traction placed on the tenaculum sounding was performed in an uncomplicated manner  Cervix was then dilated using a curved dilator up to 4 mm  contact guard

## 2025-01-06 NOTE — PROGRESS NOTE ADULT - SUBJECTIVE AND OBJECTIVE BOX
Patient is a 79y old  Female who presents with a chief complaint of Sepsis (06 Jan 2025 01:35)      SUBJECTIVE / OVERNIGHT EVENTS:  No acute event overnight    Pt seen and examined at bedside. Had no complaints. Denied cp, sob, d/n/v    MEDICATIONS  (STANDING):  heparin   Injectable 5000 Unit(s) SubCutaneous every 12 hours  piperacillin/tazobactam IVPB.. 3.375 Gram(s) IV Intermittent every 8 hours  vancomycin  IVPB 500 milliGRAM(s) IV Intermittent every 24 hours    MEDICATIONS  (PRN):  acetaminophen     Tablet .. 650 milliGRAM(s) Oral every 6 hours PRN Temp greater or equal to 38C (100.4F), Mild Pain (1 - 3)  aluminum hydroxide/magnesium hydroxide/simethicone Suspension 30 milliLiter(s) Oral every 4 hours PRN Dyspepsia  melatonin 3 milliGRAM(s) Oral at bedtime PRN Insomnia  ondansetron Injectable 4 milliGRAM(s) IV Push every 8 hours PRN Nausea and/or Vomiting      CAPILLARY BLOOD GLUCOSE        I&O's Summary      Vital Signs Last 24 Hrs  T(C): 37.1 (06 Jan 2025 05:55), Max: 39.2 (05 Jan 2025 17:13)  T(F): 98.7 (06 Jan 2025 05:55), Max: 102.6 (05 Jan 2025 17:13)  HR: 73 (06 Jan 2025 05:55) (73 - 103)  BP: 129/60 (06 Jan 2025 05:55) (129/60 - 160/70)  BP(mean): 81 (06 Jan 2025 05:55) (80 - 95)  RR: 17 (06 Jan 2025 05:55) (16 - 20)  SpO2: 97% (06 Jan 2025 05:55) (97% - 98%)    Parameters below as of 06 Jan 2025 05:55  Patient On (Oxygen Delivery Method): room air        Physical Exam  CONSTITUTIONAL: NAD  EYES: EOMI, conjunctiva and sclera clear  ENMT: Moist oral mucosa  NECK: Supple  RESPIRATORY: Breathing unlabored, CTAB  CARDIOVASCULAR: S1S2 no MRG  ABDOMEN: Nontender to palpation, normoactive bowel sounds, no rebound/guarding  MUSCULOSKELETAL: no clubbing or cyanosis of digits  NEUROLOGY: No focal deficits   SKIN: No rashes or lesions    LABS:                        12.8   1.05  )-----------( 227      ( 05 Jan 2025 18:24 )             38.3      01-05    121[L]  |  89[L]  |  9   ----------------------------<  154[H]  4.0   |  21[L]  |  0.90    Ca    9.1      05 Jan 2025 23:39  Phos  1.9     01-06  Mg     1.9     01-06    TPro  8.4[H]  /  Alb  3.7  /  TBili  1.2  /  DBili  x   /  AST  46[H]  /  ALT  38  /  AlkPhos  280[H]  01-05    PT/INR - ( 05 Jan 2025 18:24 )   PT: 10.7 sec;   INR: 0.93 ratio         PTT - ( 05 Jan 2025 18:24 )  PTT:28.5 sec      Urinalysis Basic - ( 05 Jan 2025 23:39 )    Color: x / Appearance: x / SG: x / pH: x  Gluc: 154 mg/dL / Ketone: x  / Bili: x / Urobili: x   Blood: x / Protein: x / Nitrite: x   Leuk Esterase: x / RBC: x / WBC x   Sq Epi: x / Non Sq Epi: x / Bacteria: x        RADIOLOGY & ADDITIONAL TESTS:    Imaging Personally Reviewed:    Consultant(s) Notes Reviewed:      Care Discussed with Consultants/Other Providers:   Patient is a 79y old  Female who presents with a chief complaint of Sepsis (06 Jan 2025 01:35)      SUBJECTIVE / OVERNIGHT EVENTS:  No acute event overnight    Pt seen and examined at bedside. Son at bedside acted as . Patient reported feeling better and had no pain. Endorsed vaginal itch for several months. Denied chill, cp, abd pain, sob, d/n/v    MEDICATIONS  (STANDING):  heparin   Injectable 5000 Unit(s) SubCutaneous every 12 hours  piperacillin/tazobactam IVPB.. 3.375 Gram(s) IV Intermittent every 8 hours  vancomycin  IVPB 500 milliGRAM(s) IV Intermittent every 24 hours    MEDICATIONS  (PRN):  acetaminophen     Tablet .. 650 milliGRAM(s) Oral every 6 hours PRN Temp greater or equal to 38C (100.4F), Mild Pain (1 - 3)  aluminum hydroxide/magnesium hydroxide/simethicone Suspension 30 milliLiter(s) Oral every 4 hours PRN Dyspepsia  melatonin 3 milliGRAM(s) Oral at bedtime PRN Insomnia  ondansetron Injectable 4 milliGRAM(s) IV Push every 8 hours PRN Nausea and/or Vomiting      CAPILLARY BLOOD GLUCOSE        I&O's Summary      Vital Signs Last 24 Hrs  T(C): 37.1 (06 Jan 2025 05:55), Max: 39.2 (05 Jan 2025 17:13)  T(F): 98.7 (06 Jan 2025 05:55), Max: 102.6 (05 Jan 2025 17:13)  HR: 73 (06 Jan 2025 05:55) (73 - 103)  BP: 129/60 (06 Jan 2025 05:55) (129/60 - 160/70)  BP(mean): 81 (06 Jan 2025 05:55) (80 - 95)  RR: 17 (06 Jan 2025 05:55) (16 - 20)  SpO2: 97% (06 Jan 2025 05:55) (97% - 98%)    Parameters below as of 06 Jan 2025 05:55  Patient On (Oxygen Delivery Method): room air        Physical Exam  CONSTITUTIONAL: NAD  EYES: EOMI, conjunctiva and sclera clear  ENMT: Moist oral mucosa  NECK: Supple  RESPIRATORY: Breathing unlabored on room air. Diminished breath sounds in lower lobes b/l. Mild rhonchi appreciated b/l  CARDIOVASCULAR: S1S2 no MRG  ABDOMEN: Nontender to palpation, normoactive bowel sounds, no rebound/guarding  MUSCULOSKELETAL: no clubbing or cyanosis of digits. No edema in LEs  NEUROLOGY: No focal deficits. Moving all extremities.  SKIN: No rashes or lesions    LABS:                        12.8   1.05  )-----------( 227      ( 05 Jan 2025 18:24 )             38.3      01-05    121[L]  |  89[L]  |  9   ----------------------------<  154[H]  4.0   |  21[L]  |  0.90    Ca    9.1      05 Jan 2025 23:39  Phos  1.9     01-06  Mg     1.9     01-06    TPro  8.4[H]  /  Alb  3.7  /  TBili  1.2  /  DBili  x   /  AST  46[H]  /  ALT  38  /  AlkPhos  280[H]  01-05    PT/INR - ( 05 Jan 2025 18:24 )   PT: 10.7 sec;   INR: 0.93 ratio         PTT - ( 05 Jan 2025 18:24 )  PTT:28.5 sec      Urinalysis Basic - ( 05 Jan 2025 23:39 )    Color: x / Appearance: x / SG: x / pH: x  Gluc: 154 mg/dL / Ketone: x  / Bili: x / Urobili: x   Blood: x / Protein: x / Nitrite: x   Leuk Esterase: x / RBC: x / WBC x   Sq Epi: x / Non Sq Epi: x / Bacteria: x        RADIOLOGY & ADDITIONAL TESTS:    Imaging Personally Reviewed:    Consultant(s) Notes Reviewed:      Care Discussed with Consultants/Other Providers:

## 2025-01-06 NOTE — CONSULT NOTE ADULT - ASSESSMENT
79F with PMH hypothyroidism, RA, and HTN with previous episode of neutropenia in 2022 presenting with fever and URI symptoms found to have enterorhinovirus. Heme consulted for severe neutropenia ( on admission now 0).     Recommendations:   - Will obtain flow cytometry and assess peripheral blood smear  - Bone marrow biopsy for tomorrow  - Please consult rheumatology to rule out autoimmune causes given previous positive RF and elevated JOSELUIS  - Will evaluate for any medications that may cause neutropenia   79F with PMH hypothyroidism, RA, and HTN with previous episode of neutropenia in 2022 presenting with fever and URI symptoms found to have enterorhinovirus. Heme consulted for severe neutropenia ( on admission now 0). Patient had episodes of severe neutropenia in the past (ANC 30) with normal flow cytometry but no bone marrow biopsy performed. Patient does not take any medications to explain neutropenia.    Recommendations:   - Will obtain flow cytometry and assess peripheral blood smear  - Bone marrow biopsy for tomorrow  - Please consult rheumatology to rule out autoimmune causes given previous positive RF and elevated JOSELUIS   79F with PMH hypothyroidism, RA, and HTN with previous episode of neutropenia in 2022 presenting with fever and URI symptoms found to have enterorhinovirus. Heme consulted for severe neutropenia ( on admission now 0). Patient had episodes of severe neutropenia in the past (ANC 30) with normal flow cytometry but no bone marrow biopsy performed. Patient does not take any medications to explain neutropenia.    Recommendations:   - Will obtain flow cytometry and assess peripheral blood smear  - If peripheral smear is abnormal will consider BMB.   - Please consult rheumatology to rule out autoimmune causes given previous positive RF and elevated JOSELUIS

## 2025-01-06 NOTE — H&P ADULT - HISTORY OF PRESENT ILLNESS
79F reportedly no medical hx, presents for 2 days of nasal congestion, clear rhinorrhea, cough, fever, a/w worsening lethargy and poor PO intake. Patient recently travelled to [facility], returned on 12/30. No known sick contacts in [place] or [place]. No headache, SOB, CP, sputum production, abd pain, urinary frequency/incontinence, changes to bowel habits. No leg pain/swelling. Patient does report dysuria and vaginal itching for 6 months, which is unchanged with this acute episode. She denies vaginal discharge, bleeding, or drainage. She has not sought medical care for this. She takes no medications chronically, she has tried aspirin while ill for fever. Notably, her prior med list does include levothyroxine.    Notably, in 2022, patient was hospitalized with rash 2/2 cellulitis vs. low likelihood vasculitis, and had neutropenia at that time, associated with positive JOSELUIS and rheumatoid factor. 79F reportedly no medical hx, per chart review has hx of hypothyroidism, RA, HTN, presents for 2 days of nasal congestion, clear rhinorrhea, cough, fever, a/w worsening lethargy and poor PO intake. Patient recently travelled to [facility], returned on 12/30. No known sick contacts in [facility] or [place]. No headache, SOB, CP, sputum production, abd pain, urinary frequency/incontinence, changes to bowel habits. No leg pain/swelling. Patient does report dysuria and vaginal itching for 6 months, which is unchanged with this acute episode. She denies vaginal discharge, bleeding, or drainage. She has not sought medical care for this. She takes no medications chronically, she has tried aspirin while ill for fever. Notably, her prior med list does include levothyroxine.    Notably, in 2022, patient was hospitalized with rash 2/2 cellulitis vs. low likelihood vasculitis, and had neutropenia at that time, associated with positive JOSELUIS and rheumatoid factor.

## 2025-01-06 NOTE — PHYSICAL THERAPY INITIAL EVALUATION ADULT - NSPTDMEREC_GEN_A_CORE
Pt will require a rolling walker at home due to their Dx of   impaired balance 2/2 generalized weakness   to help complete MRADL's/rolling walker

## 2025-01-06 NOTE — H&P ADULT - TIME BILLING
reviewing prior documentation, independently obtaining a history, performing a physical examination, reviewing and independently interpreting tests/imaging, discussing the plan with the patient/son, counseling and educating the patient/son, documenting clinical information in the electronic health record, and coordinating care with staff. The time spent on encounter excludes teaching and separately reported services.

## 2025-01-06 NOTE — H&P ADULT - PROBLEM SELECTOR PLAN 3
DVT: heparin  Diet: easy to chew, patient typically has soups and similar to eat at home  Dispo: pending PT - outpatient gyn f/u - possibly atrophic vaginitis  - outpatient gyn f/u for further eval and consideration of estrogen therapy

## 2025-01-06 NOTE — PATIENT PROFILE ADULT - NSPROIMPLANTSMEDDEV_GEN_A_NUR
Chief Complaint   Patient presents with   • Ear Pain     right, trouble hearing , started yesterday states that whem he pulls on his ear  he can hear but other wise cant hear    • Blood Pressure     check        HISTORY OF PRESENT ILLNESS:  Patient presents the office complaining of some pain and plugging in the right ear over the last couple days. He states over last few days he has put a little Debrox in the ear and has cleared a little bit but he is still experiencing some pain and discomfort. He has had a runny nose and nasal congestion recently which seems to be resolving. He denies fever chills and sweats although he has had a little bit of a diminished hearing acuity.    Past Medical History:   Diagnosis Date   • Bladder Cancer 01/01/1980's   • BPH    • Cervical disc disorder with radiculopathy 9/23/2015   • DJD (degenerative joint disease), cervical    • Encounter for colonoscopy due to history of adenomatous colonic polyps 8/13/13    repeat due 2016   • Erectile dysfunction    • Hypertension    • Lumbar disc disease with radiculopathy 9/23/2015   • PONV (postoperative nausea and vomiting)      Outpatient Prescriptions Marked as Taking for the 8/8/18 encounter (Office Visit) with XENA Tejeda   Medication Sig Dispense Refill   • lisinopril (PRINIVIL,ZESTRIL) 40 MG tablet TAKE ONE TABLET BY MOUTH DAILY 90 tablet 1   • naproxen (ALEVE) 220 MG capsule Take 220 mg by mouth at bedtime.     • aspirin 81 MG tablet Take 81 mg by mouth daily.       ALLERGIES:   Allergen Reactions   • Baclofen DIZZINESS     Caused significant dizziness resulting in a fall at home   • Latex HIVES       Visit Vitals  /82 (BP Location: [facility], Patient Position: Sitting, Cuff Size: Large Adult)   Pulse 78   Temp 98.2 °F (36.8 °C) (Tympanic)   Resp 16   Ht 5' 11\" (1.803 m)   Wt 92.3 kg   SpO2 98%   BMI 28.37 kg/m²     GENERAL:  alert, active, comfortable, not toxic, and in no acute distress  HYDRATION:  well hydrated: moist  mm's, good tear production & skin turgor, eyes not sunken  SKIN:  Normal, no significant lesions or rashes  EYES:  Sclerae and conjunctivae clear, SUYAPA, lids and adnexae normal  EARS: Left tympanic membrane normal canal normal, right ear canal swollen, erythematous with some scant cerumen, right tympanic membrane dull, moderate erythema slight middle ear fluid  NOSE:  Nl. mucosa, nares patent, septum nl.  No drainage or sinus tenderness.  THROAT:  no significant tonsillar hypertrophy or inflam., or oropharyngeal lesions  NECK:  supple and no cervical or supraclavicular lymphadenopathy  LUNGS:  Chest totally clear; no rales, rhonchi, wheezes or stridor, Excellent air exchange with normal I/E; and no tachyp or retractions  CARDIAC:regular rhythm, rate of 76, no clicks heard, no gallop sounds, no significant murmur, heart tones are normal in intensity and no rub heard  ABDOMEN:  Nondist., soft, nl bowel sounds, nontender, no masses or H/S -reynoldly      Tanner was seen today for ear pain and blood pressure.    Diagnoses and all orders for this visit:    Essential hypertension  Comments:  controlled    Acute swimmer's ear of right side  -     ofloxacin (FLOXIN) 0.3 % otic solution; Place 5 drops into left ear 2 times daily.    Right otitis media, unspecified otitis media type  -     amoxicillin (AMOXIL) 875 MG tablet; Take 1 tablet by mouth 2 times daily.      EDUCATION: I discussed the diagnoses and recommendations with the patient. The patient expressed understanding and is in agreement with the plan.      Follow up 7-10 days if not improving, sooner if worse. To ER (Emergency Room) if symptoms worsen after Clinic hours.  XENA Tejeda Dr., M.D.     None

## 2025-01-06 NOTE — H&P ADULT - PROBLEM SELECTOR PLAN 4
DVT: heparin  Diet: easy to chew, patient typically has soups and similar to eat at home  Dispo: pending PT

## 2025-01-06 NOTE — ED ADULT NURSE REASSESSMENT NOTE - NS ED NURSE REASSESS COMMENT FT1
Pt made aware she needs to provide urine sample. Pt states she does not need to use bathroom at this time and will let staff know when ready. Comfort and safety provided, bed in lowest position, side rails up, and call bell within reach.

## 2025-01-06 NOTE — CONSULT NOTE ADULT - ASSESSMENT
80 y/o F reportedly no medical hx, per chart review has hx of hypothyroidism, RA, HTN, presents for 2 days of nasal congestion, clear rhinorrhea, cough, fever, a/w worsening lethargy and poor PO intake. Patient recently travelled to [facility], returned on 12/30. Now patient has neutropenic ?sepsis.     SNa was 122 at the time of admission. Pt received IV fluids and it improved to 120. Serum osmolality 250. Urine osm 442. Urine Na 55. Pt is not on any medications and has poor PO intake.     Hypo-osmolar hyponatremia:  Likely SIADH.   Give 2% saline 40cc/hr for 6 hours and repeat Urine osm, Urine Na and BMP. BMP q12 for now.   Oral fluid restriction to <1L a day.   Encourage PO intake of food as osmolar load would improve the serum sodium.   Change all drips to NS instead of D5.   Avoid rapid correction and goal correction is <6mEq/L a day.     Discussed with attending Dr. Guo

## 2025-01-07 ENCOUNTER — RESULT REVIEW (OUTPATIENT)
Age: 80
End: 2025-01-07

## 2025-01-07 LAB
A1C WITH ESTIMATED AVERAGE GLUCOSE RESULT: 5.7 % — HIGH (ref 4–5.6)
ADD ON TEST-SPECIMEN IN LAB: SIGNIFICANT CHANGE UP
ALBUMIN SERPL ELPH-MCNC: 2.8 G/DL — LOW (ref 3.3–5)
ALP SERPL-CCNC: 220 U/L — HIGH (ref 40–120)
ALT FLD-CCNC: 36 U/L — SIGNIFICANT CHANGE UP (ref 10–45)
ANION GAP SERPL CALC-SCNC: 12 MMOL/L — SIGNIFICANT CHANGE UP (ref 5–17)
ANION GAP SERPL CALC-SCNC: 16 MMOL/L — SIGNIFICANT CHANGE UP (ref 5–17)
AST SERPL-CCNC: 41 U/L — HIGH (ref 10–40)
BASOPHILS # BLD AUTO: 0.02 K/UL — SIGNIFICANT CHANGE UP (ref 0–0.2)
BASOPHILS NFR BLD AUTO: 0.9 % — SIGNIFICANT CHANGE UP (ref 0–2)
BILIRUB SERPL-MCNC: 0.4 MG/DL — SIGNIFICANT CHANGE UP (ref 0.2–1.2)
BUN SERPL-MCNC: 13 MG/DL — SIGNIFICANT CHANGE UP (ref 7–23)
BUN SERPL-MCNC: 13 MG/DL — SIGNIFICANT CHANGE UP (ref 7–23)
BURR CELLS BLD QL SMEAR: PRESENT — SIGNIFICANT CHANGE UP
CALCIUM SERPL-MCNC: 8.2 MG/DL — LOW (ref 8.4–10.5)
CALCIUM SERPL-MCNC: 8.4 MG/DL — SIGNIFICANT CHANGE UP (ref 8.4–10.5)
CHLORIDE SERPL-SCNC: 101 MMOL/L — SIGNIFICANT CHANGE UP (ref 96–108)
CHLORIDE SERPL-SCNC: 94 MMOL/L — LOW (ref 96–108)
CHOLEST SERPL-MCNC: 198 MG/DL — SIGNIFICANT CHANGE UP
CMV IGG FLD QL: 2.3 U/ML — HIGH
CMV IGG SERPL-IMP: POSITIVE
CMV IGM FLD-ACNC: <8 AU/ML — SIGNIFICANT CHANGE UP
CMV IGM SERPL QL: NEGATIVE — SIGNIFICANT CHANGE UP
CO2 SERPL-SCNC: 19 MMOL/L — LOW (ref 22–31)
CO2 SERPL-SCNC: 19 MMOL/L — LOW (ref 22–31)
CREAT SERPL-MCNC: 1 MG/DL — SIGNIFICANT CHANGE UP (ref 0.5–1.3)
CREAT SERPL-MCNC: 1.02 MG/DL — SIGNIFICANT CHANGE UP (ref 0.5–1.3)
DACRYOCYTES BLD QL SMEAR: SLIGHT — SIGNIFICANT CHANGE UP
EBV EA AB SER IA-ACNC: 18.7 U/ML — HIGH
EBV EA AB TITR SER IF: POSITIVE
EBV EA IGG SER-ACNC: POSITIVE
EBV NA IGG SER IA-ACNC: 89.6 U/ML — HIGH
EBV PATRN SPEC IB-IMP: SIGNIFICANT CHANGE UP
EBV VCA IGG AVIDITY SER QL IA: POSITIVE
EBV VCA IGM SER IA-ACNC: 450 U/ML — HIGH
EBV VCA IGM SER IA-ACNC: <10 U/ML — SIGNIFICANT CHANGE UP
EBV VCA IGM TITR FLD: NEGATIVE — SIGNIFICANT CHANGE UP
EGFR: 56 ML/MIN/1.73M2 — LOW
EGFR: 57 ML/MIN/1.73M2 — LOW
ELLIPTOCYTES BLD QL SMEAR: SLIGHT — SIGNIFICANT CHANGE UP
EOSINOPHIL # BLD AUTO: 0 K/UL — SIGNIFICANT CHANGE UP (ref 0–0.5)
EOSINOPHIL NFR BLD AUTO: 0 % — SIGNIFICANT CHANGE UP (ref 0–6)
ESTIMATED AVERAGE GLUCOSE: 117 MG/DL — HIGH (ref 68–114)
GIANT PLATELETS BLD QL SMEAR: PRESENT — SIGNIFICANT CHANGE UP
GLUCOSE SERPL-MCNC: 122 MG/DL — HIGH (ref 70–99)
GLUCOSE SERPL-MCNC: 264 MG/DL — HIGH (ref 70–99)
HAPTOGLOB SERPL-MCNC: 157 MG/DL — SIGNIFICANT CHANGE UP (ref 34–200)
HAV IGM SER-ACNC: SIGNIFICANT CHANGE UP
HBV CORE IGM SER-ACNC: SIGNIFICANT CHANGE UP
HBV SURFACE AG SER-ACNC: SIGNIFICANT CHANGE UP
HCT VFR BLD CALC: 31.2 % — LOW (ref 34.5–45)
HCV AB S/CO SERPL IA: 0.5 S/CO — SIGNIFICANT CHANGE UP (ref 0–0.99)
HCV AB SERPL-IMP: SIGNIFICANT CHANGE UP
HDLC SERPL-MCNC: 59 MG/DL — SIGNIFICANT CHANGE UP
HGB BLD-MCNC: 10.4 G/DL — LOW (ref 11.5–15.5)
IRON SATN MFR SERPL: 16 UG/DL — LOW (ref 30–160)
IRON SATN MFR SERPL: 7 % — LOW (ref 14–50)
LIPID PNL WITH DIRECT LDL SERPL: 117 MG/DL — HIGH
LYMPHOCYTES # BLD AUTO: 0.97 K/UL — LOW (ref 1–3.3)
LYMPHOCYTES # BLD AUTO: 56.9 % — HIGH (ref 13–44)
MAGNESIUM SERPL-MCNC: 2.1 MG/DL — SIGNIFICANT CHANGE UP (ref 1.6–2.6)
MANUAL SMEAR VERIFICATION: SIGNIFICANT CHANGE UP
MCHC RBC-ENTMCNC: 27.6 PG — SIGNIFICANT CHANGE UP (ref 27–34)
MCHC RBC-ENTMCNC: 33.3 G/DL — SIGNIFICANT CHANGE UP (ref 32–36)
MCV RBC AUTO: 82.8 FL — SIGNIFICANT CHANGE UP (ref 80–100)
MONOCYTES # BLD AUTO: 0.7 K/UL — SIGNIFICANT CHANGE UP (ref 0–0.9)
MONOCYTES NFR BLD AUTO: 41.4 % — HIGH (ref 2–14)
NEUTROPHILS # BLD AUTO: 0.01 K/UL — LOW (ref 1.8–7.4)
NEUTROPHILS NFR BLD AUTO: 0.8 % — LOW (ref 43–77)
NON HDL CHOLESTEROL: 140 MG/DL — HIGH
PHOSPHATE SERPL-MCNC: 2.1 MG/DL — LOW (ref 2.5–4.5)
PLAT MORPH BLD: ABNORMAL
PLATELET # BLD AUTO: 195 K/UL — SIGNIFICANT CHANGE UP (ref 150–400)
POIKILOCYTOSIS BLD QL AUTO: SIGNIFICANT CHANGE UP
POTASSIUM SERPL-MCNC: 3.4 MMOL/L — LOW (ref 3.5–5.3)
POTASSIUM SERPL-MCNC: 4.3 MMOL/L — SIGNIFICANT CHANGE UP (ref 3.5–5.3)
POTASSIUM SERPL-SCNC: 3.4 MMOL/L — LOW (ref 3.5–5.3)
POTASSIUM SERPL-SCNC: 4.3 MMOL/L — SIGNIFICANT CHANGE UP (ref 3.5–5.3)
PROT SERPL-MCNC: 6.7 G/DL — SIGNIFICANT CHANGE UP (ref 6–8.3)
RBC # BLD: 3.77 M/UL — LOW (ref 3.8–5.2)
RBC # FLD: 13.7 % — SIGNIFICANT CHANGE UP (ref 10.3–14.5)
RBC BLD AUTO: ABNORMAL
SODIUM SERPL-SCNC: 129 MMOL/L — LOW (ref 135–145)
SODIUM SERPL-SCNC: 132 MMOL/L — LOW (ref 135–145)
TIBC SERPL-MCNC: 229 UG/DL — SIGNIFICANT CHANGE UP (ref 220–430)
TRIGL SERPL-MCNC: 128 MG/DL — SIGNIFICANT CHANGE UP
UIBC SERPL-MCNC: 213 UG/DL — SIGNIFICANT CHANGE UP (ref 110–370)
WBC # BLD: 1.7 K/UL — LOW (ref 3.8–10.5)
WBC # FLD AUTO: 1.7 K/UL — LOW (ref 3.8–10.5)

## 2025-01-07 PROCEDURE — 99223 1ST HOSP IP/OBS HIGH 75: CPT | Mod: GC

## 2025-01-07 PROCEDURE — 88305 TISSUE EXAM BY PATHOLOGIST: CPT | Mod: 26

## 2025-01-07 PROCEDURE — G0452: CPT | Mod: 26

## 2025-01-07 PROCEDURE — 85097 BONE MARROW INTERPRETATION: CPT

## 2025-01-07 PROCEDURE — G0452: CPT | Mod: 26,XP

## 2025-01-07 PROCEDURE — 99222 1ST HOSP IP/OBS MODERATE 55: CPT

## 2025-01-07 PROCEDURE — 76705 ECHO EXAM OF ABDOMEN: CPT | Mod: 26

## 2025-01-07 PROCEDURE — 88189 FLOWCYTOMETRY/READ 16 & >: CPT | Mod: 59

## 2025-01-07 PROCEDURE — 88342 IMHCHEM/IMCYTCHM 1ST ANTB: CPT | Mod: 26,59

## 2025-01-07 PROCEDURE — 99232 SBSQ HOSP IP/OBS MODERATE 35: CPT | Mod: GC

## 2025-01-07 PROCEDURE — 88360 TUMOR IMMUNOHISTOCHEM/MANUAL: CPT | Mod: 26

## 2025-01-07 PROCEDURE — 88313 SPECIAL STAINS GROUP 2: CPT | Mod: 26

## 2025-01-07 PROCEDURE — 88108 CYTOPATH CONCENTRATE TECH: CPT | Mod: 26,59

## 2025-01-07 PROCEDURE — 88341 IMHCHEM/IMCYTCHM EA ADD ANTB: CPT | Mod: 26,59

## 2025-01-07 RX ORDER — HEPARIN SODIUM 1000 [USP'U]/ML
5000 INJECTION, SOLUTION INTRAVENOUS; SUBCUTANEOUS EVERY 12 HOURS
Refills: 0 | Status: DISCONTINUED | OUTPATIENT
Start: 2025-01-07 | End: 2025-01-14

## 2025-01-07 RX ORDER — POTASSIUM PHOSPHATE, MONOBASIC AND POTASSIUM PHOSPHATE, DIBASIC 224; 236 MG/ML; MG/ML
15 INJECTION, SOLUTION INTRAVENOUS ONCE
Refills: 0 | Status: COMPLETED | OUTPATIENT
Start: 2025-01-07 | End: 2025-01-07

## 2025-01-07 RX ORDER — SODIUM CHLORIDE 9 MG/ML
1000 INJECTION, SOLUTION INTRAVENOUS
Refills: 0 | Status: DISCONTINUED | OUTPATIENT
Start: 2025-01-07 | End: 2025-01-08

## 2025-01-07 RX ORDER — HYDROMORPHONE HCL 4 MG
0.2 TABLET ORAL ONCE
Refills: 0 | Status: DISCONTINUED | OUTPATIENT
Start: 2025-01-07 | End: 2025-01-07

## 2025-01-07 RX ORDER — POTASSIUM CHLORIDE 600 MG/1
20 TABLET, FILM COATED, EXTENDED RELEASE ORAL
Refills: 0 | Status: COMPLETED | OUTPATIENT
Start: 2025-01-07 | End: 2025-01-07

## 2025-01-07 RX ADMIN — PIPERACILLIN AND TAZOBACTAM 25 GRAM(S): 3; .375 INJECTION, POWDER, LYOPHILIZED, FOR SOLUTION INTRAVENOUS at 05:32

## 2025-01-07 RX ADMIN — HEPARIN SODIUM 5000 UNIT(S): 1000 INJECTION, SOLUTION INTRAVENOUS; SUBCUTANEOUS at 17:07

## 2025-01-07 RX ADMIN — POTASSIUM PHOSPHATE, MONOBASIC AND POTASSIUM PHOSPHATE, DIBASIC 62.5 MILLIMOLE(S): 224; 236 INJECTION, SOLUTION INTRAVENOUS at 08:58

## 2025-01-07 RX ADMIN — POTASSIUM CHLORIDE 20 MILLIEQUIVALENT(S): 600 TABLET, FILM COATED, EXTENDED RELEASE ORAL at 11:22

## 2025-01-07 RX ADMIN — Medication 0.2 MILLIGRAM(S): at 16:39

## 2025-01-07 RX ADMIN — Medication 0.2 MILLIGRAM(S): at 15:31

## 2025-01-07 RX ADMIN — PIPERACILLIN AND TAZOBACTAM 25 GRAM(S): 3; .375 INJECTION, POWDER, LYOPHILIZED, FOR SOLUTION INTRAVENOUS at 21:38

## 2025-01-07 RX ADMIN — HEPARIN SODIUM 5000 UNIT(S): 1000 INJECTION, SOLUTION INTRAVENOUS; SUBCUTANEOUS at 05:32

## 2025-01-07 RX ADMIN — PIPERACILLIN AND TAZOBACTAM 25 GRAM(S): 3; .375 INJECTION, POWDER, LYOPHILIZED, FOR SOLUTION INTRAVENOUS at 00:12

## 2025-01-07 RX ADMIN — POTASSIUM CHLORIDE 20 MILLIEQUIVALENT(S): 600 TABLET, FILM COATED, EXTENDED RELEASE ORAL at 09:26

## 2025-01-07 RX ADMIN — PIPERACILLIN AND TAZOBACTAM 25 GRAM(S): 3; .375 INJECTION, POWDER, LYOPHILIZED, FOR SOLUTION INTRAVENOUS at 13:12

## 2025-01-07 NOTE — CONSULT NOTE ADULT - SUBJECTIVE AND OBJECTIVE BOX
PAVEL LEYVA  [id number]    HISTORY OF PRESENT ILLNESS: 79F reportedly no medical hx, per chart review has hx of hypothyroidism, RA, HTN, presents for 2 days of nasal congestion, clear rhinorrhea, cough, fever, a/w worsening lethargy and poor PO intake. Patient recently travelled to [facility], returned on 12/30. No known sick contacts in [facility] or [place]. No headache, SOB, CP, sputum production, abd pain, urinary frequency/incontinence, changes to bowel habits. No leg pain/swelling. Patient does report dysuria and vaginal itching for 6 months, which is unchanged with this acute episode. She denies vaginal discharge, bleeding, or drainage. She has not sought medical care for this. She takes no medications chronically, she has tried aspirin while ill for fever. Notably, her prior med list does include levothyroxine.    Notably, in 2022, patient was hospitalized with rash 2/2 cellulitis vs. low likelihood vasculitis, and had neutropenia at that time, associated with positive JOSELUIS and rheumatoid factor.    Rheumatology consulted given prior positive JOSELUIS and RF.     Rheum ROS    Denies fevers/chills/weight loss/night sweats/alopecia/sinus disease/asthma history/oral ulcers/dysphagia/vision changes/Raynauds/VTE/miscarraiges/sicca symptoms/urinary changes/edema/SOB/joint pain/swelling/jaw claudication/rash/photosensitivity/morning stiffness    Endorses fevers/chills/weight loss/night sweats/alopecia/sinus disease/asthma history/oral ulcers/dysphagia/vision changes/Raynauds/VTE/miscarraiges/sicca symptoms/urinary changes/edema/SOB/joint pain/swelling/jaw claudication/rash/photosensitivity/morning stiffness      MEDICATIONS  (STANDING):  piperacillin/tazobactam IVPB.. 3.375 Gram(s) IV Intermittent every 8 hours  sodium chloride 2% . 1000 milliLiter(s) (40 mL/Hr) IV Continuous <Continuous>    MEDICATIONS  (PRN):  acetaminophen     Tablet .. 650 milliGRAM(s) Oral every 6 hours PRN Temp greater or equal to 38C (100.4F), Mild Pain (1 - 3)  aluminum hydroxide/magnesium hydroxide/simethicone Suspension 30 milliLiter(s) Oral every 4 hours PRN Dyspepsia  melatonin 3 milliGRAM(s) Oral at bedtime PRN Insomnia  ondansetron Injectable 4 milliGRAM(s) IV Push every 8 hours PRN Nausea and/or Vomiting      Allergies    No Known Allergies    Intolerances        PERTINENT MEDICATION HISTORY: refer to H&P     SOCIAL HISTORY:  OCCUPATION:  TRAVEL HISTORY:    FAMILY HISTORY:      Vital Signs Last 24 Hrs  T(C): 37.2 (07 Jan 2025 04:52), Max: 37.6 (06 Jan 2025 18:57)  T(F): 98.9 (07 Jan 2025 04:52), Max: 99.7 (06 Jan 2025 18:57)  HR: 73 (07 Jan 2025 04:52) (69 - 97)  BP: 128/63 (07 Jan 2025 04:52) (120/58 - 160/81)  BP(mean): 71 (06 Jan 2025 11:39) (71 - 71)  RR: 18 (07 Jan 2025 04:52) (18 - 21)  SpO2: 97% (07 Jan 2025 04:52) (96% - 100%)    Parameters below as of 07 Jan 2025 04:52  Patient On (Oxygen Delivery Method): room air        ROS as noted above     Physical Exam:  General: No apparent distress  HEENT: EOMI, MMM  CVS: +S1/S2, RRR, no murmurs/rubs/gallops  Resp: CTA b/l. No crackles/wheezing  GI: Soft, NT/ND +BS  MSK: no swelling/warmth/erythema of the joints of the UE/LE  Neuro: AAOx3  Skin: no visible rashes    LABS:                        10.4   1.70  )-----------( 195      ( 07 Jan 2025 07:38 )             31.2     01-07    129[L]  |  94[L]  |  13  ----------------------------<  264[H]  3.4[L]   |  19[L]  |  1.00    Ca    8.2[L]      07 Jan 2025 07:38  Phos  2.1     01-07  Mg     2.1     01-07    TPro  6.7  /  Alb  2.8[L]  /  TBili  0.4  /  DBili  x   /  AST  41[H]  /  ALT  36  /  AlkPhos  220[H]  01-07    PT/INR - ( 05 Jan 2025 18:24 )   PT: 10.7 sec;   INR: 0.93 ratio         PTT - ( 05 Jan 2025 18:24 )  PTT:28.5 sec  Urinalysis Basic - ( 07 Jan 2025 07:38 )    Color: x / Appearance: x / SG: x / pH: x  Gluc: 264 mg/dL / Ketone: x  / Bili: x / Urobili: x   Blood: x / Protein: x / Nitrite: x   Leuk Esterase: x / RBC: x / WBC x   Sq Epi: x / Non Sq Epi: x / Bacteria: x    Rheumatoid Factor Quant, Serum or Plasma (07.10.22 @ 15:29)   Rheumatoid Factor Quant, Serum or Plasma: 124 IU/mL    Creatine Kinase, Serum: 32 U/L [25 - 170] (07-10-22 @ 15:29)  Sedimentation Rate, Erythrocyte: 118 mm/hr *H* [0 - 20] (07-10-22 @ 15:29)  C-Reactive Protein, Serum: 80 mg/L *H* [0 - 4] (07-10-22 @ 15:29)  Anti Nuclear Factor Titer: >1:2560 *!* [Antinuclear AB (JOSELUIS), IFA Method] (07-10-22 @ 15:29)    Procalcitonin: 1.41: This assay is intended for use to determine the change of PCT over time   as an aid in assessing the cumulative 28-day risk of all-cause mortality   for patients diagnosed with severe sepsis or septic shock in the ICU, or   when obtained in the emergency department or other medical wards prior to   ICU admission. This assay was performed by the Roche [employer] PCT   assay. ng/mL (01.05.25 @ 23:39) Respiratory Viral Panel with COVID-19 by [facility] (01.05.25 @ 18:18)   Rapid RVP Result: Detected  SARS-CoV-2: NotDetec: This Respiratory Panel uses polymerase chain reaction (PCR) to detect for   adenovirus; coronavirus (HKU1, NL63, 229E, OC43); human metapneumovirus   (hMPV); human enterovirus/rhinovirus (Entero/RV); influenza A; influenza   A/H1; influenza A/H3; influenza A/H1-2009; influenza B; parainfluenza   viruses 1, 2, 3, 4; respiratory syncytial virus; Mycoplasma pneumoniae;   Chlamydophila pneumoniae; and SARS-CoV-2.  Entero/Rhinovirus (RapRVP): Detected        RADIOLOGY & ADDITIONAL STUDIES:    < from: Xray Chest 1 View AP/PA (01.05.25 @ 18:19) >  ACC: [id number] EXAM:  XR CHEST AP OR PA 1V   ORDERED BY:  DONN MEZA     PROCEDURE DATE:  01/05/2025          INTERPRETATION:  CLINICAL INDICATION: Sepsis    TECHNIQUE: Frontal view of the chest was obtained.    COMPARISON: None.    FINDINGS:  Theheart size is normal.  The lungs are clear.  No pleural effusion.  No pneumothorax.  No acute osseous abnormalities.    IMPRESSION:  Clear lungs.    --- End of Report ---          VALENTINO NESS MD; Resident Radiologist  This document has been electronically signed.  DANILO CONNELLY MD; Attending Radiologist  This document has been electronically signed. Jan 6 2025 10:47AM    < end of copied text >   PAVEL LEYVA  [id number]    HISTORY OF PRESENT ILLNESS: 79F reportedly no medical hx, per chart review has hx of hypothyroidism, RA, HTN, presents for 2 days of nasal congestion, clear rhinorrhea, cough, fever, a/w worsening lethargy and poor PO intake. Patient recently travelled to [facility], returned on 12/30. No known sick contacts in [facility] or [place]. No headache, SOB, CP, sputum production, abd pain, urinary frequency/incontinence, changes to bowel habits. No leg pain/swelling. Patient does report dysuria and vaginal itching for 6 months, which is unchanged with this acute episode. She denies vaginal discharge, bleeding, or drainage. She has not sought medical care for this. She takes no medications chronically, she has tried aspirin while ill for fever. Notably, her prior med list does include levothyroxine.    Notably, in 2022, patient was hospitalized with rash 2/2 cellulitis vs. low likelihood vasculitis, and had neutropenia at that time, associated with positive JOSELUIS and rheumatoid factor.    Rheumatology consulted given prior positive JOSELUIS and RF.     History obtained from patient and son at bedside, son translated as per patient request, declined official   Reports few day history of fevers, cough productive of white and green phlegm, runny nose, L ear pain, and poor PO intake. Overall improved since being hospitalized  Recent travel to [facility], returned 12/30/24  Denies feeling unwell prior to these acute symptoms  No joint pain, swelling, erythema, redness, or AM stiffness  No alopecia, ulcers, Raynaud's, sicca symptoms, visual changes, headache, jaw claudication, PMR symptoms, muscle weakness, dysphagia, chest pain, cough, SOB, abdominal pain, vomiting, diarrhea, rashes, photosensitivity, or urinary changes  No family hx of rheumatologic disease. No hx of miscarriages or blood clots  Not UTD with malignancy screening  Immigrated from [facility] 10 years ago, lives with son       MEDICATIONS  (STANDING):  piperacillin/tazobactam IVPB.. 3.375 Gram(s) IV Intermittent every 8 hours  sodium chloride 2% . 1000 milliLiter(s) (40 mL/Hr) IV Continuous <Continuous>    MEDICATIONS  (PRN):  acetaminophen     Tablet .. 650 milliGRAM(s) Oral every 6 hours PRN Temp greater or equal to 38C (100.4F), Mild Pain (1 - 3)  aluminum hydroxide/magnesium hydroxide/simethicone Suspension 30 milliLiter(s) Oral every 4 hours PRN Dyspepsia  melatonin 3 milliGRAM(s) Oral at bedtime PRN Insomnia  ondansetron Injectable 4 milliGRAM(s) IV Push every 8 hours PRN Nausea and/or Vomiting      Allergies    No Known Allergies    Intolerances        PERTINENT MEDICATION HISTORY: refer to H&P     SOCIAL HISTORY: lives with son, no toxic habits, independent of ADLs  OCCUPATION: retired teacher   TRAVEL HISTORY: [facility] as above     FAMILY HISTORY: negative for rheumatologic disease       Vital Signs Last 24 Hrs  T(C): 37.2 (07 Jan 2025 04:52), Max: 37.6 (06 Jan 2025 18:57)  T(F): 98.9 (07 Jan 2025 04:52), Max: 99.7 (06 Jan 2025 18:57)  HR: 73 (07 Jan 2025 04:52) (69 - 97)  BP: 128/63 (07 Jan 2025 04:52) (120/58 - 160/81)  BP(mean): 71 (06 Jan 2025 11:39) (71 - 71)  RR: 18 (07 Jan 2025 04:52) (18 - 21)  SpO2: 97% (07 Jan 2025 04:52) (96% - 100%)    Parameters below as of 07 Jan 2025 04:52  Patient On (Oxygen Delivery Method): room air        ROS as noted above     Physical Exam:  General: No apparent distress, alert   HEENT: EOMI, MMM  CVS: +S1/S2, RRR, no murmurs  Resp: CTA b/l. No crackles/wheezing  GI: Soft, NT/ND +BS  MSK: hammertoes noted, no swelling/warmth/erythema of the joints of the UE/LE, muscle strength intact   Neuro: AAOx3  Skin: no visible rashes    LABS:                        10.4   1.70  )-----------( 195      ( 07 Jan 2025 07:38 )             31.2     01-07    129[L]  |  94[L]  |  13  ----------------------------<  264[H]  3.4[L]   |  19[L]  |  1.00    Ca    8.2[L]      07 Jan 2025 07:38  Phos  2.1     01-07  Mg     2.1     01-07    TPro  6.7  /  Alb  2.8[L]  /  TBili  0.4  /  DBili  x   /  AST  41[H]  /  ALT  36  /  AlkPhos  220[H]  01-07    PT/INR - ( 05 Jan 2025 18:24 )   PT: 10.7 sec;   INR: 0.93 ratio         PTT - ( 05 Jan 2025 18:24 )  PTT:28.5 sec  Urinalysis Basic - ( 07 Jan 2025 07:38 )    Color: x / Appearance: x / SG: x / pH: x  Gluc: 264 mg/dL / Ketone: x  / Bili: x / Urobili: x   Blood: x / Protein: x / Nitrite: x   Leuk Esterase: x / RBC: x / WBC x   Sq Epi: x / Non Sq Epi: x / Bacteria: x    Rheumatoid Factor Quant, Serum or Plasma (07.10.22 @ 15:29)   Rheumatoid Factor Quant, Serum or Plasma: 124 IU/mL    Creatine Kinase, Serum: 32 U/L [25 - 170] (07-10-22 @ 15:29)  Sedimentation Rate, Erythrocyte: 118 mm/hr *H* [0 - 20] (07-10-22 @ 15:29)  C-Reactive Protein, Serum: 80 mg/L *H* [0 - 4] (07-10-22 @ 15:29)  Anti Nuclear Factor Titer: >1:2560 *!* [Antinuclear AB (JOSELUIS), IFA Method] (07-10-22 @ 15:29)    Procalcitonin: 1.41: This assay is intended for use to determine the change of PCT over time   as an aid in assessing the cumulative 28-day risk of all-cause mortality   for patients diagnosed with severe sepsis or septic shock in the ICU, or   when obtained in the emergency department or other medical wards prior to   ICU admission. This assay was performed by the Roche [employer] PCT   assay. ng/mL (01.05.25 @ 23:39) Respiratory Viral Panel with COVID-19 by [facility] (01.05.25 @ 18:18)   Rapid RVP Result: Detected  SARS-CoV-2: NotDetec: This Respiratory Panel uses polymerase chain reaction (PCR) to detect for   adenovirus; coronavirus (HKU1, NL63, 229E, OC43); human metapneumovirus   (hMPV); human enterovirus/rhinovirus (Entero/RV); influenza A; influenza   A/H1; influenza A/H3; influenza A/H1-2009; influenza B; parainfluenza   viruses 1, 2, 3, 4; respiratory syncytial virus; Mycoplasma pneumoniae;   Chlamydophila pneumoniae; and SARS-CoV-2.  Entero/Rhinovirus (RapRVP): Detected        RADIOLOGY & ADDITIONAL STUDIES:    < from: Xray Chest 1 View AP/PA (01.05.25 @ 18:19) >  ACC: [id number] EXAM:  XR CHEST AP OR PA 1V   ORDERED BY:  DONN MEZA     PROCEDURE DATE:  01/05/2025          INTERPRETATION:  CLINICAL INDICATION: Sepsis    TECHNIQUE: Frontal view of the chest was obtained.    COMPARISON: None.    FINDINGS:  Theheart size is normal.  The lungs are clear.  No pleural effusion.  No pneumothorax.  No acute osseous abnormalities.    IMPRESSION:  Clear lungs.    --- End of Report ---          VALENTINO NESS MD; Resident Radiologist  This document has been electronically signed.  DANILO CONNELLY MD; Attending Radiologist  This document has been electronically signed. Jan 6 2025 10:47AM    < end of copied text >   PAVEL LEYVA  [id number]    HISTORY OF PRESENT ILLNESS: 79F reportedly no medical hx, per chart review has hx of hypothyroidism, RA, HTN, presents for 2 days of nasal congestion, clear rhinorrhea, cough, fever, a/w worsening lethargy and poor PO intake. Patient recently travelled to [facility], returned on 12/30. No known sick contacts in [facility] or [place]. No headache, SOB, CP, sputum production, abd pain, urinary frequency/incontinence, changes to bowel habits. No leg pain/swelling. Patient does report dysuria and vaginal itching for 6 months, which is unchanged with this acute episode. She denies vaginal discharge, bleeding, or drainage. She has not sought medical care for this. She takes no medications chronically, she has tried aspirin while ill for fever. Notably, her prior med list does include levothyroxine.    Notably, in 2022, patient was hospitalized with rash 2/2 cellulitis vs. low likelihood vasculitis, and had neutropenia at that time, associated with positive JOSELUIS and rheumatoid factor.    Rheumatology consulted given prior positive JOSELUIS and RF.     History obtained from patient and son at bedside, son translated as per patient request, declined official   Reports few day history of fevers, cough productive of white and green phlegm, runny nose, L ear pain, and poor PO intake. Overall improved since being hospitalized  Recent travel to [facility], returned 12/30/24  Denies feeling unwell prior to these acute symptoms  No joint pain, swelling, erythema, redness, or AM stiffness  No alopecia, ulcers, Raynaud's, sicca symptoms, visual changes, headache, jaw claudication, PMR symptoms, muscle weakness, dysphagia, chest pain, cough, SOB, abdominal pain, vomiting, diarrhea, rashes, photosensitivity, or urinary changes  No family hx of rheumatologic disease. No hx of miscarriages or blood clots  Not UTD with malignancy screening  Immigrated from [facility] 10 years ago, lives with son       MEDICATIONS  (STANDING):  piperacillin/tazobactam IVPB.. 3.375 Gram(s) IV Intermittent every 8 hours  sodium chloride 2% . 1000 milliLiter(s) (40 mL/Hr) IV Continuous <Continuous>    MEDICATIONS  (PRN):  acetaminophen     Tablet .. 650 milliGRAM(s) Oral every 6 hours PRN Temp greater or equal to 38C (100.4F), Mild Pain (1 - 3)  aluminum hydroxide/magnesium hydroxide/simethicone Suspension 30 milliLiter(s) Oral every 4 hours PRN Dyspepsia  melatonin 3 milliGRAM(s) Oral at bedtime PRN Insomnia  ondansetron Injectable 4 milliGRAM(s) IV Push every 8 hours PRN Nausea and/or Vomiting      Allergies    No Known Allergies    Intolerances        PERTINENT MEDICATION HISTORY: refer to H&P     SOCIAL HISTORY: lives with son, no toxic habits, independent of ADLs  OCCUPATION: retired teacher   TRAVEL HISTORY: [facility] as above     FAMILY HISTORY: negative for rheumatologic disease       Vital Signs Last 24 Hrs  T(C): 37.2 (07 Jan 2025 04:52), Max: 37.6 (06 Jan 2025 18:57)  T(F): 98.9 (07 Jan 2025 04:52), Max: 99.7 (06 Jan 2025 18:57)  HR: 73 (07 Jan 2025 04:52) (69 - 97)  BP: 128/63 (07 Jan 2025 04:52) (120/58 - 160/81)  BP(mean): 71 (06 Jan 2025 11:39) (71 - 71)  RR: 18 (07 Jan 2025 04:52) (18 - 21)  SpO2: 97% (07 Jan 2025 04:52) (96% - 100%)    Parameters below as of 07 Jan 2025 04:52  Patient On (Oxygen Delivery Method): room air        ROS as noted above     Physical Exam:  General: No apparent distress, alert   HEENT: EOMI, MMM  CVS: +S1/S2, RRR, no murmurs  Resp: CTA b/l. No crackles/wheezing  GI: Soft, NT/ND +BS, no splenomegaly   MSK: hammertoes noted, no swelling/warmth/erythema of the joints of the UE/LE, muscle strength intact   Neuro: AAOx3  Skin: no visible rashes    LABS:                        10.4   1.70  )-----------( 195      ( 07 Jan 2025 07:38 )             31.2     01-07    129[L]  |  94[L]  |  13  ----------------------------<  264[H]  3.4[L]   |  19[L]  |  1.00    Ca    8.2[L]      07 Jan 2025 07:38  Phos  2.1     01-07  Mg     2.1     01-07    TPro  6.7  /  Alb  2.8[L]  /  TBili  0.4  /  DBili  x   /  AST  41[H]  /  ALT  36  /  AlkPhos  220[H]  01-07    PT/INR - ( 05 Jan 2025 18:24 )   PT: 10.7 sec;   INR: 0.93 ratio         PTT - ( 05 Jan 2025 18:24 )  PTT:28.5 sec  Urinalysis Basic - ( 07 Jan 2025 07:38 )    Color: x / Appearance: x / SG: x / pH: x  Gluc: 264 mg/dL / Ketone: x  / Bili: x / Urobili: x   Blood: x / Protein: x / Nitrite: x   Leuk Esterase: x / RBC: x / WBC x   Sq Epi: x / Non Sq Epi: x / Bacteria: x    Rheumatoid Factor Quant, Serum or Plasma (07.10.22 @ 15:29)   Rheumatoid Factor Quant, Serum or Plasma: 124 IU/mL    Creatine Kinase, Serum: 32 U/L [25 - 170] (07-10-22 @ 15:29)  Sedimentation Rate, Erythrocyte: 118 mm/hr *H* [0 - 20] (07-10-22 @ 15:29)  C-Reactive Protein, Serum: 80 mg/L *H* [0 - 4] (07-10-22 @ 15:29)  Anti Nuclear Factor Titer: >1:2560 *!* [Antinuclear AB (JOSELUIS), IFA Method] (07-10-22 @ 15:29)    Procalcitonin: 1.41: This assay is intended for use to determine the change of PCT over time   as an aid in assessing the cumulative 28-day risk of all-cause mortality   for patients diagnosed with severe sepsis or septic shock in the ICU, or   when obtained in the emergency department or other medical wards prior to   ICU admission. This assay was performed by the Roche [employer] PCT   assay. ng/mL (01.05.25 @ 23:39) Respiratory Viral Panel with COVID-19 by [facility] (01.05.25 @ 18:18)   Rapid RVP Result: Detected  SARS-CoV-2: NotDetec: This Respiratory Panel uses polymerase chain reaction (PCR) to detect for   adenovirus; coronavirus (HKU1, NL63, 229E, OC43); human metapneumovirus   (hMPV); human enterovirus/rhinovirus (Entero/RV); influenza A; influenza   A/H1; influenza A/H3; influenza A/H1-2009; influenza B; parainfluenza   viruses 1, 2, 3, 4; respiratory syncytial virus; Mycoplasma pneumoniae;   Chlamydophila pneumoniae; and SARS-CoV-2.  Entero/Rhinovirus (RapRVP): Detected        RADIOLOGY & ADDITIONAL STUDIES:    < from: Xray Chest 1 View AP/PA (01.05.25 @ 18:19) >  ACC: [id number] EXAM:  XR CHEST AP OR PA 1V   ORDERED BY:  DONN MEZA     PROCEDURE DATE:  01/05/2025          INTERPRETATION:  CLINICAL INDICATION: Sepsis    TECHNIQUE: Frontal view of the chest was obtained.    COMPARISON: None.    FINDINGS:  Theheart size is normal.  The lungs are clear.  No pleural effusion.  No pneumothorax.  No acute osseous abnormalities.    IMPRESSION:  Clear lungs.    --- End of Report ---          VALENTINO NESS MD; Resident Radiologist  This document has been electronically signed.  DANILO CONNELLY MD; Attending Radiologist  This document has been electronically signed. Jan 6 2025 10:47AM    < end of copied text >   PAVEL LEYVA  [id number]    HISTORY OF PRESENT ILLNESS: 79F reportedly no medical hx, per chart review has hx of hypothyroidism, RA, HTN, presents for 2 days of nasal congestion, clear rhinorrhea, cough, fever, a/w worsening lethargy and poor PO intake. Patient recently travelled to [facility], returned on 12/30. No known sick contacts in [facility] or [place]. No headache, SOB, CP, sputum production, abd pain, urinary frequency/incontinence, changes to bowel habits. No leg pain/swelling. Patient does report dysuria and vaginal itching for 6 months, which is unchanged with this acute episode. She denies vaginal discharge, bleeding, or drainage. She has not sought medical care for this. She takes no medications chronically, she has tried aspirin while ill for fever. Notably, her prior med list does include levothyroxine.    Notably, in 2022, patient was hospitalized with rash 2/2 cellulitis vs. low likelihood vasculitis, and had neutropenia at that time, associated with positive JOSELUIS and rheumatoid factor.    Rheumatology consulted given prior positive JOSELUIS and RF.     History obtained from patient and son at bedside, son translated as per patient request, declined official   Reports few day history of fevers, cough productive of white and green phlegm, runny nose, L ear pain, and poor PO intake. Overall improved since being hospitalized  Recent travel to [facility], returned 12/30/24  Denies feeling unwell prior to these acute symptoms  No joint pain, swelling, erythema, redness, or AM stiffness  No alopecia, ulcers, Raynaud's, sicca symptoms, visual changes, headache, jaw claudication, PMR symptoms, muscle weakness, dysphagia, chest pain, cough, SOB, abdominal pain, vomiting, diarrhea, rashes, photosensitivity, or urinary changes  No family hx of rheumatologic disease. No hx of miscarriages or blood clots  Not UTD with malignancy screening  Immigrated from [facility] 10 years ago, lives with son     MEDICATIONS  (STANDING):  piperacillin/tazobactam IVPB.. 3.375 Gram(s) IV Intermittent every 8 hours  sodium chloride 2% . 1000 milliLiter(s) (40 mL/Hr) IV Continuous <Continuous>    MEDICATIONS  (PRN):  acetaminophen     Tablet .. 650 milliGRAM(s) Oral every 6 hours PRN Temp greater or equal to 38C (100.4F), Mild Pain (1 - 3)  aluminum hydroxide/magnesium hydroxide/simethicone Suspension 30 milliLiter(s) Oral every 4 hours PRN Dyspepsia  melatonin 3 milliGRAM(s) Oral at bedtime PRN Insomnia  ondansetron Injectable 4 milliGRAM(s) IV Push every 8 hours PRN Nausea and/or Vomiting      Allergies    No Known Allergies    Intolerances    PERTINENT MEDICATION HISTORY: refer to H&P     SOCIAL HISTORY: lives with son, no toxic habits, independent of ADLs  OCCUPATION: retired teacher   TRAVEL HISTORY: [facility] as above     FAMILY HISTORY: negative for rheumatologic disease     Vital Signs Last 24 Hrs  T(C): 37.2 (07 Jan 2025 04:52), Max: 37.6 (06 Jan 2025 18:57)  T(F): 98.9 (07 Jan 2025 04:52), Max: 99.7 (06 Jan 2025 18:57)  HR: 73 (07 Jan 2025 04:52) (69 - 97)  BP: 128/63 (07 Jan 2025 04:52) (120/58 - 160/81)  BP(mean): 71 (06 Jan 2025 11:39) (71 - 71)  RR: 18 (07 Jan 2025 04:52) (18 - 21)  SpO2: 97% (07 Jan 2025 04:52) (96% - 100%)    Parameters below as of 07 Jan 2025 04:52  Patient On (Oxygen Delivery Method): room air    ROS as noted above     Physical Exam:  General: No apparent distress, alert   HEENT: EOMI, MMM  CVS: +S1/S2, RRR, no murmurs  Resp: CTA b/l. No crackles/wheezing  GI: Soft, NT/ND +BS, no splenomegaly   MSK: hammertoes noted, no swelling/warmth/erythema of the joints of the UE/LE, muscle strength intact   Neuro: AAOx3  Skin: no visible rashes    LABS:                        10.4   1.70  )-----------( 195      ( 07 Jan 2025 07:38 )             31.2     01-07    129[L]  |  94[L]  |  13  ----------------------------<  264[H]  3.4[L]   |  19[L]  |  1.00    Ca    8.2[L]      07 Jan 2025 07:38  Phos  2.1     01-07  Mg     2.1     01-07    TPro  6.7  /  Alb  2.8[L]  /  TBili  0.4  /  DBili  x   /  AST  41[H]  /  ALT  36  /  AlkPhos  220[H]  01-07    PT/INR - ( 05 Jan 2025 18:24 )   PT: 10.7 sec;   INR: 0.93 ratio         PTT - ( 05 Jan 2025 18:24 )  PTT:28.5 sec  Urinalysis Basic - ( 07 Jan 2025 07:38 )    Color: x / Appearance: x / SG: x / pH: x  Gluc: 264 mg/dL / Ketone: x  / Bili: x / Urobili: x   Blood: x / Protein: x / Nitrite: x   Leuk Esterase: x / RBC: x / WBC x   Sq Epi: x / Non Sq Epi: x / Bacteria: x    Rheumatoid Factor Quant, Serum or Plasma (07.10.22 @ 15:29)   Rheumatoid Factor Quant, Serum or Plasma: 124 IU/mL    Creatine Kinase, Serum: 32 U/L [25 - 170] (07-10-22 @ 15:29)  Sedimentation Rate, Erythrocyte: 118 mm/hr *H* [0 - 20] (07-10-22 @ 15:29)  C-Reactive Protein, Serum: 80 mg/L *H* [0 - 4] (07-10-22 @ 15:29)  Anti Nuclear Factor Titer: >1:2560 *!* [Antinuclear AB (JOSELUIS), IFA Method] (07-10-22 @ 15:29)    Procalcitonin: 1.41: This assay is intended for use to determine the change of PCT over time   as an aid in assessing the cumulative 28-day risk of all-cause mortality   for patients diagnosed with severe sepsis or septic shock in the ICU, or   when obtained in the emergency department or other medical wards prior to   ICU admission. This assay was performed by the [employer] PCT   assay. ng/mL (01.05.25 @ 23:39) Respiratory Viral Panel with COVID-19 by RALF (01.05.25 @ 18:18)   Rapid RVP Result: Detected  SARS-CoV-2: NotDetec: This Respiratory Panel uses polymerase chain reaction (PCR) to detect for   adenovirus; coronavirus (HKU1, NL63, 229E, OC43); human metapneumovirus   (hMPV); human enterovirus/rhinovirus (Entero/RV); influenza A; influenza   A/H1; influenza A/H3; influenza A/H1-2009; influenza B; parainfluenza   viruses 1, 2, 3, 4; respiratory syncytial virus; Mycoplasma pneumoniae;   Chlamydophila pneumoniae; and SARS-CoV-2.  Entero/Rhinovirus (RapRVP): Detected  RADIOLOGY & ADDITIONAL STUDIES:    < from: Xray Chest 1 View AP/PA (01.05.25 @ 18:19) >  ACC: [id number] EXAM:  XR CHEST AP OR PA 1V   ORDERED BY:  DONN MEZA     PROCEDURE DATE:  01/05/2025          INTERPRETATION:  CLINICAL INDICATION: Sepsis    TECHNIQUE: Frontal view of the chest was obtained.    COMPARISON: None.    FINDINGS:  Theheart size is normal.  The lungs are clear.  No pleural effusion.  No pneumothorax.  No acute osseous abnormalities.    IMPRESSION:  Clear lungs.    --- End of Report ---    VALENTINO NESS MD; Resident Radiologist  This document has been electronically signed.  DANILO CONNELLY MD; Attending Radiologist  This document has been electronically signed. Jan 6 2025 10:47AM    < end of copied text >

## 2025-01-07 NOTE — CHART NOTE - NSCHARTNOTEFT_GEN_A_CORE
Notified that SNa 132 on repeat labs. Advised D5w at 83 cc/hr and recheck labs at 12am. Okay with correction up to 12 within 48 hours.     If you have any questions, please feel free to contact me  Edgar Berry  Nephrology Fellow  Page [phone] / Microsoft Teams (Preferred)  (After 4pm or on weekends please page the on-call fellow)

## 2025-01-07 NOTE — SWALLOW BEDSIDE ASSESSMENT ADULT - H & P REVIEW
yes 80 yo F reportedly no medical hx, per chart review has hx of hypothyroidism, RA, HTN, presents for 2 days of nasal congestion, clear rhinorrhea, cough, fever, a/w worsening lethargy and poor PO intake. Recently travelled to [place] [facility], returned on 12/30. No known sick contacts in [place] [facility] or [place]. No headache, SOB, CP, sputum production, abd pain, urinary frequency/incontinence, changes to bowel habits. Patient does report dysuria and vaginal itching for 6 months, which is unchanged with this acute episode. She takes no medications chronically, she has tried aspirin while ill for fever. Notably, her prior med list does include levothyroxine. Notably, in 2022, patient was hospitalized with rash 2/2 cellulitis vs. low likelihood vasculitis, and had neutropenia at that time, associated with positive JOSELUIS and rheumatoid factor. IMAGING: CXR - clear lungs. **Patient is new to this service./yes

## 2025-01-07 NOTE — PROGRESS NOTE ADULT - PROBLEM SELECTOR PLAN 4
DVT: Lovenox  Diet: easy to chew  Dispo: pending PT DVT: Lovenox  Diet: regular  Dispo: Appreciated PT eval - Home PT

## 2025-01-07 NOTE — CONSULT NOTE ADULT - ASSESSMENT
79F with no report of PMHx and not on home medications, presented with fever and weakness. Found to have severe neutropenia, hyponatremia, and positive for Enterovirus. Rheumatology consulted given prior positive JOSELUIS and RF.     #Severe neutropenia   #Sepsis   #Enterorhinovirus   - Admitted for sepsis with absolute neutrophil count 0   - Has prior neutropenia but workup (bone marrow biopsy) never completed)     #Positive RF   - Noted with  in 2022 (checked in setting of infection)     #JOSELUIS >1:2560 DFS70   - Checked in 2022 in context of cellulitis   - Additionally autoantibodies against dense fine speckles 70 (DFS70) are found in 10% of healthy individuals, but only in a tiny population of patients with autoimmune rheumatic disease. The antibody may thus be a marker of autoimmune rheumatic disease negativity     INCOMPLETE PLEASE WAIT     Diane Hansen MD  Rheumatology Fellow  Available on TEAMS  79F with no report of PMHx and not on home medications, presented with fever and weakness. Found to have severe neutropenia, hyponatremia, and positive for Enterovirus. Rheumatology consulted given prior positive JOSELUIS and RF.     #Severe neutropenia   #Sepsis   #Enterorhinovirus   - Admitted for sepsis with absolute neutrophil count 0   - Has prior neutropenia but workup (bone marrow biopsy) never completed    #Positive RF   - Noted with  in 2022 (checked in setting of infection)     #JOSELUIS >1:2560 DFS70   - Checked in 2022 in context of cellulitis   - Additionally autoantibodies against dense fine speckles 70 (DFS70) are found in 10% of healthy individuals, but only in a tiny population of patients with autoimmune rheumatic disease. The antibody may thus be a marker of autoimmune rheumatic disease negativity     INCOMPLETE PLEASE WAIT     Diane Hansen MD  Rheumatology Fellow  Available on TEAMS  79F with no report of PMHx and not on home medications, presented with fever and weakness. Found to have severe neutropenia, hyponatremia, and positive for Enterovirus. Rheumatology consulted given prior positive JOSELUIS and RF.     #Severe neutropenia   #Sepsis   #Enterorhinovirus   - Admitted for sepsis with absolute neutrophil count 0, procalcitonin elevated to 1.4, RVP positive enterorhinovirus   - Has prior neutropenia but workup (bone marrow biopsy) never completed  - Started on zosyn with clinical improvement noted   - Suspect acute presentation is likely due to infection     #Positive RF   - Noted with  in 2022 (checked in setting of infection)   - RF is nonspecific and can be elevated in infection and malignancy   - Pt currently without any joint complaints, no synovitis on exam     #JOSELUIS >1:2560 DFS70   - Checked in 2022 in context of cellulitis   - Pt has no stigmata of CTD on history or exam   - Additionally autoantibodies against dense fine speckles 70 (DFS70) are found in 10% of healthy individuals, but only in a tiny population of patients with autoimmune rheumatic disease. The antibody may thus be a marker of autoimmune rheumatic disease negativity   - Low suspicion for connective tissue disease at this time     Recommendations:   - Appreciate Hematology evaluation     INCOMPLETE PLEASE WAIT     Diane Hansen MD  Rheumatology Fellow  Available on TEAMS  79F with no report of PMHx and not on home medications, presented with fever and weakness. Found to have severe neutropenia, hyponatremia, and positive for Enterovirus. Rheumatology consulted given prior positive JOSELUIS and RF.     #Severe neutropenia   #Sepsis   #Enterorhinovirus   - Admitted for sepsis with absolute neutrophil count 0, procalcitonin elevated to 1.4, RVP positive enterorhinovirus   - Has prior neutropenia but workup (i.e. bone marrow biopsy) never completed  - Started on zosyn with clinical improvement noted   - Suspect acute clinical presentation is likely due to infection, but underlying neutropenia needs further evaluation to r/o malignancy and other etiologies     #Positive RF   - Noted with  in 2022 (checked in setting of infection). RF is nonspecific and can be elevated in infection and malignancy   - Pt currently without any joint complaints, no synovitis on exam, abisai noted   - Given positive RF and neutropenia, Felty's syndrome is a consideration though lower suspicion overall given lack of longstanding joint symptoms and lack of splenomegaly on exam     #JOSELUIS >1:2560 DFS70   - Checked in 2022 in context of cellulitis   - Pt has no stigmata of CTD on history or exam   - Additionally autoantibodies against dense fine speckles 70 (DFS70) are found in 10% of healthy individuals, but only in a tiny population of patients with autoimmune rheumatic disease. The antibody may thus be a marker of autoimmune rheumatic disease negativity   - Low suspicion for connective tissue disease at this time     Recommendations:   - Appreciate Hematology evaluation - s/p bone marrow biopsy, will f/u results  - Please obtain RF and CCP  - Please obtain x-rays b/l hands/wrists and feet/ankles to assess for erosive changes  - Please obtain US abdomen to assess for splenomegaly   - Will continue to follow    Discussed with son at bedside  Discussed with primary team  Discussed with Dr. Pawan Hansen MD  Rheumatology Fellow  Available on TEAMS  No

## 2025-01-07 NOTE — PROGRESS NOTE ADULT - ASSESSMENT
79F with no report of PMHx and not on home medications, presented with fever and weakness. Found to have severe neutropenia, hyponatremia, and positive for Enterovirus. Admitted to medicine for further workup. Per chart review and collateral obtained from PCP, patient appeared to have chronic neutropenia (since 2022) and followed Dr. Tonja Perry ([facility]), however, lost to follow up. Patient is empirically treated with Vanc and Zosyn. Heme was consulted. 79F with no report of PMHx and not on home medications, presented with fever and weakness. Found to have severe neutropenia, hyponatremia, and positive for Enterovirus. Admitted to medicine for further workup. Patient is empirically treated with Vanc and Zosyn. Heme was consulted.      Per chart review and collateral obtained from PCP  - Labs showed neutropenia in 2022, and followed Dr. Tonja Perry ([facility]), however, lost to follow up.  - diagnosed with RA in 2022, followed Dr. Lucy Almanzar at that time. Further workup indicated RA limited to feet, so NSAID was recommended with close monitor. However, patient lost to follow up subsequently  - Lyme serology positive in 2022, negative confirmatory immunoblot IgG and IgM. Serologic response to B. burgdorferi infection not detected, recommended for follow up in 7-14 days. However, patient lost to follow up

## 2025-01-07 NOTE — PROGRESS NOTE ADULT - ASSESSMENT
80 y/o F reportedly no medical hx, per chart review has hx of hypothyroidism, RA, HTN, presents for 2 days of nasal congestion, clear rhinorrhea, cough, fever, a/w worsening lethargy and poor PO intake. Patient recently travelled to [facility], returned on 12/30. Now patient has neutropenic ?sepsis.     SNa was 122 at the time of admission. Pt received IV fluids and it improved to 120. Serum osmolality 250. Urine osm 442. Urine Na 55. Pt is not on any medications and has poor PO intake.     Hypo-osmolar hyponatremia:  Likely SIADH.   SNa improved to 129 and corrected  Na is 132.   Oral fluid restriction to <1L a day.   Encourage PO intake of food as osmolar load would improve the serum sodium.   Change all drips to NS instead of D5.   Avoid rapid correction and goal correction is <6-8 mEq/L a day.

## 2025-01-07 NOTE — PROGRESS NOTE ADULT - PROBLEM SELECTOR PLAN 3
Reported symptoms for several months. Haven't been evaluated outpatient    Plan  - Fluconazole 150mg x1  - outpatient gyn f/u

## 2025-01-07 NOTE — CONSULT NOTE ADULT - ATTENDING COMMENTS
79 year old lady admitted for neutropenic sepsis  found to have hyponatremia- SIADH   Received 2% saline overnight with sodium 120>129   corrected sodium 132.     fluid restrict to 1 L   encourage protein/ normal salt intake    roxi hong  nephrology attending   please contact me on TEAMS   Office- [phone]
+ RF of unclear significance found during w/u for admitting issues but paucity of overt inflammatory arthritis findings, JOSELUIS highly likely to be false + given pattern. W/u as above to fully eval + RF. Heme note and w/u appreciated.
79F with PMH hypothyroidism, RA, and HTN with previous episode of neutropenia in 2022 presenting with fever and URI symptoms found to have enterorhinovirus. Heme consulted for severe neutropenia ( on admission now 0). Patient had episodes of severe neutropenia in the past (ANC 30) with normal flow cytometry but no bone marrow biopsy performed. Patient does not take any medications to explain neutropenia.    Recommendations:   - Will obtain flow cytometry and assess peripheral blood smear  - If peripheral smear is abnormal will consider BMB.   - Please consult rheumatology to rule out autoimmune causes given previous positive RF and elevated JOSELUIS

## 2025-01-07 NOTE — SWALLOW BEDSIDE ASSESSMENT ADULT - SWALLOW EVAL: DIAGNOSIS
Patient is an 79 year old female with no report of PMHx and not on home medications, presented with fever and weakness. Found to have severe neutropenia, hyponatremia, and positive for Enterovirus. Patient is an 79 year old female with no report of PMHx and not on home medications, presented with fever and weakness. Found to have severe neutropenia, hyponatremia, and positive for Enterovirus. Patient now presents with an overtly functional oropharyngeal swallow for regular solids/thin liquids. No overt s/s of aspiration observed across trials. No skilled ST needs.

## 2025-01-07 NOTE — PROGRESS NOTE ADULT - PROBLEM SELECTOR PLAN 1
presented with fever and severe neutropenia -  on admission --> 0 (1/6)  positive for JOSELUIS and RF in the past. Not taking any medications at home per son  (+) Enterovirus. CXR showed clear lungs      Plan  -Heme consulted, f/u recs  -empirically treated with Vanc and Zosyn - de-escalate pending BCx, UCx  -Workup: ID (HIV, Hepatitis panel, EBV, CMV), Metabolic (folate, B12, iron), TLS (uric acid, reticulocyte, LDH, Hapto)  -?ppx for antiviral, antifungal presented with fever and severe neutropenia -  on admission --> 0 (1/6)  positive for JOSELUIS and RF in the past. Not taking any medications at home per son    Workup:   (+) Enterovirus. (+)EBV IgG/EBNA/VCA/EA. (+) CMV IgG - previous infection vs. reactivation  Low uric acid (2.1), elevated LDH (292), Hapto wnl  - Appreciated Heme recs    Plan  - empirically treated with Vanc and Zosyn - de-escalate pending BCx, UCx  - ?ppx for antiviral, antifungal  - f/u peripheral blood smear, consider BHB if abnormal per Heme  - Rheum c/s given (+)JOSELUIS, RF previously presented with fever and severe neutropenia -  on admission --> 0 (1/6)  positive for JOSELUIS and RF in the past. Not taking any medications at home per son    Workup:   (+) Enterovirus. (+)EBV IgG/EBNA/VCA/EA. (+) CMV IgG - previous infection vs. reactivation  Low uric acid (2.1), elevated LDH (292), Hapto wnl  - Appreciated Heme recs    Plan  - dc Vancomycin (mrsa pcr negative)  - c/w Zosyn - will discontinue if workup for bacterial infection unremarkable  - f/u peripheral blood smear, consider BHB if abnormal per Heme  - Rheum c/s given (+)JOSELUIS, RF previously presented with fever and severe neutropenia -  on admission --> 0 (1/6)  positive for JOSELUIS and RF in the past, not on any medications per son  Positive for Lyme serology back in 2022, however, negative confirmatory immunoblot IgG or IgM. Per PCP, no lyme disease or treatment outpatient.    Workup:   (+) Enterovirus. (+)EBV IgG/EBNA/VCA/EA. (+) CMV IgG - previous infection vs. reactivation  Low uric acid (2.1), elevated LDH (292), Hapto wnl  - Appreciated Heme recs    Plan  - dc Vancomycin (mrsa pcr negative)  - c/w Zosyn - will discontinue if workup for bacterial infection unremarkable  - f/u peripheral blood smear, consider BHB if abnormal per Heme  - Rheum c/s given (+)JOSELUIS, RF previously

## 2025-01-07 NOTE — SWALLOW BEDSIDE ASSESSMENT ADULT - SLP GENERAL OBSERVATIONS
Patient received alert, OOB in chair; on room air; son present at bedside.  service offered, however son agreeable to provide interpretation. Patient pleasant and agreeable to evaluation.

## 2025-01-07 NOTE — SWALLOW BEDSIDE ASSESSMENT ADULT - ADDITIONAL RECOMMENDATIONS
long term goal: the patient will tolerate the least restrictive oral diet without showing overt s/s of aspiration

## 2025-01-07 NOTE — PROCEDURE NOTE - ADDITIONAL PROCEDURE DETAILS
Hematology/Oncology Procedure Note    Bone Marrow Aspiration/Biopsy    Indication:     Bone marrow aspiration and biopsy procedure description, risks, and benefits were discussed in detail with the patient.  All questions were answered.  Informed consent was obtained and time-out performed.      The area of the right/left posterior iliac crest was prepped and draped using sterile technique. Local anesthetic with 2% Lidocaine.    Bone marrow aspiration and biopsy was performed using sterile technique by Dr. Kathy Galdamez assist and supervision by Dr. Adela Martin. Specimens were obtained. No complications and less than 2 cc of blood loss.     The procedure was well tolerated and no local bleeding or other complications were observed.  Pressure was applied to the procedure site and a wound dressing was placed.  The patient and nursing staff were advised that the patient is to lie flat for 30 minutes post procedure and not to shower or change the dressing for 24 hours. Tylenol may be used if no contraindications for pain at the procedure site.

## 2025-01-07 NOTE — PROGRESS NOTE ADULT - PROBLEM SELECTOR PLAN 2
on admission 122 --> 121 --> 120  Sosmo: 250, Uosmo: 442, Ankita: 69  - likely SIADH vs poor intake     Plan  Appreciated Nephro recs  - Fluid restriction <1L  - Trial hypertonic saline 2% 30cc/hr for 6hrs - correction goal <6mEq/L a day.   - Trend cmp q12h on admission 122 --> 121 --> 120 --> 2% HTS 30cc/hr for 6hrs --> 129 (1/7)  Sosmo: 250, Uosmo: 442, Ankita: 69  - likely SIADH vs poor intake   - Appreciated Nephro recs    Plan  - monitor off HTS  - Fluid restriction <1L  - Trend cmp

## 2025-01-07 NOTE — PROGRESS NOTE ADULT - SUBJECTIVE AND OBJECTIVE BOX
Patient is a 79y old  Female who presents with a chief complaint of Sepsis (06 Jan 2025 16:50)      SUBJECTIVE / OVERNIGHT EVENTS:  No acute event overnight    Pt seen and examined at bedside. Had no complaints. Denied cp, sob, d/n/v    MEDICATIONS  (STANDING):  heparin   Injectable 5000 Unit(s) SubCutaneous every 12 hours  piperacillin/tazobactam IVPB.. 3.375 Gram(s) IV Intermittent every 8 hours  sodium chloride 2% . 1000 milliLiter(s) (40 mL/Hr) IV Continuous <Continuous>  vancomycin  IVPB 500 milliGRAM(s) IV Intermittent every 24 hours    MEDICATIONS  (PRN):  acetaminophen     Tablet .. 650 milliGRAM(s) Oral every 6 hours PRN Temp greater or equal to 38C (100.4F), Mild Pain (1 - 3)  aluminum hydroxide/magnesium hydroxide/simethicone Suspension 30 milliLiter(s) Oral every 4 hours PRN Dyspepsia  melatonin 3 milliGRAM(s) Oral at bedtime PRN Insomnia  ondansetron Injectable 4 milliGRAM(s) IV Push every 8 hours PRN Nausea and/or Vomiting      CAPILLARY BLOOD GLUCOSE        I&O's Summary    06 Jan 2025 07:01  -  07 Jan 2025 06:46  --------------------------------------------------------  IN: 320 mL / OUT: 0 mL / NET: 320 mL        Vital Signs Last 24 Hrs  T(C): 37.2 (07 Jan 2025 04:52), Max: 37.6 (06 Jan 2025 18:57)  T(F): 98.9 (07 Jan 2025 04:52), Max: 99.7 (06 Jan 2025 18:57)  HR: 73 (07 Jan 2025 04:52) (66 - 97)  BP: 128/63 (07 Jan 2025 04:52) (120/58 - 160/81)  BP(mean): 71 (06 Jan 2025 11:39) (71 - 80)  RR: 18 (07 Jan 2025 04:52) (18 - 21)  SpO2: 97% (07 Jan 2025 04:52) (96% - 100%)    Parameters below as of 07 Jan 2025 04:52  Patient On (Oxygen Delivery Method): room air        Physical Exam  CONSTITUTIONAL: NAD  EYES: EOMI, conjunctiva and sclera clear  ENMT: Moist oral mucosa  NECK: Supple  RESPIRATORY: Breathing unlabored, CTAB  CARDIOVASCULAR: S1S2 no MRG  ABDOMEN: Nontender to palpation, normoactive bowel sounds, no rebound/guarding  MUSCULOSKELETAL: no clubbing or cyanosis of digits  NEUROLOGY: No focal deficits   SKIN: No rashes or lesions    LABS:                        10.9   1.70  )-----------( 172      ( 06 Jan 2025 08:26 )             32.9      01-06    120[LL]  |  87[L]  |  8   ----------------------------<  133[H]  3.6   |  19[L]  |  0.84    Ca    8.7      06 Jan 2025 08:26  Phos  1.9     01-06  Mg     1.9     01-06    TPro  6.9  /  Alb  3.2[L]  /  TBili  1.1  /  DBili  x   /  AST  57[H]  /  ALT  39  /  AlkPhos  254[H]  01-06    PT/INR - ( 05 Jan 2025 18:24 )   PT: 10.7 sec;   INR: 0.93 ratio         PTT - ( 05 Jan 2025 18:24 )  PTT:28.5 sec      Urinalysis Basic - ( 06 Jan 2025 08:26 )    Color: x / Appearance: x / SG: x / pH: x  Gluc: 133 mg/dL / Ketone: x  / Bili: x / Urobili: x   Blood: x / Protein: x / Nitrite: x   Leuk Esterase: x / RBC: x / WBC x   Sq Epi: x / Non Sq Epi: x / Bacteria: x        RADIOLOGY & ADDITIONAL TESTS:    Imaging Personally Reviewed:    Consultant(s) Notes Reviewed:      Care Discussed with Consultants/Other Providers:   Patient is a 79y old  Female who presents with a chief complaint of Sepsis (06 Jan 2025 16:50)      SUBJECTIVE / OVERNIGHT EVENTS:  No acute event overnight    Pt seen and examined at bedside. Son was also at bedside. Reported feeling better. Endorsed mild sore throat.  Denied fever, chill, cp, sob, d/n/v    MEDICATIONS  (STANDING):  heparin   Injectable 5000 Unit(s) SubCutaneous every 12 hours  piperacillin/tazobactam IVPB.. 3.375 Gram(s) IV Intermittent every 8 hours  sodium chloride 2% . 1000 milliLiter(s) (40 mL/Hr) IV Continuous <Continuous>  vancomycin  IVPB 500 milliGRAM(s) IV Intermittent every 24 hours    MEDICATIONS  (PRN):  acetaminophen     Tablet .. 650 milliGRAM(s) Oral every 6 hours PRN Temp greater or equal to 38C (100.4F), Mild Pain (1 - 3)  aluminum hydroxide/magnesium hydroxide/simethicone Suspension 30 milliLiter(s) Oral every 4 hours PRN Dyspepsia  melatonin 3 milliGRAM(s) Oral at bedtime PRN Insomnia  ondansetron Injectable 4 milliGRAM(s) IV Push every 8 hours PRN Nausea and/or Vomiting      CAPILLARY BLOOD GLUCOSE        I&O's Summary    06 Jan 2025 07:01  -  07 Jan 2025 06:46  --------------------------------------------------------  IN: 320 mL / OUT: 0 mL / NET: 320 mL        Vital Signs Last 24 Hrs  T(C): 37.2 (07 Jan 2025 04:52), Max: 37.6 (06 Jan 2025 18:57)  T(F): 98.9 (07 Jan 2025 04:52), Max: 99.7 (06 Jan 2025 18:57)  HR: 73 (07 Jan 2025 04:52) (66 - 97)  BP: 128/63 (07 Jan 2025 04:52) (120/58 - 160/81)  BP(mean): 71 (06 Jan 2025 11:39) (71 - 80)  RR: 18 (07 Jan 2025 04:52) (18 - 21)  SpO2: 97% (07 Jan 2025 04:52) (96% - 100%)    Parameters below as of 07 Jan 2025 04:52  Patient On (Oxygen Delivery Method): room air        Physical Exam  CONSTITUTIONAL: NAD  EYES: EOMI, conjunctiva and sclera clear  ENMT: Moist oral mucosa  NECK: Supple. No pain with swallowing. No lymphadenopathy appreciated   RESPIRATORY: Breathing unlabored on room air. Decreased breath sounds in lower lobes b/l  CARDIOVASCULAR: S1S2 no MRG  ABDOMEN: Nontender to palpation, normoactive bowel sounds, no rebound/guarding  MUSCULOSKELETAL: no clubbing or cyanosis of digits  NEUROLOGY: No focal deficits   SKIN: No rashes or lesions    LABS:                        10.9   1.70  )-----------( 172      ( 06 Jan 2025 08:26 )             32.9      01-06    120[LL]  |  87[L]  |  8   ----------------------------<  133[H]  3.6   |  19[L]  |  0.84    Ca    8.7      06 Jan 2025 08:26  Phos  1.9     01-06  Mg     1.9     01-06    TPro  6.9  /  Alb  3.2[L]  /  TBili  1.1  /  DBili  x   /  AST  57[H]  /  ALT  39  /  AlkPhos  254[H]  01-06    PT/INR - ( 05 Jan 2025 18:24 )   PT: 10.7 sec;   INR: 0.93 ratio         PTT - ( 05 Jan 2025 18:24 )  PTT:28.5 sec      Urinalysis Basic - ( 06 Jan 2025 08:26 )    Color: x / Appearance: x / SG: x / pH: x  Gluc: 133 mg/dL / Ketone: x  / Bili: x / Urobili: x   Blood: x / Protein: x / Nitrite: x   Leuk Esterase: x / RBC: x / WBC x   Sq Epi: x / Non Sq Epi: x / Bacteria: x        RADIOLOGY & ADDITIONAL TESTS:    Imaging Personally Reviewed:    Consultant(s) Notes Reviewed:      Care Discussed with Consultants/Other Providers:

## 2025-01-07 NOTE — PROGRESS NOTE ADULT - SUBJECTIVE AND OBJECTIVE BOX
[facility] Division of Kidney Diseases & Hypertension  FOLLOW UP NOTE  [phone]--------------------------------------------------------------------------------  Chief Complaint:Fever due to unspecified condition        24 hour events/subjective:        PAST HISTORY  --------------------------------------------------------------------------------  No significant changes to PMH, PSH, FHx, SHx, unless otherwise noted    ALLERGIES & MEDICATIONS  --------------------------------------------------------------------------------  Allergies    No Known Allergies    Intolerances      Standing Inpatient Medications  piperacillin/tazobactam IVPB.. 3.375 Gram(s) IV Intermittent every 8 hours    PRN Inpatient Medications  acetaminophen     Tablet .. 650 milliGRAM(s) Oral every 6 hours PRN  aluminum hydroxide/magnesium hydroxide/simethicone Suspension 30 milliLiter(s) Oral every 4 hours PRN  melatonin 3 milliGRAM(s) Oral at bedtime PRN  ondansetron Injectable 4 milliGRAM(s) IV Push every 8 hours PRN      REVIEW OF SYSTEMS  --------------------------------------------------------------------------------  Gen: No  fevers/chills  Skin: No rashes  Head/Eyes/Ears/Mouth: No headache; Normal hearing; Normal vision w/o blurriness  Respiratory: No dyspnea, cough, wheezing, hemoptysis  CV: No chest pain, PND, orthopnea  GI: No abdominal pain, diarrhea, constipation, nausea, vomiting  : No increased frequency, dysuria, hematuria, nocturia  MSK: No joint pain/swelling; no back pain; no edema  Neuro: No dizziness/lightheadedness, weakness, seizures, numbness, tingling      All other systems were reviewed and are negative, except as noted.    VITALS/PHYSICAL EXAM  --------------------------------------------------------------------------------  T(C): 36.6 (01-07-25 @ 12:04), Max: 37.6 (01-06-25 @ 18:57)  HR: 67 (01-07-25 @ 12:04) (67 - 86)  BP: 120/68 (01-07-25 @ 12:04) (120/68 - 160/81)  RR: 18 (01-07-25 @ 12:04) (18 - 21)  SpO2: 96% (01-07-25 @ 12:04) (96% - 98%)  Wt(kg): --  Height (cm): 165 (01-06-25 @ 19:40)  Weight (kg): 51 (01-06-25 @ 19:40)  BMI (kg/m2): 18.7 (01-06-25 @ 19:40)  BSA (m2): 1.55 (01-06-25 @ 19:40)      01-06-25 @ 07:01  -  01-07-25 @ 07:00  --------------------------------------------------------  IN: 320 mL / OUT: 0 mL / NET: 320 mL    01-07-25 @ 07:01  -  01-07-25 @ 13:35  --------------------------------------------------------  IN: 320 mL / OUT: 0 mL / NET: 320 mL      Physical Exam:  	Gen: NAD, well-appearing  	HEENT: PERRL, supple neck, clear oropharynx  	Pulm: CTA B/L  	CV: RRR, S1S2;  	Back: No spinal or CVA tenderness  	Abd: +BS, soft, nontender/nondistended  	: No suprapubic tenderness                      Extremities: no bilateral LE edema noted.                       Neuro: No focal deficits, intact gait  	Skin: Warm, without rashes  	Vascular access:    LABS/STUDIES  --------------------------------------------------------------------------------              10.4   1.70  >-----------<  195      [01-07-25 @ 07:38]              31.2     129  |  94  |  13  ----------------------------<  264      [01-07-25 @ 07:38]  3.4   |  19  |  1.00        Ca     8.2     [01-07-25 @ 07:38]      Mg     2.1     [01-07-25 @ 07:38]      Phos  2.1     [01-07-25 @ 07:38]    TPro  6.7  /  Alb  2.8  /  TBili  0.4  /  DBili  x   /  AST  41  /  ALT  36  /  AlkPhos  220  [01-07-25 @ 07:38]    PT/INR: PT 10.7 , INR 0.93       [01-05-25 @ 18:24]  PTT: 28.5       [01-05-25 @ 18:24]    Uric acid 2.1      [01-06-25 @ 08:26]        [01-06-25 @ 08:26]  Serum Osmolality 250      [01-06-25 @ 02:22]    Creatinine Trend:  SCr 1.00 [01-07 @ 07:38]  SCr 0.84 [01-06 @ 08:26]  SCr 0.90 [01-05 @ 23:39]  SCr 0.98 [01-05 @ 18:24]      Urine Sodium 69      [01-06-25 @ 02:55]  Urine Osmolality 442      [01-05-25 @ 20:36]    Iron 16, TIBC 229, %sat 7      [01-07-25 @ 07:38]  TSH 2.93      [01-06-25 @ 06:11]  Lipid: chol 198, , HDL 59, LDL --      [01-07-25 @ 07:38]    HBsAg Nonreact      [01-06-25 @ 06:11]  HCV 0.50, Nonreact      [01-06-25 @ 06:11]  HIV Nonreact      [01-06-25 @ 06:11]

## 2025-01-08 LAB
ALBUMIN SERPL ELPH-MCNC: 3.1 G/DL — LOW (ref 3.3–5)
ALP SERPL-CCNC: 270 U/L — HIGH (ref 40–120)
ALT FLD-CCNC: 35 U/L — SIGNIFICANT CHANGE UP (ref 10–45)
ANION GAP SERPL CALC-SCNC: 12 MMOL/L — SIGNIFICANT CHANGE UP (ref 5–17)
ANION GAP SERPL CALC-SCNC: 13 MMOL/L — SIGNIFICANT CHANGE UP (ref 5–17)
ANION GAP SERPL CALC-SCNC: 9 MMOL/L — SIGNIFICANT CHANGE UP (ref 5–17)
AST SERPL-CCNC: 35 U/L — SIGNIFICANT CHANGE UP (ref 10–40)
BASOPHILS # BLD AUTO: 0 K/UL — SIGNIFICANT CHANGE UP (ref 0–0.2)
BASOPHILS NFR BLD AUTO: 0 % — SIGNIFICANT CHANGE UP (ref 0–2)
BILIRUB SERPL-MCNC: 0.3 MG/DL — SIGNIFICANT CHANGE UP (ref 0.2–1.2)
BUN SERPL-MCNC: 12 MG/DL — SIGNIFICANT CHANGE UP (ref 7–23)
BUN SERPL-MCNC: 12 MG/DL — SIGNIFICANT CHANGE UP (ref 7–23)
BUN SERPL-MCNC: 13 MG/DL — SIGNIFICANT CHANGE UP (ref 7–23)
CALCIUM SERPL-MCNC: 8.6 MG/DL — SIGNIFICANT CHANGE UP (ref 8.4–10.5)
CALCIUM SERPL-MCNC: 9 MG/DL — SIGNIFICANT CHANGE UP (ref 8.4–10.5)
CALCIUM SERPL-MCNC: 9 MG/DL — SIGNIFICANT CHANGE UP (ref 8.4–10.5)
CCP IGG SERPL-ACNC: 281 U/ML — HIGH
CHLORIDE SERPL-SCNC: 102 MMOL/L — SIGNIFICANT CHANGE UP (ref 96–108)
CHLORIDE SERPL-SCNC: 102 MMOL/L — SIGNIFICANT CHANGE UP (ref 96–108)
CHLORIDE SERPL-SCNC: 103 MMOL/L — SIGNIFICANT CHANGE UP (ref 96–108)
CO2 SERPL-SCNC: 19 MMOL/L — LOW (ref 22–31)
CO2 SERPL-SCNC: 19 MMOL/L — LOW (ref 22–31)
CO2 SERPL-SCNC: 20 MMOL/L — LOW (ref 22–31)
CREAT SERPL-MCNC: 1 MG/DL — SIGNIFICANT CHANGE UP (ref 0.5–1.3)
CREAT SERPL-MCNC: 1.03 MG/DL — SIGNIFICANT CHANGE UP (ref 0.5–1.3)
CREAT SERPL-MCNC: 1.04 MG/DL — SIGNIFICANT CHANGE UP (ref 0.5–1.3)
EBV DNA SERPL NAA+PROBE-ACNC: SIGNIFICANT CHANGE UP IU/ML
EBVPCR LOG: SIGNIFICANT CHANGE UP LOG10IU/ML
EGFR: 54 ML/MIN/1.73M2 — LOW
EGFR: 55 ML/MIN/1.73M2 — LOW
EGFR: 57 ML/MIN/1.73M2 — LOW
EOSINOPHIL # BLD AUTO: 0.02 K/UL — SIGNIFICANT CHANGE UP (ref 0–0.5)
EOSINOPHIL NFR BLD AUTO: 0.9 % — SIGNIFICANT CHANGE UP (ref 0–6)
FERRITIN SERPL-MCNC: 192 NG/ML — SIGNIFICANT CHANGE UP (ref 13–330)
GLUCOSE SERPL-MCNC: 109 MG/DL — HIGH (ref 70–99)
GLUCOSE SERPL-MCNC: 122 MG/DL — HIGH (ref 70–99)
GLUCOSE SERPL-MCNC: 123 MG/DL — HIGH (ref 70–99)
HBV CORE AB SER-ACNC: SIGNIFICANT CHANGE UP
HCT VFR BLD CALC: 32.7 % — LOW (ref 34.5–45)
HGB BLD-MCNC: 10.7 G/DL — LOW (ref 11.5–15.5)
LYMPHOCYTES # BLD AUTO: 0.94 K/UL — LOW (ref 1–3.3)
LYMPHOCYTES # BLD AUTO: 51.8 % — HIGH (ref 13–44)
MAGNESIUM SERPL-MCNC: 2.2 MG/DL — SIGNIFICANT CHANGE UP (ref 1.6–2.6)
MANUAL SMEAR VERIFICATION: SIGNIFICANT CHANGE UP
MCHC RBC-ENTMCNC: 27.7 PG — SIGNIFICANT CHANGE UP (ref 27–34)
MCHC RBC-ENTMCNC: 32.7 G/DL — SIGNIFICANT CHANGE UP (ref 32–36)
MCV RBC AUTO: 84.7 FL — SIGNIFICANT CHANGE UP (ref 80–100)
MONOCYTES # BLD AUTO: 0.83 K/UL — SIGNIFICANT CHANGE UP (ref 0–0.9)
MONOCYTES NFR BLD AUTO: 45.5 % — HIGH (ref 2–14)
NEUTROPHILS # BLD AUTO: 0.03 K/UL — LOW (ref 1.8–7.4)
NEUTROPHILS NFR BLD AUTO: 0.9 % — LOW (ref 43–77)
NEUTS BAND # BLD: 0.9 % — SIGNIFICANT CHANGE UP (ref 0–8)
PHOSPHATE SERPL-MCNC: 2.7 MG/DL — SIGNIFICANT CHANGE UP (ref 2.5–4.5)
PLAT MORPH BLD: NORMAL — SIGNIFICANT CHANGE UP
PLATELET # BLD AUTO: 200 K/UL — SIGNIFICANT CHANGE UP (ref 150–400)
POTASSIUM SERPL-MCNC: 4.1 MMOL/L — SIGNIFICANT CHANGE UP (ref 3.5–5.3)
POTASSIUM SERPL-MCNC: 4.2 MMOL/L — SIGNIFICANT CHANGE UP (ref 3.5–5.3)
POTASSIUM SERPL-MCNC: 4.3 MMOL/L — SIGNIFICANT CHANGE UP (ref 3.5–5.3)
POTASSIUM SERPL-SCNC: 4.1 MMOL/L — SIGNIFICANT CHANGE UP (ref 3.5–5.3)
POTASSIUM SERPL-SCNC: 4.2 MMOL/L — SIGNIFICANT CHANGE UP (ref 3.5–5.3)
POTASSIUM SERPL-SCNC: 4.3 MMOL/L — SIGNIFICANT CHANGE UP (ref 3.5–5.3)
PROT SERPL-MCNC: 7.1 G/DL — SIGNIFICANT CHANGE UP (ref 6–8.3)
RBC # BLD: 3.86 M/UL — SIGNIFICANT CHANGE UP (ref 3.8–5.2)
RBC # FLD: 14 % — SIGNIFICANT CHANGE UP (ref 10.3–14.5)
RBC BLD AUTO: SIGNIFICANT CHANGE UP
RF+CCP IGG SER-IMP: POSITIVE
RHEUMATOID FACT SERPL-ACNC: 49 IU/ML — HIGH (ref 0–13)
SODIUM SERPL-SCNC: 132 MMOL/L — LOW (ref 135–145)
SODIUM SERPL-SCNC: 133 MMOL/L — LOW (ref 135–145)
SODIUM SERPL-SCNC: 134 MMOL/L — LOW (ref 135–145)
TM INTERPRETATION: SIGNIFICANT CHANGE UP
WBC # BLD: 1.82 K/UL — LOW (ref 3.8–10.5)
WBC # FLD AUTO: 1.82 K/UL — LOW (ref 3.8–10.5)

## 2025-01-08 PROCEDURE — 99232 SBSQ HOSP IP/OBS MODERATE 35: CPT | Mod: GC

## 2025-01-08 RX ORDER — SODIUM CHLORIDE 9 MG/ML
1000 INJECTION, SOLUTION INTRAVENOUS
Refills: 0 | Status: DISCONTINUED | OUTPATIENT
Start: 2025-01-08 | End: 2025-01-08

## 2025-01-08 RX ADMIN — PIPERACILLIN AND TAZOBACTAM 25 GRAM(S): 3; .375 INJECTION, POWDER, LYOPHILIZED, FOR SOLUTION INTRAVENOUS at 15:26

## 2025-01-08 RX ADMIN — PIPERACILLIN AND TAZOBACTAM 25 GRAM(S): 3; .375 INJECTION, POWDER, LYOPHILIZED, FOR SOLUTION INTRAVENOUS at 22:02

## 2025-01-08 RX ADMIN — HEPARIN SODIUM 5000 UNIT(S): 1000 INJECTION, SOLUTION INTRAVENOUS; SUBCUTANEOUS at 05:32

## 2025-01-08 RX ADMIN — PIPERACILLIN AND TAZOBACTAM 25 GRAM(S): 3; .375 INJECTION, POWDER, LYOPHILIZED, FOR SOLUTION INTRAVENOUS at 05:32

## 2025-01-08 NOTE — DIETITIAN INITIAL EVALUATION ADULT - OTHER CALCULATIONS
Estimated protein-energy needs calculated using IBW of 125 pounds with consideration for BMI <19.  Defer fluid calculations to the medical team.

## 2025-01-08 NOTE — DIETITIAN INITIAL EVALUATION ADULT - PROBLEM SELECTOR PLAN 2
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

## 2025-01-08 NOTE — PROGRESS NOTE ADULT - PROBLEM SELECTOR PLAN 2
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

## 2025-01-08 NOTE — DIETITIAN INITIAL EVALUATION ADULT - PERSON TAUGHT/METHOD
Educated on the importance of meeting adequate protein-energy needs. Encouraged consumption of HBV foods and prioritizing protein foods first at meal times. Encouraged consumption of oral nutrition supplement to optimize protein-energy intake. Encouraged small, frequent meals. Emphasized importance of consuming nutrient dense foods and snacks to optimize energy and protein intake. Reviewed fluid restriction, and items that count towards restriction. Obtained food preferences, will honor as able. Pt and son aware RD remains available./verbal instruction/patient instructed/son instructed

## 2025-01-08 NOTE — DIETITIAN INITIAL EVALUATION ADULT - NSFNSADHERENCEPTAFT_GEN_A_CORE
Adrian Riggins), Internal Medicine  [address] [place] [address]  Phone: [phone]  Fax: [phone] Prior to admission, pt's son stated pt follows a regular diet, no restrictions. Son reported decreased appetite/po intake for "a few days" prior to admission in setting of recent fever. Stated prior to this, pt ate well at baseline.

## 2025-01-08 NOTE — DIETITIAN INITIAL EVALUATION ADULT - PROBLEM SELECTOR PROBLEM 2
Odomzo Counseling- I discussed with the patient the risks of Odomzo including but not limited to nausea, vomiting, diarrhea, constipation, weight loss, changes in the sense of taste, decreased appetite, muscle spasms, and hair loss.  The patient verbalized understanding of the proper use and possible adverse effects of Odomzo.  All of the patient's questions and concerns were addressed. Griseofulvin Pregnancy And Lactation Text: This medication is Pregnancy Category X and is known to cause serious birth defects. It is unknown if this medication is excreted in breast milk but breast feeding should be avoided. Glycopyrrolate Counseling:  I discussed with the patient the risks of glycopyrrolate including but not limited to skin rash, drowsiness, dry mouth, difficulty urinating, and blurred vision. Dupixent Counseling: I discussed with the patient the risks of dupilumab including but not limited to eye infection and irritation, cold sores, injection site reactions, worsening of asthma, allergic reactions and increased risk of parasitic infection.  Live vaccines should be avoided while taking dupilumab. Dupilumab will also interact with certain medications such as warfarin and cyclosporine. The patient understands that monitoring is required and they must alert us or the primary physician if symptoms of infection or other concerning signs are noted. Odomzo Pregnancy And Lactation Text: This medication is Pregnancy Category X and is absolutely contraindicated during pregnancy. It is unknown if it is excreted in breast milk. Arava Counseling:  Patient counseled regarding adverse effects of Arava including but not limited to nausea, vomiting, abnormalities in liver function tests. Patients may develop mouth sores, rash, diarrhea, and abnormalities in blood counts. The patient understands that monitoring is required including LFTs and blood counts.  There is a rare possibility of scarring of the liver and lung problems that can occur when taking methotrexate. Persistent nausea, loss of appetite, pale stools, dark urine, cough, and shortness of breath should be reported immediately. Patient advised to discontinue Arava treatment and consult with a physician prior to attempting conception. The patient will have to undergo a treatment to eliminate Arava from the body prior to conception. Hyponatremia Zyclara Pregnancy And Lactation Text: This medication is Pregnancy Category C. It is unknown if this medication is excreted in breast milk. Quinolones Counseling:  I discussed with the patient the risks of fluoroquinolones including but not limited to GI upset, allergic reaction, drug rash, diarrhea, dizziness, photosensitivity, yeast infections, liver function test abnormalities, tendonitis/tendon rupture. Clindamycin Pregnancy And Lactation Text: This medication can be used in pregnancy if certain situations. Clindamycin is also present in breast milk. Valtrex Counseling: I discussed with the patient the risks of valacyclovir including but not limited to kidney damage, nausea, vomiting and severe allergy.  The patient understands that if the infection seems to be worsening or is not improving, they are to call. Dapsone Counseling: I discussed with the patient the risks of dapsone including but not limited to hemolytic anemia, agranulocytosis, rashes, methemoglobinemia, kidney failure, peripheral neuropathy, headaches, GI upset, and liver toxicity.  Patients who start dapsone require monitoring including baseline LFTs and weekly CBCs for the first month, then every month thereafter.  The patient verbalized understanding of the proper use and possible adverse effects of dapsone.  All of the patient's questions and concerns were addressed. Xolair Counseling:  Patient informed of potential adverse effects including but not limited to fever, muscle aches, rash and allergic reactions.  The patient verbalized understanding of the proper use and possible adverse effects of Xolair.  All of the patient's questions and concerns were addressed. Rituxan Counseling:  I discussed with the patient the risks of Rituxan infusions. Side effects can include infusion reactions, severe drug rashes including mucocutaneous reactions, reactivation of latent hepatitis and other infections and rarely progressive multifocal leukoencephalopathy.  All of the patient's questions and concerns were addressed. Clofazimine Pregnancy And Lactation Text: This medication is Pregnancy Category C and isn't considered safe during pregnancy. It is excreted in breast milk. Erythromycin Counseling:  I discussed with the patient the risks of erythromycin including but not limited to GI upset, allergic reaction, drug rash, diarrhea, increase in liver enzymes, and yeast infections. Stelara Pregnancy And Lactation Text: This medication is Pregnancy Category B and is considered safe during pregnancy. It is unknown if this medication is excreted in breast milk. Glycopyrrolate Pregnancy And Lactation Text: This medication is Pregnancy Category B and is considered safe during pregnancy. It is unknown if it is excreted breast milk. Albendazole Pregnancy And Lactation Text: This medication is Pregnancy Category C and it isn't known if it is safe during pregnancy. It is also excreted in breast milk. Hydroxychloroquine Pregnancy And Lactation Text: This medication has been shown to cause fetal harm but it isn't assigned a Pregnancy Risk Category. There are small amounts excreted in breast milk. Otezla Counseling: The side effects of Otezla were discussed with the patient, including but not limited to worsening or new depression, weight loss, diarrhea, nausea, upper respiratory tract infection, and headache. Patient instructed to call the office should any adverse effect occur.  The patient verbalized understanding of the proper use and possible adverse effects of Otezla.  All the patient's questions and concerns were addressed. Terbinafine Counseling: Patient counseling regarding adverse effects of terbinafine including but not limited to headache, diarrhea, rash, upset stomach, liver function test abnormalities, itching, taste/smell disturbance, nausea, abdominal pain, and flatulence.  There is a rare possibility of liver failure that can occur when taking terbinafine.  The patient understands that a baseline LFT and kidney function test may be required. The patient verbalized understanding of the proper use and possible adverse effects of terbinafine.  All of the patient's questions and concerns were addressed. Humira Counseling:  I discussed with the patient the risks of adalimumab including but not limited to myelosuppression, immunosuppression, autoimmune hepatitis, demyelinating diseases, lymphoma, and serious infections.  The patient understands that monitoring is required including a PPD at baseline and must alert us or the primary physician if symptoms of infection or other concerning signs are noted. Picato Counseling:  I discussed with the patient the risks of Picato including but not limited to erythema, scaling, itching, weeping, crusting, and pain. Benzoyl Peroxide Pregnancy And Lactation Text: This medication is Pregnancy Category C. It is unknown if benzoyl peroxide is excreted in breast milk. Terbinafine Pregnancy And Lactation Text: This medication is Pregnancy Category B and is considered safe during pregnancy. It is also excreted in breast milk and breast feeding isn't recommended. Solaraze Pregnancy And Lactation Text: This medication is Pregnancy Category B and is considered safe. There is some data to suggest avoiding during the third trimester. It is unknown if this medication is excreted in breast milk. Hydroxyzine Pregnancy And Lactation Text: This medication is not safe during pregnancy and should not be taken. It is also excreted in breast milk and breast feeding isn't recommended. Azathioprine Counseling:  I discussed with the patient the risks of azathioprine including but not limited to myelosuppression, immunosuppression, hepatotoxicity, lymphoma, and infections.  The patient understands that monitoring is required including baseline LFTs, Creatinine, possible TPMP genotyping and weekly CBCs for the first month and then every 2 weeks thereafter.  The patient verbalized understanding of the proper use and possible adverse effects of azathioprine.  All of the patient's questions and concerns were addressed. Topical Sulfur Applications Counseling: Topical Sulfur Counseling: Patient counseled that this medication may cause skin irritation or allergic reactions.  In the event of skin irritation, the patient was advised to reduce the amount of the drug applied or use it less frequently.   The patient verbalized understanding of the proper use and possible adverse effects of topical sulfur application.  All of the patient's questions and concerns were addressed. Clindamycin Counseling: I counseled the patient regarding use of clindamycin as an antibiotic for prophylactic and/or therapeutic purposes. Clindamycin is active against numerous classes of bacteria, including skin bacteria. Side effects may include nausea, diarrhea, gastrointestinal upset, rash, hives, yeast infections, and in rare cases, colitis. Detail Level: Detailed Use Enhanced Medication Counseling?: No Cellcept Pregnancy And Lactation Text: This medication is Pregnancy Category D and isn't considered safe during pregnancy. It is unknown if this medication is excreted in breast milk. Spironolactone Counseling: Patient advised regarding risks of diarrhea, abdominal pain, hyperkalemia, birth defects (for female patients), liver toxicity and renal toxicity. The patient may need blood work to monitor liver and kidney function and potassium levels while on therapy. The patient verbalized understanding of the proper use and possible adverse effects of spironolactone.  All of the patient's questions and concerns were addressed. Sarecycline Counseling: Patient advised regarding possible photosensitivity and discoloration of the teeth, skin, lips, tongue and gums.  Patient instructed to avoid sunlight, if possible.  When exposed to sunlight, patients should wear protective clothing, sunglasses, and sunscreen.  The patient was instructed to call the office immediately if the following severe adverse effects occur:  hearing changes, easy bruising/bleeding, severe headache, or vision changes.  The patient verbalized understanding of the proper use and possible adverse effects of sarecycline.  All of the patient's questions and concerns were addressed. Taltz Counseling: I discussed with the patient the risks of ixekizumab including but not limited to immunosuppression, serious infections, worsening of inflammatory bowel disease and drug reactions.  The patient understands that monitoring is required including a PPD at baseline and must alert us or the primary physician if symptoms of infection or other concerning signs are noted. Hydroxychloroquine Counseling:  I discussed with the patient that a baseline ophthalmologic exam is needed at the start of therapy and every year thereafter while on therapy. A CBC may also be warranted for monitoring.  The side effects of this medication were discussed with the patient, including but not limited to agranulocytosis, aplastic anemia, seizures, rashes, retinopathy, and liver toxicity. Patient instructed to call the office should any adverse effect occur.  The patient verbalized understanding of the proper use and possible adverse effects of Plaquenil.  All the patient's questions and concerns were addressed. Enbrel Counseling:  I discussed with the patient the risks of etanercept including but not limited to myelosuppression, immunosuppression, autoimmune hepatitis, demyelinating diseases, lymphoma, and infections.  The patient understands that monitoring is required including a PPD at baseline and must alert us or the primary physician if symptoms of infection or other concerning signs are noted. Bactrim Counseling:  I discussed with the patient the risks of sulfa antibiotics including but not limited to GI upset, allergic reaction, drug rash, diarrhea, dizziness, photosensitivity, and yeast infections.  Rarely, more serious reactions can occur including but not limited to aplastic anemia, agranulocytosis, methemoglobinemia, blood dyscrasias, liver or kidney failure, lung infiltrates or desquamative/blistering drug rashes. Methotrexate Pregnancy And Lactation Text: This medication is Pregnancy Category X and is known to cause fetal harm. This medication is excreted in breast milk. Cephalexin Counseling: I counseled the patient regarding use of cephalexin as an antibiotic for prophylactic and/or therapeutic purposes. Cephalexin (commonly prescribed under brand name Keflex) is a cephalosporin antibiotic which is active against numerous classes of bacteria, including most skin bacteria. Side effects may include nausea, diarrhea, gastrointestinal upset, rash, hives, yeast infections, and in rare cases, hepatitis, kidney disease, seizures, fever, confusion, neurologic symptoms, and others. Patients with severe allergies to penicillin medications are cautioned that there is about a 10% incidence of cross-reactivity with cephalosporins. When possible, patients with penicillin allergies should use alternatives to cephalosporins for antibiotic therapy. Acitretin Pregnancy And Lactation Text: This medication is Pregnancy Category X and should not be given to women who are pregnant or may become pregnant in the future. This medication is excreted in breast milk. Simponi Counseling:  I discussed with the patient the risks of golimumab including but not limited to myelosuppression, immunosuppression, autoimmune hepatitis, demyelinating diseases, lymphoma, and serious infections.  The patient understands that monitoring is required including a PPD at baseline and must alert us or the primary physician if symptoms of infection or other concerning signs are noted. Benzoyl Peroxide Counseling: Patient counseled that medicine may cause skin irritation and bleach clothing.  In the event of skin irritation, the patient was advised to reduce the amount of the drug applied or use it less frequently.   The patient verbalized understanding of the proper use and possible adverse effects of benzoyl peroxide.  All of the patient's questions and concerns were addressed. Imiquimod Counseling:  I discussed with the patient the risks of imiquimod including but not limited to erythema, scaling, itching, weeping, crusting, and pain.  Patient understands that the inflammatory response to imiquimod is variable from person to person and was educated regarded proper titration schedule.  If flu-like symptoms develop, patient knows to discontinue the medication and contact us. Zyclara Counseling:  I discussed with the patient the risks of imiquimod including but not limited to erythema, scaling, itching, weeping, crusting, and pain.  Patient understands that the inflammatory response to imiquimod is variable from person to person and was educated regarded proper titration schedule.  If flu-like symptoms develop, patient knows to discontinue the medication and contact us. Skyrizi Counseling: I discussed with the patient the risks of risankizumab-rzaa including but not limited to immunosuppression, and serious infections.  The patient understands that monitoring is required including a PPD at baseline and must alert us or the primary physician if symptoms of infection or other concerning signs are noted. Tetracycline Counseling: Patient counseled regarding possible photosensitivity and increased risk for sunburn.  Patient instructed to avoid sunlight, if possible.  When exposed to sunlight, patients should wear protective clothing, sunglasses, and sunscreen.  The patient was instructed to call the office immediately if the following severe adverse effects occur:  hearing changes, easy bruising/bleeding, severe headache, or vision changes.  The patient verbalized understanding of the proper use and possible adverse effects of tetracycline.  All of the patient's questions and concerns were addressed. Patient understands to avoid pregnancy while on therapy due to potential birth defects. Tazorac Counseling:  Patient advised that medication is irritating and drying.  Patient may need to apply sparingly and wash off after an hour before eventually leaving it on overnight.  The patient verbalized understanding of the proper use and possible adverse effects of tazorac.  All of the patient's questions and concerns were addressed. Isotretinoin Counseling: Patient should get monthly blood tests, not donate blood, not drive at night if vision affected, not share medication, and not undergo elective surgery for 6 months after tx completed. Side effects reviewed, pt to contact office should one occur. 5-Fu Pregnancy And Lactation Text: This medication is Pregnancy Category X and contraindicated in pregnancy and in women who may become pregnant. It is unknown if this medication is excreted in breast milk. Colchicine Counseling:  Patient counseled regarding adverse effects including but not limited to stomach upset (nausea, vomiting, stomach pain, or diarrhea).  Patient instructed to limit alcohol consumption while taking this medication.  Colchicine may reduce blood counts especially with prolonged use.  The patient understands that monitoring of kidney function and blood counts may be required, especially at baseline. The patient verbalized understanding of the proper use and possible adverse effects of colchicine.  All of the patient's questions and concerns were addressed. Stelara Counseling:  I discussed with the patient the risks of ustekinumab including but not limited to immunosuppression, malignancy, posterior leukoencephalopathy syndrome, and serious infections.  The patient understands that monitoring is required including a PPD at baseline and must alert us or the primary physician if symptoms of infection or other concerning signs are noted. Tremfya Counseling: I discussed with the patient the risks of guselkumab including but not limited to immunosuppression, serious infections, worsening of inflammatory bowel disease and drug reactions.  The patient understands that monitoring is required including a PPD at baseline and must alert us or the primary physician if symptoms of infection or other concerning signs are noted. Siliq Pregnancy And Lactation Text: The risk during pregnancy and breastfeeding is uncertain with this medication. Itraconazole Pregnancy And Lactation Text: This medication is Pregnancy Category C and it isn't know if it is safe during pregnancy. It is also excreted in breast milk. Sarecycline Pregnancy And Lactation Text: This medication is Pregnancy Category D and not consider safe during pregnancy. It is also excreted in breast milk. Protopic Pregnancy And Lactation Text: This medication is Pregnancy Category C. It is unknown if this medication is excreted in breast milk when applied topically. Doxepin Counseling:  Patient advised that the medication is sedating and not to drive a car after taking this medication. Patient informed of potential adverse effects including but not limited to dry mouth, urinary retention, and blurry vision.  The patient verbalized understanding of the proper use and possible adverse effects of doxepin.  All of the patient's questions and concerns were addressed. Methotrexate Counseling:  Patient counseled regarding adverse effects of methotrexate including but not limited to nausea, vomiting, abnormalities in liver function tests. Patients may develop mouth sores, rash, diarrhea, and abnormalities in blood counts. The patient understands that monitoring is required including LFT's and blood counts.  There is a rare possibility of scarring of the liver and lung problems that can occur when taking methotrexate. Persistent nausea, loss of appetite, pale stools, dark urine, cough, and shortness of breath should be reported immediately. Patient advised to discontinue methotrexate treatment at least three months before attempting to become pregnant.  I discussed the need for folate supplements while taking methotrexate.  These supplements can decrease side effects during methotrexate treatment. The patient verbalized understanding of the proper use and possible adverse effects of methotrexate.  All of the patient's questions and concerns were addressed. Prednisone Pregnancy And Lactation Text: This medication is Pregnancy Category C and it isn't know if it is safe during pregnancy. This medication is excreted in breast milk. Drysol Pregnancy And Lactation Text: This medication is considered safe during pregnancy and breast feeding. Azithromycin Counseling:  I discussed with the patient the risks of azithromycin including but not limited to GI upset, allergic reaction, drug rash, diarrhea, and yeast infections. Cyclophosphamide Counseling:  I discussed with the patient the risks of cyclophosphamide including but not limited to hair loss, hormonal abnormalities, decreased fertility, abdominal pain, diarrhea, nausea and vomiting, bone marrow suppression and infection. The patient understands that monitoring is required while taking this medication. Azithromycin Pregnancy And Lactation Text: This medication is considered safe during pregnancy and is also secreted in breast milk. Fluconazole Counseling:  Patient counseled regarding adverse effects of fluconazole including but not limited to headache, diarrhea, nausea, upset stomach, liver function test abnormalities, taste disturbance, and stomach pain.  There is a rare possibility of liver failure that can occur when taking fluconazole.  The patient understands that monitoring of LFTs and kidney function test may be required, especially at baseline. The patient verbalized understanding of the proper use and possible adverse effects of fluconazole.  All of the patient's questions and concerns were addressed. High Dose Vitamin A Counseling: Side effects reviewed, pt to contact office should one occur. Eucrisa Counseling: Patient may experience a mild burning sensation during topical application. Eucrisa is not approved in children less than 2 years of age. Infliximab Counseling:  I discussed with the patient the risks of infliximab including but not limited to myelosuppression, immunosuppression, autoimmune hepatitis, demyelinating diseases, lymphoma, and serious infections.  The patient understands that monitoring is required including a PPD at baseline and must alert us or the primary physician if symptoms of infection or other concerning signs are noted. Bexarotene Counseling:  I discussed with the patient the risks of bexarotene including but not limited to hair loss, dry lips/skin/eyes, liver abnormalities, hyperlipidemia, pancreatitis, depression/suicidal ideation, photosensitivity, drug rash/allergic reactions, hypothyroidism, anemia, leukopenia, infection, cataracts, and teratogenicity.  Patient understands that they will need regular blood tests to check lipid profile, liver function tests, white blood cell count, thyroid function tests and pregnancy test if applicable. Topical Retinoid counseling:  Patient advised to apply a pea-sized amount only at bedtime and wait 30 minutes after washing their face before applying.  If too drying, patient may add a non-comedogenic moisturizer. The patient verbalized understanding of the proper use and possible adverse effects of retinoids.  All of the patient's questions and concerns were addressed. Cyclosporine Counseling:  I discussed with the patient the risks of cyclosporine including but not limited to hypertension, gingival hyperplasia,myelosuppression, immunosuppression, liver damage, kidney damage, neurotoxicity, lymphoma, and serious infections. The patient understands that monitoring is required including baseline blood pressure, CBC, CMP, lipid panel and uric acid, and then 1-2 times monthly CMP and blood pressure. Minoxidil Pregnancy And Lactation Text: This medication has not been assigned a Pregnancy Risk Category but animal studies failed to show danger with the topical medication. It is unknown if the medication is excreted in breast milk. Otezla Pregnancy And Lactation Text: This medication is Pregnancy Category C and it isn't known if it is safe during pregnancy. It is unknown if it is excreted in breast milk. Dupixent Pregnancy And Lactation Text: This medication likely crosses the placenta but the risk for the fetus is uncertain. This medication is excreted in breast milk. Xelbibiz Pregnancy And Lactation Text: This medication is Pregnancy Category D and is not considered safe during pregnancy.  The risk during breast feeding is also uncertain. Griseofulvin Counseling:  I discussed with the patient the risks of griseofulvin including but not limited to photosensitivity, cytopenia, liver damage, nausea/vomiting and severe allergy.  The patient understands that this medication is best absorbed when taken with a fatty meal (e.g., ice cream or french fries). Itraconazole Counseling:  I discussed with the patient the risks of itraconazole including but not limited to liver damage, nausea/vomiting, neuropathy, and severe allergy.  The patient understands that this medication is best absorbed when taken with acidic beverages such as non-diet cola or ginger ale.  The patient understands that monitoring is required including baseline LFTs and repeat LFTs at intervals.  The patient understands that they are to contact us or the primary physician if concerning signs are noted. Cyclophosphamide Pregnancy And Lactation Text: This medication is Pregnancy Category D and it isn't considered safe during pregnancy. This medication is excreted in breast milk. Birth Control Pills Counseling: Birth Control Pill Counseling: I discussed with the patient the potential side effects of OCPs including but not limited to increased risk of stroke, heart attack, thrombophlebitis, deep venous thrombosis, hepatic adenomas, breast changes, GI upset, headaches, and depression.  The patient verbalized understanding of the proper use and possible adverse effects of OCPs. All of the patient's questions and concerns were addressed. Valtrex Pregnancy And Lactation Text: this medication is Pregnancy Category B and is considered safe during pregnancy. This medication is not directly found in breast milk but it's metabolite acyclovir is present. Dapsone Pregnancy And Lactation Text: This medication is Pregnancy Category C and is not considered safe during pregnancy or breast feeding. Isotretinoin Pregnancy And Lactation Text: This medication is Pregnancy Category X and is considered extremely dangerous during pregnancy. It is unknown if it is excreted in breast milk. Siliq Counseling:  I discussed with the patient the risks of Siliq including but not limited to new or worsening depression, suicidal thoughts and behavior, immunosuppression, malignancy, posterior leukoencephalopathy syndrome, and serious infections.  The patient understands that monitoring is required including a PPD at baseline and must alert us or the primary physician if symptoms of infection or other concerning signs are noted. There is also a special program designed to monitor depression which is required with Siliq. Minocycline Counseling: Patient advised regarding possible photosensitivity and discoloration of the teeth, skin, lips, tongue and gums.  Patient instructed to avoid sunlight, if possible.  When exposed to sunlight, patients should wear protective clothing, sunglasses, and sunscreen.  The patient was instructed to call the office immediately if the following severe adverse effects occur:  hearing changes, easy bruising/bleeding, severe headache, or vision changes.  The patient verbalized understanding of the proper use and possible adverse effects of minocycline.  All of the patient's questions and concerns were addressed. Clofazimine Counseling:  I discussed with the patient the risks of clofazimine including but not limited to skin and eye pigmentation, liver damage, nausea/vomiting, gastrointestinal bleeding and allergy. Erythromycin Pregnancy And Lactation Text: This medication is Pregnancy Category B and is considered safe during pregnancy. It is also excreted in breast milk. Cimetidine Counseling:  I discussed with the patient the risks of Cimetidine including but not limited to gynecomastia, headache, diarrhea, nausea, drowsiness, arrhythmias, pancreatitis, skin rashes, psychosis, bone marrow suppression and kidney toxicity. Ivermectin Counseling:  Patient instructed to take medication on an empty stomach with a full glass of water.  Patient informed of potential adverse effects including but not limited to nausea, diarrhea, dizziness, itching, and swelling of the extremities or lymph nodes.  The patient verbalized understanding of the proper use and possible adverse effects of ivermectin.  All of the patient's questions and concerns were addressed. Oxybutynin Pregnancy And Lactation Text: This medication is Pregnancy Category B and is considered safe during pregnancy. It is unknown if it is excreted in breast milk. Tazorac Pregnancy And Lactation Text: This medication is not safe during pregnancy. It is unknown if this medication is excreted in breast milk. Cimzia Pregnancy And Lactation Text: This medication crosses the placenta but can be considered safe in certain situations. Cimzia may be excreted in breast milk. Hydroxyzine Counseling: Patient advised that the medication is sedating and not to drive a car after taking this medication.  Patient informed of potential adverse effects including but not limited to dry mouth, urinary retention, and blurry vision.  The patient verbalized understanding of the proper use and possible adverse effects of hydroxyzine.  All of the patient's questions and concerns were addressed. Doxycycline Pregnancy And Lactation Text: This medication is Pregnancy Category D and not consider safe during pregnancy. It is also excreted in breast milk but is considered safe for shorter treatment courses. Metronidazole Pregnancy And Lactation Text: This medication is Pregnancy Category B and considered safe during pregnancy.  It is also excreted in breast milk. Sski Pregnancy And Lactation Text: This medication is Pregnancy Category D and isn't considered safe during pregnancy. It is excreted in breast milk. Bactrim Pregnancy And Lactation Text: This medication is Pregnancy Category D and is known to cause fetal risk.  It is also excreted in breast milk. Carac Counseling:  I discussed with the patient the risks of Carac including but not limited to erythema, scaling, itching, weeping, crusting, and pain. Hydroquinone Counseling:  Patient advised that medication may result in skin irritation, lightening (hypopigmentation), dryness, and burning.  In the event of skin irritation, the patient was advised to reduce the amount of the drug applied or use it less frequently.  Rarely, spots that are treated with hydroquinone can become darker (pseudoochronosis).  Should this occur, patient instructed to stop medication and call the office. The patient verbalized understanding of the proper use and possible adverse effects of hydroquinone.  All of the patient's questions and concerns were addressed. Topical Clindamycin Counseling: Patient counseled that this medication may cause skin irritation or allergic reactions.  In the event of skin irritation, the patient was advised to reduce the amount of the drug applied or use it less frequently.   The patient verbalized understanding of the proper use and possible adverse effects of clindamycin.  All of the patient's questions and concerns were addressed. Solaraze Counseling:  I discussed with the patient the risks of Solaraze including but not limited to erythema, scaling, itching, weeping, crusting, and pain. Metronidazole Counseling:  I discussed with the patient the risks of metronidazole including but not limited to seizures, nausea/vomiting, a metallic taste in the mouth, nausea/vomiting and severe allergy. Xolair Pregnancy And Lactation Text: This medication is Pregnancy Category B and is considered safe during pregnancy. This medication is excreted in breast milk. Albendazole Counseling:  I discussed with the patient the risks of albendazole including but not limited to cytopenia, kidney damage, nausea/vomiting and severe allergy.  The patient understands that this medication is being used in an off-label manner. Xeljanz Counseling: I discussed with the patient the risks of Xeljanz therapy including increased risk of infection, liver issues, headache, diarrhea, or cold symptoms. Live vaccines should be avoided. They were instructed to call if they have any problems. Ilumya Counseling: I discussed with the patient the risks of tildrakizumab including but not limited to immunosuppression, malignancy, posterior leukoencephalopathy syndrome, and serious infections.  The patient understands that monitoring is required including a PPD at baseline and must alert us or the primary physician if symptoms of infection or other concerning signs are noted. Topical Sulfur Applications Pregnancy And Lactation Text: This medication is Pregnancy Category C and has an unknown safety profile during pregnancy. It is unknown if this topical medication is excreted in breast milk. Spironolactone Pregnancy And Lactation Text: This medication can cause feminization of the male fetus and should be avoided during pregnancy. The active metabolite is also found in breast milk. Thalidomide Counseling: I discussed with the patient the risks of thalidomide including but not limited to birth defects, anxiety, weakness, chest pain, dizziness, cough and severe allergy. High Dose Vitamin A Pregnancy And Lactation Text: High dose vitamin A therapy is contraindicated during pregnancy and breast feeding. Rituxan Pregnancy And Lactation Text: This medication is Pregnancy Category C and it isn't know if it is safe during pregnancy. It is unknown if this medication is excreted in breast milk but similar antibodies are known to be excreted. Elidel Counseling: Patient may experience a mild burning sensation during topical application. Elidel is not approved in children less than 2 years of age. There have been case reports of hematologic and skin malignancies in patients using topical calcineurin inhibitors although causality is questionable. Nsaids Pregnancy And Lactation Text: These medications are considered safe up to 30 weeks gestation. It is excreted in breast milk. Oxybutynin Counseling:  I discussed with the patient the risks of oxybutynin including but not limited to skin rash, drowsiness, dry mouth, difficulty urinating, and blurred vision. Gabapentin Counseling: I discussed with the patient the risks of gabapentin including but not limited to dizziness, somnolence, fatigue and ataxia. Cephalexin Pregnancy And Lactation Text: This medication is Pregnancy Category B and considered safe during pregnancy.  It is also excreted in breast milk but can be used safely for shorter doses. Acitretin Counseling:  I discussed with the patient the risks of acitretin including but not limited to hair loss, dry lips/skin/eyes, liver damage, hyperlipidemia, depression/suicidal ideation, photosensitivity.  Serious rare side effects can include but are not limited to pancreatitis, pseudotumor cerebri, bony changes, clot formation/stroke/heart attack.  Patient understands that alcohol is contraindicated since it can result in liver toxicity and significantly prolong the elimination of the drug by many years. Prednisone Counseling:  I discussed with the patient the risks of prolonged use of prednisone including but not limited to weight gain, insomnia, osteoporosis, mood changes, diabetes, susceptibility to infection, glaucoma and high blood pressure.  In cases where prednisone use is prolonged, patients should be monitored with blood pressure checks, serum glucose levels and an eye exam.  Additionally, the patient may need to be placed on GI prophylaxis, PCP prophylaxis, and calcium and vitamin D supplementation and/or a bisphosphonate.  The patient verbalized understanding of the proper use and the possible adverse effects of prednisone.  All of the patient's questions and concerns were addressed. Rifampin Counseling: I discussed with the patient the risks of rifampin including but not limited to liver damage, kidney damage, red-orange body fluids, nausea/vomiting and severe allergy. Ketoconazole Counseling:   Patient counseled regarding improving absorption with orange juice.  Adverse effects include but are not limited to breast enlargement, headache, diarrhea, nausea, upset stomach, liver function test abnormalities, taste disturbance, and stomach pain.  There is a rare possibility of liver failure that can occur when taking ketoconazole. The patient understands that monitoring of LFTs may be required, especially at baseline. The patient verbalized understanding of the proper use and possible adverse effects of ketoconazole.  All of the patient's questions and concerns were addressed. SSKI Counseling:  I discussed with the patient the risks of SSKI including but not limited to thyroid abnormalities, metallic taste, GI upset, fever, headache, acne, arthralgias, paraesthesias, lymphadenopathy, easy bleeding, arrhythmias, and allergic reaction. Doxycycline Counseling:  Patient counseled regarding possible photosensitivity and increased risk for sunburn.  Patient instructed to avoid sunlight, if possible.  When exposed to sunlight, patients should wear protective clothing, sunglasses, and sunscreen.  The patient was instructed to call the office immediately if the following severe adverse effects occur:  hearing changes, easy bruising/bleeding, severe headache, or vision changes.  The patient verbalized understanding of the proper use and possible adverse effects of doxycycline.  All of the patient's questions and concerns were addressed. Nsaids Counseling: NSAID Counseling: I discussed with the patient that NSAIDs should be taken with food. Prolonged use of NSAIDs can result in the development of stomach ulcers.  Patient advised to stop taking NSAIDs if abdominal pain occurs.  The patient verbalized understanding of the proper use and possible adverse effects of NSAIDs.  All of the patient's questions and concerns were addressed. Birth Control Pills Pregnancy And Lactation Text: This medication should be avoided if pregnant and for the first 30 days post-partum. Cimzia Counseling:  I discussed with the patient the risks of Cimzia including but not limited to immunosuppression, allergic reactions and infections.  The patient understands that monitoring is required including a PPD at baseline and must alert us or the primary physician if symptoms of infection or other concerning signs are noted. Cellcept Counseling:  I discussed with the patient the risks of mycophenolate mofetil including but not limited to infection/immunosuppression, GI upset, hypokalemia, hypercholesterolemia, bone marrow suppression, lymphoproliferative disorders, malignancy, GI ulceration/bleed/perforation, colitis, interstitial lung disease, kidney failure, progressive multifocal leukoencephalopathy, and birth defects.  The patient understands that monitoring is required including a baseline creatinine and regular CBC testing. In addition, patient must alert us immediately if symptoms of infection or other concerning signs are noted. Drysol Counseling:  I discussed with the patient the risks of drysol/aluminum chloride including but not limited to skin rash, itching, irritation, burning. Minoxidil Counseling: Minoxidil is a topical medication which can increase blood flow where it is applied. It is uncertain how this medication increases hair growth. Side effects are uncommon and include stinging and allergic reactions. Rifampin Pregnancy And Lactation Text: This medication is Pregnancy Category C and it isn't know if it is safe during pregnancy. It is also excreted in breast milk and should not be used if you are breast feeding. Doxepin Pregnancy And Lactation Text: This medication is Pregnancy Category C and it isn't known if it is safe during pregnancy. It is also excreted in breast milk and breast feeding isn't recommended. Erivedge Counseling- I discussed with the patient the risks of Erivedge including but not limited to nausea, vomiting, diarrhea, constipation, weight loss, changes in the sense of taste, decreased appetite, muscle spasms, and hair loss.  The patient verbalized understanding of the proper use and possible adverse effects of Erivedge.  All of the patient's questions and concerns were addressed. 5-Fu Counseling: 5-Fluorouracil Counseling:  I discussed with the patient the risks of 5-fluorouracil including but not limited to erythema, scaling, itching, weeping, crusting, and pain. Protopic Counseling: Patient may experience a mild burning sensation during topical application. Protopic is not approved in children less than 2 years of age. There have been case reports of hematologic and skin malignancies in patients using topical calcineurin inhibitors although causality is questionable. Ketoconazole Pregnancy And Lactation Text: This medication is Pregnancy Category C and it isn't know if it is safe during pregnancy. It is also excreted in breast milk and breast feeding isn't recommended. Cosentyx Counseling:  I discussed with the patient the risks of Cosentyx including but not limited to worsening of Crohn's disease, immunosuppression, allergic reactions and infections.  The patient understands that monitoring is required including a PPD at baseline and must alert us or the primary physician if symptoms of infection or other concerning signs are noted. Bexarotene Pregnancy And Lactation Text: This medication is Pregnancy Category X and should not be given to women who are pregnant or may become pregnant. This medication should not be used if you are breast feeding.

## 2025-01-08 NOTE — DIETITIAN INITIAL EVALUATION ADULT - ENERGY INTAKE
Currently in-house, pt reported improved appetite and po intake, stated it is now back at baseline. Per flow sheets, pt noted to be consuming % of meals. Pt and son amenable to Ensure Plus High Protein 1x/day in-house to optimize protein-energy intake. RD obtained food preferences to optimize PO intake, will honor as able. RD provided pt with a menu in both Thai and english to assist with food preferences and optimize PO intake.   Adequate (%)

## 2025-01-08 NOTE — PROGRESS NOTE ADULT - PROBLEM SELECTOR PLAN 1
presented with fever and severe neutropenia -  on admission --> 0 (1/6)  positive for JOSELUIS and RF in the past, not on any medications per son  Positive for Lyme serology back in 2022, however, negative confirmatory immunoblot IgG or IgM. Per PCP, no lyme disease or treatment outpatient.    Workup:   (+) Enterovirus. (+)EBV IgG/EBNA/VCA/EA. (+) CMV IgG - previous infection vs. reactivation  Low uric acid (2.1), elevated LDH (292), Hapto wnl  - Appreciated Heme recs    Plan  - dc Vancomycin (mrsa pcr negative)  - c/w Zosyn - will discontinue if workup for bacterial infection unremarkable  - f/u peripheral blood smear, consider BHB if abnormal per Heme  - Rheum c/s given (+)JOSELUIS, RF previously presented with fever and severe neutropenia -  on admission --> 0 (1/6)  positive for JOSELUIS and RF in the past, not on any medications per son  Positive for Lyme serology back in 2022, however, negative confirmatory immunoblot IgG or IgM. Per PCP, no lyme disease or treatment outpatient.    Workup:   (+) Enterovirus. (+)EBV IgG/EBNA/VCA/EA. (+) CMV IgG. Low uric acid (2.1), elevated LDH (292), Hapto wnl  - elevated CCP and RF  - US with no splenomegaly  - s/p BMB      Plan  - c/w Zosyn, plan to transition to Cipro/Augmentin  - f/u XR of hands/feet   - f/u prelim BMB

## 2025-01-08 NOTE — PROGRESS NOTE ADULT - ASSESSMENT
79F with no report of PMHx and not on home medications, presented with fever and weakness. Found to have severe neutropenia, hyponatremia, and positive for Enterovirus. Admitted to medicine for further workup. Patient is empirically treated with Vanc and Zosyn. Heme was consulted.      Per chart review and collateral obtained from PCP  - Labs showed neutropenia in 2022, and followed Dr. Tonja Perry ([facility]), however, lost to follow up.  - diagnosed with RA in 2022, followed Dr. Lucy Almanzar at that time. Further workup indicated RA limited to feet, so NSAID was recommended with close monitor. However, patient lost to follow up subsequently  - Lyme serology positive in 2022, negative confirmatory immunoblot IgG and IgM. Serologic response to B. burgdorferi infection not detected, recommended for follow up in 7-14 days. However, patient lost to follow up 79F with no report of PMHx and not on home medications, presented with fever and weakness. Found to have severe neutropenia, hyponatremia, and positive for Enterovirus. Admitted to medicine for further workup. Patient is empirically treated with Vanc and Zosyn. Heme was consulted, patient was s/p BMB.      Per chart review and collateral obtained from PCP  - Labs showed neutropenia in 2022, and followed Dr. Tonja Perry ([facility]), however, lost to follow up.  - diagnosed with RA in 2022, followed Dr. Lucy Almanzar at that time. Further workup indicated RA limited to feet, so NSAID was recommended with close monitor. However, patient lost to follow up subsequently  - Lyme serology positive in 2022, negative confirmatory immunoblot IgG and IgM. Serologic response to B. burgdorferi infection not detected, recommended for follow up in 7-14 days. However, patient lost to follow up

## 2025-01-08 NOTE — DIETITIAN INITIAL EVALUATION ADULT - PROBLEM SELECTOR PLAN 3
- possibly atrophic vaginitis  - outpatient gyn f/u for further eval and consideration of estrogen therapy

## 2025-01-08 NOTE — DIETITIAN INITIAL EVALUATION ADULT - REASON INDICATOR FOR ASSESSMENT
RD consult warranted for Assessment, Education  Source: Patient, Patient's Son at Bedside, Electronic Medical Record  Chart reviewed, events noted.

## 2025-01-08 NOTE — DIETITIAN INITIAL EVALUATION ADULT - ETIOLOGY
related to previous suspected energy intake less than estimated energy expenditure  related to inability to meet sufficient protein-energy needs in setting of diminished appetite secondary to acute illness

## 2025-01-08 NOTE — DIETITIAN INITIAL EVALUATION ADULT - OTHER INFO
Weights:  - UBW (per patient): 125 pounds --> ~122 pounds (currently? question accuracy?)  - Dosing Weight (per chart): 112.4 pounds (1/6) (standing)  - Daily Weights (per flow sheets): 114.8 pounds (1/8)  - Bed Scale Weight (taken by RD): RD unable to obtain bed weight, pt seated in chair during interview.   - Per [facility] HIE: 112.7 pounds (1/6/25), 100.1 pounds (1/5/25), 116 pounds (9/13/22)  Weight discrepancies noted between reported UBW and current in-house weights. Standing weight in-house used for BMI. Based on HIE, 4 pound weight loss noted >2 years, not significant. RD to continue to monitor weights and trends as able.     Nutritional Concerns:  - Ordered for antibiotics in-house (per orders).  - Pt with hyponatremia (per chart), sodium 134 mmol/L (1/8). Ordered for 1.5 L fluid restriction.  - Pt is s/p bone marrow biopsy 1/7 (per chart).  - Potassium and Phosphorous low 1/7, s/p repletion (per orders).  - Glucose elevated yesterday, 1/7? 109 mg/dL today (1/8), A1C 5.7% (1/7) indicating good control for age. Continue to monitor.   - S/p Speech Language Pathologist evaluation 1/7, recommended regular diet.

## 2025-01-08 NOTE — PROGRESS NOTE ADULT - SUBJECTIVE AND OBJECTIVE BOX
Patient is a 79y old  Female who presents with a chief complaint of Sepsis (07 Jan 2025 13:35)      SUBJECTIVE / OVERNIGHT EVENTS:  No acute event overnight    Pt seen and examined at bedside. Had no complaints. Denied cp, sob, d/n/v    MEDICATIONS  (STANDING):  dextrose 5%. 1000 milliLiter(s) (83 mL/Hr) IV Continuous <Continuous>  heparin   Injectable 5000 Unit(s) SubCutaneous every 12 hours  piperacillin/tazobactam IVPB.. 3.375 Gram(s) IV Intermittent every 8 hours    MEDICATIONS  (PRN):  acetaminophen     Tablet .. 650 milliGRAM(s) Oral every 6 hours PRN Temp greater or equal to 38C (100.4F), Mild Pain (1 - 3)  aluminum hydroxide/magnesium hydroxide/simethicone Suspension 30 milliLiter(s) Oral every 4 hours PRN Dyspepsia  melatonin 3 milliGRAM(s) Oral at bedtime PRN Insomnia  ondansetron Injectable 4 milliGRAM(s) IV Push every 8 hours PRN Nausea and/or Vomiting      CAPILLARY BLOOD GLUCOSE        I&O's Summary    06 Jan 2025 07:01  -  07 Jan 2025 07:00  --------------------------------------------------------  IN: 320 mL / OUT: 0 mL / NET: 320 mL    07 Jan 2025 07:01  -  08 Jan 2025 06:48  --------------------------------------------------------  IN: 1260 mL / OUT: 0 mL / NET: 1260 mL        Vital Signs Last 24 Hrs  T(C): 36.9 (08 Jan 2025 05:00), Max: 37.2 (08 Jan 2025 00:44)  T(F): 98.4 (08 Jan 2025 05:00), Max: 99 (08 Jan 2025 00:44)  HR: 76 (08 Jan 2025 05:00) (67 - 76)  BP: 135/65 (08 Jan 2025 05:00) (120/68 - 158/79)  BP(mean): --  RR: 18 (08 Jan 2025 05:00) (18 - 18)  SpO2: 95% (08 Jan 2025 05:00) (95% - 97%)    Parameters below as of 08 Jan 2025 05:00  Patient On (Oxygen Delivery Method): room air        Physical Exam  CONSTITUTIONAL: NAD  EYES: EOMI, conjunctiva and sclera clear  ENMT: Moist oral mucosa  NECK: Supple  RESPIRATORY: Breathing unlabored, CTAB  CARDIOVASCULAR: S1S2 no MRG  ABDOMEN: Nontender to palpation, normoactive bowel sounds, no rebound/guarding  MUSCULOSKELETAL: no clubbing or cyanosis of digits  NEUROLOGY: No focal deficits   SKIN: No rashes or lesions    LABS:                        10.4   1.70  )-----------( 195      ( 07 Jan 2025 07:38 )             31.2      01-08    132[L]  |  103  |  12  ----------------------------<  122[H]  4.2   |  20[L]  |  1.03    Ca    9.0      08 Jan 2025 02:30  Phos  2.1     01-07  Mg     2.1     01-07    TPro  6.7  /  Alb  2.8[L]  /  TBili  0.4  /  DBili  x   /  AST  41[H]  /  ALT  36  /  AlkPhos  220[H]  01-07          Urinalysis Basic - ( 08 Jan 2025 02:30 )    Color: x / Appearance: x / SG: x / pH: x  Gluc: 122 mg/dL / Ketone: x  / Bili: x / Urobili: x   Blood: x / Protein: x / Nitrite: x   Leuk Esterase: x / RBC: x / WBC x   Sq Epi: x / Non Sq Epi: x / Bacteria: x        RADIOLOGY & ADDITIONAL TESTS:    Imaging Personally Reviewed:    Consultant(s) Notes Reviewed:      Care Discussed with Consultants/Other Providers:   Patient is a 79y old  Female who presents with a chief complaint of Sepsis (07 Jan 2025 13:35)      SUBJECTIVE / OVERNIGHT EVENTS:  No acute event overnight    Pt seen and examined at bedside. Reported pain improved after BMB yesterday. Reported soft/loose bm x3. Denied fever, chill, cp, abd pain, palpitation, hematochezia, sob, d/n/v    MEDICATIONS  (STANDING):  dextrose 5%. 1000 milliLiter(s) (83 mL/Hr) IV Continuous <Continuous>  heparin   Injectable 5000 Unit(s) SubCutaneous every 12 hours  piperacillin/tazobactam IVPB.. 3.375 Gram(s) IV Intermittent every 8 hours    MEDICATIONS  (PRN):  acetaminophen     Tablet .. 650 milliGRAM(s) Oral every 6 hours PRN Temp greater or equal to 38C (100.4F), Mild Pain (1 - 3)  aluminum hydroxide/magnesium hydroxide/simethicone Suspension 30 milliLiter(s) Oral every 4 hours PRN Dyspepsia  melatonin 3 milliGRAM(s) Oral at bedtime PRN Insomnia  ondansetron Injectable 4 milliGRAM(s) IV Push every 8 hours PRN Nausea and/or Vomiting      CAPILLARY BLOOD GLUCOSE        I&O's Summary    06 Jan 2025 07:01  -  07 Jan 2025 07:00  --------------------------------------------------------  IN: 320 mL / OUT: 0 mL / NET: 320 mL    07 Jan 2025 07:01  -  08 Jan 2025 06:48  --------------------------------------------------------  IN: 1260 mL / OUT: 0 mL / NET: 1260 mL        Vital Signs Last 24 Hrs  T(C): 36.9 (08 Jan 2025 05:00), Max: 37.2 (08 Jan 2025 00:44)  T(F): 98.4 (08 Jan 2025 05:00), Max: 99 (08 Jan 2025 00:44)  HR: 76 (08 Jan 2025 05:00) (67 - 76)  BP: 135/65 (08 Jan 2025 05:00) (120/68 - 158/79)  BP(mean): --  RR: 18 (08 Jan 2025 05:00) (18 - 18)  SpO2: 95% (08 Jan 2025 05:00) (95% - 97%)    Parameters below as of 08 Jan 2025 05:00  Patient On (Oxygen Delivery Method): room air        Physical Exam  CONSTITUTIONAL: NAD  EYES: EOMI, conjunctiva and sclera clear  ENMT: Moist oral mucosa  NECK: Supple  RESPIRATORY: Breathing unlabored, CTAB  CARDIOVASCULAR: S1S2 no MRG  ABDOMEN: Nontender to palpation, normoactive bowel sounds, no rebound/guarding  MUSCULOSKELETAL: no clubbing or cyanosis of digits  NEUROLOGY: No focal deficits   SKIN: No rashes or lesions    LABS:                        10.4   1.70  )-----------( 195      ( 07 Jan 2025 07:38 )             31.2      01-08    132[L]  |  103  |  12  ----------------------------<  122[H]  4.2   |  20[L]  |  1.03    Ca    9.0      08 Jan 2025 02:30  Phos  2.1     01-07  Mg     2.1     01-07    TPro  6.7  /  Alb  2.8[L]  /  TBili  0.4  /  DBili  x   /  AST  41[H]  /  ALT  36  /  AlkPhos  220[H]  01-07          Urinalysis Basic - ( 08 Jan 2025 02:30 )    Color: x / Appearance: x / SG: x / pH: x  Gluc: 122 mg/dL / Ketone: x  / Bili: x / Urobili: x   Blood: x / Protein: x / Nitrite: x   Leuk Esterase: x / RBC: x / WBC x   Sq Epi: x / Non Sq Epi: x / Bacteria: x        RADIOLOGY & ADDITIONAL TESTS:    Imaging Personally Reviewed:    Consultant(s) Notes Reviewed:      Care Discussed with Consultants/Other Providers:

## 2025-01-08 NOTE — DIETITIAN INITIAL EVALUATION ADULT - PERTINENT LABORATORY DATA
01-08    134[L]  |  102  |  13  ----------------------------<  109[H]  4.1   |  19[L]  |  1.00    Ca    9.0      08 Jan 2025 07:42  Phos  2.7     01-08  Mg     2.2     01-08    TPro  7.1  /  Alb  3.1[L]  /  TBili  0.3  /  DBili  x   /  AST  35  /  ALT  35  /  AlkPhos  270[H]  01-08  A1C with Estimated Average Glucose Result: 5.7 % (01-07-25 @ 07:38)

## 2025-01-08 NOTE — DIETITIAN INITIAL EVALUATION ADULT - NSFNSGIIOFT_GEN_A_CORE
Pt reported no GI distress at this time.  - Ordered for aluminum hydroxide/magnesium hydroxide/simethicone Suspension and zofran (per orders).   - Last BM: 1/8 (per patient).  - Bowel Regimen: Pt not currently ordered for a bowel regimen at this time (per orders).   Continue to monitor bowel movements and bowel movement regularity.

## 2025-01-08 NOTE — DIETITIAN INITIAL EVALUATION ADULT - PERTINENT MEDS FT
MEDICATIONS  (STANDING):  heparin   Injectable 5000 Unit(s) SubCutaneous every 12 hours  piperacillin/tazobactam IVPB.. 3.375 Gram(s) IV Intermittent every 8 hours    MEDICATIONS  (PRN):  acetaminophen     Tablet .. 650 milliGRAM(s) Oral every 6 hours PRN Temp greater or equal to 38C (100.4F), Mild Pain (1 - 3)  aluminum hydroxide/magnesium hydroxide/simethicone Suspension 30 milliLiter(s) Oral every 4 hours PRN Dyspepsia  melatonin 3 milliGRAM(s) Oral at bedtime PRN Insomnia  ondansetron Injectable 4 milliGRAM(s) IV Push every 8 hours PRN Nausea and/or Vomiting

## 2025-01-08 NOTE — DIETITIAN INITIAL EVALUATION ADULT - ADD RECOMMEND
1) Recommend continue Regular diet as tolerated, defer diet texture/consistency to Medical Team/Speech Language Pathologist. Defer fluid restriction to medical team.  2) Recommend adding Ensure Plus High Protein 1x/day to optimize protein-energy intake in-house.   3) Recommend adding multivitamin daily for micronutrient coverage unless contraindicated.   4) Monitor and encourage adequate PO intake, RD obtained food preferences to optimize PO intake, will honor as able. Provided pt and family with menu.  5) Monitor electrolytes, replete as needed. Monitor potassium, magnesium and phosphorous. Continue to monitor and trend glucose levels.   6) Monitor nutritional intake, tolerance to diet prescription, bowel movements, weights, labs, and skin integrity.  1) Recommend continue Regular diet as tolerated, defer diet texture/consistency to Medical Team/Speech Language Pathologist. Defer fluid restriction to medical team.  2) Recommend adding Ensure Plus High Protein 1x/day to optimize protein-energy intake in-house.   3) Recommend adding multivitamin daily for micronutrient coverage unless contraindicated.   4) Monitor and encourage adequate PO intake, RD obtained food preferences to optimize PO intake, will honor as able. Provided pt and family with menu.  5) Monitor electrolytes, replete as needed. Monitor potassium, magnesium and phosphorous. Continue to monitor and trend glucose levels.   6) Monitor nutritional intake, tolerance to diet prescription, bowel movements, weights, labs, and skin integrity.   7) BMI sticker placed in chart.

## 2025-01-08 NOTE — PROGRESS NOTE ADULT - ASSESSMENT
78 y/o F reportedly no medical hx, per chart review has hx of hypothyroidism, RA, HTN, presents for 2 days of nasal congestion, clear rhinorrhea, cough, fever, a/w worsening lethargy and poor PO intake. Patient recently travelled to [facility], returned on 12/30. Now patient has neutropenic ?sepsis.     SNa was 122 at the time of admission. Pt received IV fluids and it improved to 120. Serum osmolality 250. Urine osm 442. Urine Na 55. Pt is not on any medications and has poor PO intake.     Hypo-osmolar hyponatremia:  Likely SIADH.   SNa improved to 129 and corrected  Na is 132, further improved to 134 today.    Oral fluid restriction to <1L a day.   Encourage PO intake of food as osmolar load would improve the serum sodium.   Change all drips to NS instead of D5.   Avoid rapid correction and goal correction is <8 mEq/L a day.        78 y/o F reportedly no medical hx, per chart review has hx of hypothyroidism, RA, HTN, presents for 2 days of nasal congestion, clear rhinorrhea, cough, fever, a/w worsening lethargy and poor PO intake. Patient recently travelled to [facility], returned on 12/30. Now patient has neutropenic ?sepsis.     SNa was 122 at the time of admission. Pt received IV fluids and it improved to 130. Serum osmolality 250. Urine osm 442. Urine Na 55. Pt is not on any medications and has poor PO intake.     Hypo-osmolar hyponatremia:  Likely SIADH.   SNa improved to 129 and corrected  Na is 132, further improved to 134 today.    Oral fluid restriction to <1L a day.   Encourage PO intake of food as osmolar load would improve the serum sodium.   Change all drips to NS instead of D5.

## 2025-01-08 NOTE — DIETITIAN INITIAL EVALUATION ADULT - ORAL INTAKE PTA/DIET HISTORY
Nutrition assessment completed at bedside. Pt's son at bedside, interpreted for the patient (pt preference). Pt's son reported decreased appetite/po intake for "a few days" in setting of fever. Stated pt's UBW is ~125 pounds, endorsed pt currently weighing ~122 pounds now at this time (question accuracy, not consistent with in-house weights)? Confirmed no known food allergies.

## 2025-01-08 NOTE — DIETITIAN INITIAL EVALUATION ADULT - SIGNS/SYMPTOMS
as evidenced by pt with poor po intake x "a few days," now improved.  as evidenced by pt with BMI <19.

## 2025-01-08 NOTE — PROGRESS NOTE ADULT - SUBJECTIVE AND OBJECTIVE BOX
[facility] Division of Kidney Diseases & Hypertension  FOLLOW UP NOTE  [phone]--------------------------------------------------------------------------------  Chief Complaint:Fever due to unspecified condition    24 hour events/subjective: Pt was seen and examined earlier today. Pt got bone marrow biopsy done. Complains of some pain in the biopsy site.   Reports feeling better now. Pt denies SOB/ Constipation/ Diarrhea/ Nausea/ Vomiting/ abdominal pain/ chest pain/ tingling/ numbness.     PAST HISTORY  --------------------------------------------------------------------------------  No significant changes to PMH, PSH, FHx, SHx, unless otherwise noted    ALLERGIES & MEDICATIONS  --------------------------------------------------------------------------------  Allergies    No Known Allergies    Intolerances      Standing Inpatient Medications  heparin   Injectable 5000 Unit(s) SubCutaneous every 12 hours  piperacillin/tazobactam IVPB.. 3.375 Gram(s) IV Intermittent every 8 hours    PRN Inpatient Medications  acetaminophen     Tablet .. 650 milliGRAM(s) Oral every 6 hours PRN  aluminum hydroxide/magnesium hydroxide/simethicone Suspension 30 milliLiter(s) Oral every 4 hours PRN  melatonin 3 milliGRAM(s) Oral at bedtime PRN  ondansetron Injectable 4 milliGRAM(s) IV Push every 8 hours PRN      REVIEW OF SYSTEMS  --------------------------------------------------------------------------------  as noted above     VITALS/PHYSICAL EXAM  --------------------------------------------------------------------------------  T(C): 36.8 (01-08-25 @ 11:04), Max: 37.2 (01-08-25 @ 00:44)  HR: 77 (01-08-25 @ 11:04) (72 - 77)  BP: 132/68 (01-08-25 @ 11:04) (132/68 - 158/79)  RR: 18 (01-08-25 @ 11:04) (18 - 18)  SpO2: 98% (01-08-25 @ 11:04) (95% - 98%)  Wt(kg): --  Height (cm): 165 (01-06-25 @ 19:40)  Weight (kg): 51 (01-06-25 @ 19:40)  BMI (kg/m2): 18.7 (01-06-25 @ 19:40)  BSA (m2): 1.55 (01-06-25 @ 19:40)      01-07-25 @ 07:01  -  01-08-25 @ 07:00  --------------------------------------------------------  IN: 1260 mL / OUT: 0 mL / NET: 1260 mL      Physical Exam:  Gen: NAD, well-appearing  HEENT: PERRL, supple neck, clear oropharynx  Pulm: CTA B/L  CV: RRR, S1S2;  Back: No spinal or CVA tenderness  Abd: +BS, soft, nontender/nondistended  : No suprapubic tenderness   Extremities: no bilateral LE edema noted.     Neuro: No focal deficits, intact gait  Skin: Warm, without rashes      LABS/STUDIES  --------------------------------------------------------------------------------              10.7   1.82  >-----------<  200      [01-08-25 @ 07:42]              32.7     134  |  102  |  13  ----------------------------<  109      [01-08-25 @ 07:42]  4.1   |  19  |  1.00        Ca     9.0     [01-08-25 @ 07:42]      Mg     2.2     [01-08-25 @ 07:42]      Phos  2.7     [01-08-25 @ 07:42]    TPro  7.1  /  Alb  3.1  /  TBili  0.3  /  DBili  x   /  AST  35  /  ALT  35  /  AlkPhos  270  [01-08-25 @ 07:42]    Creatinine Trend:  SCr 1.00 [01-08 @ 07:42]  SCr 1.03 [01-08 @ 02:30]  SCr 1.04 [01-08 @ 02:08]  SCr 1.02 [01-07 @ 18:08]  SCr 1.00 [01-07 @ 07:38]        Urine Sodium 69      [01-06-25 @ 02:55]  Urine Osmolality 442      [01-05-25 @ 20:36]    Iron 16, TIBC 229, %sat 7      [01-07-25 @ 07:38]  Ferritin 192      [01-08-25 @ 07:42]  TSH 2.93      [01-06-25 @ 06:11]  Lipid: chol 198, , HDL 59, LDL --      [01-07-25 @ 07:38]    HBsAg Nonreact      [01-06-25 @ 06:11]  HCV 0.50, Nonreact      [01-06-25 @ 06:11]  HIV Nonreact      [01-06-25 @ 06:11]    Rheumatoid Factor 49      [01-08-25 @ 07:42]

## 2025-01-09 LAB
ALBUMIN SERPL ELPH-MCNC: 3.1 G/DL — LOW (ref 3.3–5)
ALP SERPL-CCNC: 323 U/L — HIGH (ref 40–120)
ALT FLD-CCNC: 39 U/L — SIGNIFICANT CHANGE UP (ref 10–45)
ANION GAP SERPL CALC-SCNC: 13 MMOL/L — SIGNIFICANT CHANGE UP (ref 5–17)
AST SERPL-CCNC: 39 U/L — SIGNIFICANT CHANGE UP (ref 10–40)
BASOPHILS # BLD AUTO: 0.03 K/UL — SIGNIFICANT CHANGE UP (ref 0–0.2)
BASOPHILS NFR BLD AUTO: 1.4 % — SIGNIFICANT CHANGE UP (ref 0–2)
BCR/ABL BY RT - PCR QUANTITATIVE: SIGNIFICANT CHANGE UP
BILIRUB SERPL-MCNC: 0.3 MG/DL — SIGNIFICANT CHANGE UP (ref 0.2–1.2)
BUN SERPL-MCNC: 19 MG/DL — SIGNIFICANT CHANGE UP (ref 7–23)
CALCIUM SERPL-MCNC: 9.5 MG/DL — SIGNIFICANT CHANGE UP (ref 8.4–10.5)
CHLORIDE SERPL-SCNC: 101 MMOL/L — SIGNIFICANT CHANGE UP (ref 96–108)
CO2 SERPL-SCNC: 20 MMOL/L — LOW (ref 22–31)
CREAT SERPL-MCNC: 1.16 MG/DL — SIGNIFICANT CHANGE UP (ref 0.5–1.3)
EGFR: 48 ML/MIN/1.73M2 — LOW
EOSINOPHIL # BLD AUTO: 0.02 K/UL — SIGNIFICANT CHANGE UP (ref 0–0.5)
EOSINOPHIL NFR BLD AUTO: 1 % — SIGNIFICANT CHANGE UP (ref 0–6)
GLUCOSE SERPL-MCNC: 107 MG/DL — HIGH (ref 70–99)
HCT VFR BLD CALC: 35.7 % — SIGNIFICANT CHANGE UP (ref 34.5–45)
HGB BLD-MCNC: 11.4 G/DL — LOW (ref 11.5–15.5)
HLX FLT3 FINAL REPORT: SIGNIFICANT CHANGE UP
HLX FLT3 FINAL REPORT: SIGNIFICANT CHANGE UP
LYMPHOCYTES # BLD AUTO: 1.21 K/UL — SIGNIFICANT CHANGE UP (ref 1–3.3)
LYMPHOCYTES # BLD AUTO: 58.2 % — HIGH (ref 13–44)
MAGNESIUM SERPL-MCNC: 2.2 MG/DL — SIGNIFICANT CHANGE UP (ref 1.6–2.6)
MCHC RBC-ENTMCNC: 27.1 PG — SIGNIFICANT CHANGE UP (ref 27–34)
MCHC RBC-ENTMCNC: 31.9 G/DL — LOW (ref 32–36)
MCV RBC AUTO: 85 FL — SIGNIFICANT CHANGE UP (ref 80–100)
MONOCYTES # BLD AUTO: 0.75 K/UL — SIGNIFICANT CHANGE UP (ref 0–0.9)
MONOCYTES NFR BLD AUTO: 36.1 % — HIGH (ref 2–14)
MPL EXON 10 MUTATION: SIGNIFICANT CHANGE UP
NEUTROPHILS # BLD AUTO: 0.07 K/UL — LOW (ref 1.8–7.4)
NEUTROPHILS NFR BLD AUTO: 3.3 % — LOW (ref 43–77)
NRBC # BLD: 0 /100 WBCS — SIGNIFICANT CHANGE UP (ref 0–0)
PHOSPHATE SERPL-MCNC: 3.4 MG/DL — SIGNIFICANT CHANGE UP (ref 2.5–4.5)
PLATELET # BLD AUTO: 244 K/UL — SIGNIFICANT CHANGE UP (ref 150–400)
POTASSIUM SERPL-MCNC: 4.1 MMOL/L — SIGNIFICANT CHANGE UP (ref 3.5–5.3)
POTASSIUM SERPL-SCNC: 4.1 MMOL/L — SIGNIFICANT CHANGE UP (ref 3.5–5.3)
PROT SERPL-MCNC: 7.2 G/DL — SIGNIFICANT CHANGE UP (ref 6–8.3)
RBC # BLD: 4.2 M/UL — SIGNIFICANT CHANGE UP (ref 3.8–5.2)
RBC # FLD: 14 % — SIGNIFICANT CHANGE UP (ref 10.3–14.5)
SODIUM SERPL-SCNC: 134 MMOL/L — LOW (ref 135–145)
WBC # BLD: 2.08 K/UL — LOW (ref 3.8–10.5)
WBC # FLD AUTO: 2.08 K/UL — LOW (ref 3.8–10.5)

## 2025-01-09 PROCEDURE — 73120 X-RAY EXAM OF HAND: CPT | Mod: 26,50

## 2025-01-09 PROCEDURE — 99232 SBSQ HOSP IP/OBS MODERATE 35: CPT | Mod: GC

## 2025-01-09 PROCEDURE — 73620 X-RAY EXAM OF FOOT: CPT | Mod: 26,50

## 2025-01-09 PROCEDURE — 73610 X-RAY EXAM OF ANKLE: CPT | Mod: 26,50

## 2025-01-09 PROCEDURE — 73110 X-RAY EXAM OF WRIST: CPT | Mod: 26,50

## 2025-01-09 PROCEDURE — 99232 SBSQ HOSP IP/OBS MODERATE 35: CPT

## 2025-01-09 RX ADMIN — HEPARIN SODIUM 5000 UNIT(S): 1000 INJECTION, SOLUTION INTRAVENOUS; SUBCUTANEOUS at 04:58

## 2025-01-09 RX ADMIN — PIPERACILLIN AND TAZOBACTAM 25 GRAM(S): 3; .375 INJECTION, POWDER, LYOPHILIZED, FOR SOLUTION INTRAVENOUS at 22:54

## 2025-01-09 RX ADMIN — PIPERACILLIN AND TAZOBACTAM 25 GRAM(S): 3; .375 INJECTION, POWDER, LYOPHILIZED, FOR SOLUTION INTRAVENOUS at 13:31

## 2025-01-09 RX ADMIN — PIPERACILLIN AND TAZOBACTAM 25 GRAM(S): 3; .375 INJECTION, POWDER, LYOPHILIZED, FOR SOLUTION INTRAVENOUS at 04:57

## 2025-01-09 NOTE — PROGRESS NOTE ADULT - SUBJECTIVE AND OBJECTIVE BOX
Patient is a 79y old  Female who presents with a chief complaint of Fever due to unspecified condition     (08 Jan 2025 13:39)      SUBJECTIVE / OVERNIGHT EVENTS:  No acute event overnight    Pt seen and examined at bedside. Had no complaints. Denied cp, sob, d/n/v    MEDICATIONS  (STANDING):  heparin   Injectable 5000 Unit(s) SubCutaneous every 12 hours  piperacillin/tazobactam IVPB.. 3.375 Gram(s) IV Intermittent every 8 hours    MEDICATIONS  (PRN):  acetaminophen     Tablet .. 650 milliGRAM(s) Oral every 6 hours PRN Temp greater or equal to 38C (100.4F), Mild Pain (1 - 3)  aluminum hydroxide/magnesium hydroxide/simethicone Suspension 30 milliLiter(s) Oral every 4 hours PRN Dyspepsia  melatonin 3 milliGRAM(s) Oral at bedtime PRN Insomnia  ondansetron Injectable 4 milliGRAM(s) IV Push every 8 hours PRN Nausea and/or Vomiting      CAPILLARY BLOOD GLUCOSE        I&O's Summary    07 Jan 2025 07:01  -  08 Jan 2025 07:00  --------------------------------------------------------  IN: 1260 mL / OUT: 0 mL / NET: 1260 mL        Vital Signs Last 24 Hrs  T(C): 36.6 (09 Jan 2025 04:42), Max: 37.2 (09 Jan 2025 00:04)  T(F): 97.9 (09 Jan 2025 04:42), Max: 99 (09 Jan 2025 00:04)  HR: 67 (09 Jan 2025 04:42) (67 - 77)  BP: 157/73 (09 Jan 2025 04:42) (132/68 - 169/78)  BP(mean): --  RR: 16 (09 Jan 2025 04:42) (16 - 18)  SpO2: 97% (09 Jan 2025 04:42) (97% - 99%)    Parameters below as of 09 Jan 2025 04:42  Patient On (Oxygen Delivery Method): room air        Physical Exam  CONSTITUTIONAL: NAD  EYES: EOMI, conjunctiva and sclera clear  ENMT: Moist oral mucosa  NECK: Supple  RESPIRATORY: Breathing unlabored, CTAB  CARDIOVASCULAR: S1S2 no MRG  ABDOMEN: Nontender to palpation, normoactive bowel sounds, no rebound/guarding  MUSCULOSKELETAL: no clubbing or cyanosis of digits  NEUROLOGY: No focal deficits   SKIN: No rashes or lesions    LABS:                        10.7   1.82  )-----------( 200      ( 08 Jan 2025 07:42 )             32.7      01-08    134[L]  |  102  |  13  ----------------------------<  109[H]  4.1   |  19[L]  |  1.00    Ca    9.0      08 Jan 2025 07:42  Phos  2.7     01-08  Mg     2.2     01-08    TPro  7.1  /  Alb  3.1[L]  /  TBili  0.3  /  DBili  x   /  AST  35  /  ALT  35  /  AlkPhos  270[H]  01-08          Urinalysis Basic - ( 08 Jan 2025 07:42 )    Color: x / Appearance: x / SG: x / pH: x  Gluc: 109 mg/dL / Ketone: x  / Bili: x / Urobili: x   Blood: x / Protein: x / Nitrite: x   Leuk Esterase: x / RBC: x / WBC x   Sq Epi: x / Non Sq Epi: x / Bacteria: x        RADIOLOGY & ADDITIONAL TESTS:    Imaging Personally Reviewed:    Consultant(s) Notes Reviewed:      Care Discussed with Consultants/Other Providers:

## 2025-01-09 NOTE — PROGRESS NOTE ADULT - PROBLEM SELECTOR PLAN 2
on admission 122 --> 121 --> 120 --> 2% HTS 30cc/hr for 6hrs --> 129 (1/7) --> 132 --> 134 (1/9)  Sosmo: 250, Uosmo: 442, Ankita: 69  - likely SIADH vs poor intake   - Appreciated Nephro recs    Plan  - monitor off HTS  - Fluid restriction  - Trend cmp

## 2025-01-09 NOTE — PROGRESS NOTE ADULT - ASSESSMENT
79F with no report of PMHx and not on home medications, presented with fever and weakness. Found to have severe neutropenia, hyponatremia, and positive for Enterovirus. Admitted to medicine for further workup. Patient is empirically treated with Vanc and Zosyn. Heme was consulted, patient was s/p BMB.      Per chart review and collateral obtained from PCP  - Labs showed neutropenia in 2022, and followed Dr. Tonja Perry ([facility]), however, lost to follow up.  - diagnosed with RA in 2022, followed Dr. Lucy Almanzar at that time. Further workup indicated RA limited to feet, so NSAID was recommended with close monitor. However, patient lost to follow up subsequently  - Lyme serology positive in 2022, negative confirmatory immunoblot IgG and IgM. Serologic response to B. burgdorferi infection not detected, recommended for follow up in 7-14 days. However, patient lost to follow up

## 2025-01-09 NOTE — PROGRESS NOTE ADULT - SUBJECTIVE AND OBJECTIVE BOX
[facility] Division of Kidney Diseases & Hypertension  FOLLOW UP NOTE  [phone]--------------------------------------------------------------------------------  Chief Complaint:Fever due to unspecified condition, hyponatremia    24 hour events/subjective: Pt was seen and examined earlier today. No acute overnight events. No fresh complaints. Pt reports feeling better. Son was at the bedside.   Pt denies SOB/ Constipation/ Diarrhea/ Nausea/ Vomiting/ abdominal pain/ chest pain/ tingling/ numbness.         PAST HISTORY  --------------------------------------------------------------------------------  No significant changes to PMH, PSH, FHx, SHx, unless otherwise noted    ALLERGIES & MEDICATIONS  --------------------------------------------------------------------------------  Allergies    No Known Allergies    Intolerances      Standing Inpatient Medications  heparin   Injectable 5000 Unit(s) SubCutaneous every 12 hours  piperacillin/tazobactam IVPB.. 3.375 Gram(s) IV Intermittent every 8 hours    PRN Inpatient Medications  acetaminophen     Tablet .. 650 milliGRAM(s) Oral every 6 hours PRN  aluminum hydroxide/magnesium hydroxide/simethicone Suspension 30 milliLiter(s) Oral every 4 hours PRN  melatonin 3 milliGRAM(s) Oral at bedtime PRN  ondansetron Injectable 4 milliGRAM(s) IV Push every 8 hours PRN      REVIEW OF SYSTEMS  --------------------------------------------------------------------------------  as noted above.     VITALS/PHYSICAL EXAM  --------------------------------------------------------------------------------  T(C): 36.7 (01-09-25 @ 10:18), Max: 37.2 (01-09-25 @ 00:04)  HR: 71 (01-09-25 @ 10:18) (67 - 75)  BP: 141/69 (01-09-25 @ 10:18) (141/69 - 169/78)  RR: 17 (01-09-25 @ 10:18) (16 - 17)  SpO2: 98% (01-09-25 @ 10:18) (97% - 99%)  Wt(kg): --        Physical Exam:  Gen: NAD, well-appearing  HEENT: PERRL, supple neck, clear oropharynx  Pulm: CTA B/L  CV: RRR, S1S2;  Back: No spinal or CVA tenderness  Abd: +BS, soft, nontender/nondistended  : No suprapubic tenderness   Extremities: no bilateral LE edema noted.     Neuro: No focal deficits  Skin: Warm, without rashes      LABS/STUDIES  --------------------------------------------------------------------------------              11.4   2.08  >-----------<  244      [01-09-25 @ 07:15]              35.7     134  |  101  |  19  ----------------------------<  107      [01-09-25 @ 07:15]  4.1   |  20  |  1.16        Ca     9.5     [01-09-25 @ 07:15]      Mg     2.2     [01-09-25 @ 07:15]      Phos  3.4     [01-09-25 @ 07:15]    TPro  7.2  /  Alb  3.1  /  TBili  0.3  /  DBili  x   /  AST  39  /  ALT  39  /  AlkPhos  323  [01-09-25 @ 07:15]    Creatinine Trend:  SCr 1.16 [01-09 @ 07:15]  SCr 1.00 [01-08 @ 07:42]  SCr 1.03 [01-08 @ 02:30]  SCr 1.04 [01-08 @ 02:08]  SCr 1.02 [01-07 @ 18:08]      Urine Sodium 69      [01-06-25 @ 02:55]  Urine Osmolality 442      [01-05-25 @ 20:36]    Iron 16, TIBC 229, %sat 7      [01-07-25 @ 07:38]  Ferritin 192      [01-08-25 @ 07:42]  TSH 2.93      [01-06-25 @ 06:11]  Lipid: chol 198, , HDL 59, LDL --      [01-07-25 @ 07:38]    HBsAg Nonreact      [01-06-25 @ 06:11]  HBcAb Nonreact      [01-08-25 @ 07:42]  HCV 0.50, Nonreact      [01-06-25 @ 06:11]  HIV Nonreact      [01-06-25 @ 06:11]    Rheumatoid Factor 49      [01-08-25 @ 07:42]

## 2025-01-09 NOTE — PROGRESS NOTE ADULT - PROBLEM SELECTOR PLAN 1
presented with fever and severe neutropenia -  on admission --> 0 (1/6)  positive for JOSELUIS and RF in the past, not on any medications per son  Positive for Lyme serology back in 2022, however, negative confirmatory immunoblot IgG or IgM. Per PCP, no lyme disease or treatment outpatient.    Workup:   (+) Enterovirus. (+)EBV IgG/EBNA/VCA/EA. (+) CMV IgG. Low uric acid (2.1), elevated LDH (292), Hapto wnl  - elevated CCP and RF  - US with no splenomegaly  - s/p BMB      Plan  - c/w Zosyn, plan to transition to Cipro/Augmentin  - f/u XR of hands/feet   - f/u prelim BMB

## 2025-01-09 NOTE — PROGRESS NOTE ADULT - ASSESSMENT
79F with no report of PMHx and not on home medications, presented with fever and weakness. Found to have severe neutropenia, hyponatremia, and positive for Enterovirus. Rheumatology consulted given prior positive JOSELUIS and RF.     #Severe neutropenia   #Sepsis   #Enterorhinovirus   - Admitted for sepsis with absolute neutrophil count 0, procalcitonin elevated to 1.4, RVP positive enterorhinovirus   - Has prior neutropenia but workup never completed  - Suspect acute clinical presentation is likely due to infection, but underlying neutropenia needs further evaluation to r/o malignancy and other etiologies   - Overall clinically improving with antibiotics   - S/p bone marrow biopsy for further workup of neutropenia     #Positive RF and CCP   - RF 49,   - X-rays hands/wrists no erosions, x-rays feet/ankles b/l hammertoe deformities, no developed joint margin erosions   - No synovitis on exam, hammertoes noted   - US no splenomegaly. Lower suspicion of Felty's syndrome given lack of longstanding joint symptoms and lack of splenomegaly on exam     #JOSELUIS >1:2560 DFS70   - Checked in 2022 in context of cellulitis   - Pt has no stigmata of CTD on history or exam   - Additionally autoantibodies against dense fine speckles 70 (DFS70) are found in 10% of healthy individuals, but only in a tiny population of patients with autoimmune rheumatic disease. The antibody may thus be a marker of autoimmune rheumatic disease negativity   - Low suspicion for connective tissue disease at this time     Recommendations:   - Appreciate Hematology evaluation - s/p bone marrow biopsy, will f/u results  - Pt can follow up outpatient with Dr. Villalta at [place] if she develops joint symptoms     INCOMPLETE PLEASE WAIT    Diane Hansen MD  Rheumatology Fellow  Available on TEAMS        79F with no report of PMHx and not on home medications, presented with fever and weakness. Found to have severe neutropenia, hyponatremia, and positive for Enterovirus. Rheumatology consulted given prior positive JOSELUIS and RF.     #Severe neutropenia   #Sepsis   #Enterorhinovirus   - Admitted for sepsis with absolute neutrophil count 0, procalcitonin elevated to 1.4, RVP positive enterorhinovirus   - Has prior neutropenia but workup never completed  - Suspect acute clinical presentation is likely due to infection, but underlying neutropenia needs further evaluation to r/o malignancy and other etiologies   - Overall clinically improving with antibiotics   - S/p bone marrow biopsy for further workup of neutropenia     #Positive RF and CCP   - RF 49,   - X-rays hands/wrists no erosions, x-rays feet/ankles b/l hammertoe deformities, no developed joint margin erosions   - No synovitis on exam, hammertoes noted   - US no splenomegaly. Lower suspicion of Felty's syndrome given lack of longstanding joint symptoms and lack of splenomegaly on exam   - Pt with positive serologies but lacks joint symptoms; no indication for immunosuppression from rheumatology standpoint at this time     #JOSELUIS >1:2560 DFS70   - Checked in 2022 in context of cellulitis   - Pt has no stigmata of CTD on history or exam   - Additionally autoantibodies against dense fine speckles 70 (DFS70) are found in 10% of healthy individuals, but only in a tiny population of patients with autoimmune rheumatic disease. The antibody may thus be a marker of autoimmune rheumatic disease negativity   - Low suspicion for connective tissue disease at this time     Recommendations:   - Appreciate Hematology evaluation - s/p bone marrow biopsy, will f/u results  - Pt can follow up outpatient with Dr. Villalta at [place] if she develops joint symptoms     INCOMPLETE PLEASE WAIT    Diane Hansen MD  Rheumatology Fellow  Available on TEAMS        79F with no report of PMHx and not on home medications, presented with fever and weakness. Found to have severe neutropenia, hyponatremia, and positive for Enterovirus. Rheumatology consulted given prior positive JOSELUIS and RF.     #Severe neutropenia   #Sepsis   #Enterorhinovirus   - Admitted for sepsis with absolute neutrophil count 0, procalcitonin elevated to 1.4, RVP positive enterorhinovirus   - Has prior neutropenia but workup never completed  - Suspect acute clinical presentation is likely due to infection, but underlying neutropenia needs further evaluation to r/o malignancy and other etiologies   - Overall clinically improving with antibiotics   - S/p bone marrow biopsy for further workup of neutropenia     #Positive RF and CCP   - RF 49,   - X-rays hands/wrists no erosions, x-rays feet/ankles b/l hammertoe deformities, no developed joint margin erosions   - No synovitis on exam, hammertoes noted   - US no splenomegaly. Lower suspicion of Felty's syndrome given lack of longstanding joint symptoms and lack of splenomegaly on exam   - Pt with positive serologies but lacks joint symptoms; no indication for immunosuppression from rheumatology standpoint at this time     #JOSELUIS >1:2560 DFS70   - Checked in 2022 in context of cellulitis   - Pt has no stigmata of CTD on history or exam   - Additionally autoantibodies against dense fine speckles 70 (DFS70) are found in 10% of healthy individuals, but only in a tiny population of patients with autoimmune rheumatic disease. The antibody may thus be a marker of autoimmune rheumatic disease negativity   - Low suspicion for connective tissue disease at this time     Recommendations:   - Appreciate Hematology evaluation - s/p bone marrow biopsy, will f/u results  - Pt can follow up outpatient with Dr. Villalta at [place] if she develops joint symptoms     Rheumatology to sign off. Please call back if any questions or concerns    Discussed with son at bedside   Discussed with primary team   Discussed with Dr. Pawan Hansen MD  Rheumatology Fellow  Available on TEAMS        79F with no report of PMHx and not on home medications, presented with fever and weakness. Found to have severe neutropenia, hyponatremia, and positive for Enterovirus. Rheumatology consulted given prior positive JOSELUIS and RF.     #Severe neutropenia   #Sepsis   #Enterorhinovirus   - Admitted for sepsis with absolute neutrophil count 0, procalcitonin elevated to 1.4, RVP positive enterorhinovirus   - Has prior neutropenia but workup never completed  - Suspect acute clinical presentation is likely due to infection, but underlying neutropenia needs further evaluation to r/o malignancy and other etiologies   - Overall clinically improving with antibiotics   - S/p bone marrow biopsy for further workup of neutropenia     #Positive RF and CCP   - RF 49,   - X-rays hands/wrists no erosions, x-rays feet/ankles b/l hammertoe deformities, no developed joint margin erosions   - No synovitis on exam, hammertoes noted   - US no splenomegaly. Lower suspicion of Felty's syndrome given lack of longstanding joint symptoms and lack of splenomegaly on exam   - Pt with positive serologies but lacks joint symptoms; no indication for immunosuppression from rheumatology standpoint at this time     #JOSELUIS >1:2560 DFS70   - Checked in 2022 in context of cellulitis   - Pt has no stigmata of CTD on history or exam   - Additionally autoantibodies against dense fine speckles 70 (DFS70) are found in 10% of healthy individuals, but only in a tiny population of patients with autoimmune rheumatic disease. The antibody may thus be a marker of autoimmune rheumatic disease negativity   - Low suspicion for connective tissue disease at this time     Recommendations:   - Appreciate Hematology evaluation - s/p bone marrow biopsy, will f/u results  - Pt can follow up outpatient with Dr. Villalta at [place] if she develops joint symptoms - these were reviewed with pt and son at bedside today     Rheumatology to sign off. Please call back if any questions or concerns    Discussed with son at bedside   Discussed with primary team   Discussed with Dr. Pawan Hansen MD  Rheumatology Fellow  Available on TEAMS

## 2025-01-09 NOTE — PROGRESS NOTE ADULT - SUBJECTIVE AND OBJECTIVE BOX
PAVEL LEYVA  [id number]    INTERVAL HPI/OVERNIGHT EVENTS: No acute events.     MEDICATIONS  (STANDING):  heparin   Injectable 5000 Unit(s) SubCutaneous every 12 hours  piperacillin/tazobactam IVPB.. 3.375 Gram(s) IV Intermittent every 8 hours    MEDICATIONS  (PRN):  acetaminophen     Tablet .. 650 milliGRAM(s) Oral every 6 hours PRN Temp greater or equal to 38C (100.4F), Mild Pain (1 - 3)  aluminum hydroxide/magnesium hydroxide/simethicone Suspension 30 milliLiter(s) Oral every 4 hours PRN Dyspepsia  melatonin 3 milliGRAM(s) Oral at bedtime PRN Insomnia  ondansetron Injectable 4 milliGRAM(s) IV Push every 8 hours PRN Nausea and/or Vomiting      Allergies    No Known Allergies    Intolerances        Review of Systems:   as above       Vital Signs Last 24 Hrs  T(C): 36.7 (09 Jan 2025 10:18), Max: 37.2 (09 Jan 2025 00:04)  T(F): 98.1 (09 Jan 2025 10:18), Max: 99 (09 Jan 2025 00:04)  HR: 71 (09 Jan 2025 10:18) (67 - 75)  BP: 141/69 (09 Jan 2025 10:18) (141/69 - 169/78)  BP(mean): --  RR: 17 (09 Jan 2025 10:18) (16 - 17)  SpO2: 98% (09 Jan 2025 10:18) (97% - 99%)    Parameters below as of 09 Jan 2025 10:18  Patient On (Oxygen Delivery Method): room air        Physical Exam:  General: No apparent distress, alert   HEENT: EOMI, MMM  CVS: +S1/S2, RRR, no murmurs  Resp: CTA b/l. No crackles/wheezing  GI: Soft, NT/ND +BS, no splenomegaly   MSK: hammertoes noted, no swelling/warmth/erythema of the joints of the UE/LE, muscle strength intact   Neuro: AAOx3  Skin: no visible rashes      LABS:                        11.4   2.08  )-----------( 244      ( 09 Jan 2025 07:15 )             35.7     01-09    134[L]  |  101  |  19  ----------------------------<  107[H]  4.1   |  20[L]  |  1.16    Ca    9.5      09 Jan 2025 07:15  Phos  3.4     01-09  Mg     2.2     01-09    TPro  7.2  /  Alb  3.1[L]  /  TBili  0.3  /  DBili  x   /  AST  39  /  ALT  39  /  AlkPhos  323[H]  01-09      Urinalysis Basic - ( 09 Jan 2025 07:15 )    Color: x / Appearance: x / SG: x / pH: x  Gluc: 107 mg/dL / Ketone: x  / Bili: x / Urobili: x   Blood: x / Protein: x / Nitrite: x   Leuk Esterase: x / RBC: x / WBC x   Sq Epi: x / Non Sq Epi: x / Bacteria: x    Rheumatoid Factor Quant, Serum or Plasma in AM (01.08.25 @ 07:42)   Rheumatoid Factor Quant, Serum or Plasma: 49 IU/mLCyclic Citrullinated Peptide AB (01.08.25 @ 07:42)   Cyclic Cit Pep Result: 281.0: Effective 7/30/2024, the assay methodology for anti-CCP testing changed   from enzyme-linked immunosorbent assay to electrochemiluminescence   immunoassay.   This result was obtained using the Roche Elecsys anti-CCP assay. Results   from other manufacturers' assays cannot be directly compared due to   differences in immunoassay design and antibody epitope recognition. U/mL  CCP Antibody IgG Interpretation: Positive      RADIOLOGY & ADDITIONAL TESTS:    < from: US Spleen (01.07.25 @ 21:57) >  ACC: [id number] EXAM:  US SPLEEN   ORDERED BY:  TERE MURPHY DATE:  01/07/2025          INTERPRETATION:  CLINICAL INDICATION: Neutropenic fever. Evaluate for   splenomegaly    EXAMINATION: Ultrasound of the spleen    COMPARISON: CT abdomen pelvis 2/2/2022    FINDINGS/  IMPRESSION:    Spleen measures 9.7 cm, within normal limits. Evaluation of the   parenchyma of the spleen is limited due to shadowing artifact. Question   trace perisplenic fluid.    --- End of Report ---            FELIX WOODS M.D., ATTENDING RADIOLOGIST  This document has been electronically signed. Jan 8 2025  6:45AM    < end of copied text >  < from: Xray Hand 2 Views, Bilateral (01.09.25 @ 09:47) >  ACC: [id number] EXAM:  XR WRIST COMP MIN 3 VIEWS BI   ORDERED BY:  TERE JIMÉNEZ     ACC: [id number] EXAM:  XR HAND 2 VIEWS BI   ORDERED BY:  TERE JIMÉNEZ     PROCEDURE DATE:  01/09/2025          INTERPRETATION:  CLINICAL INDICATION: bilateral hand and wrist pain    EXAM:  Frontal, oblique, and lateral views of both hands and wrists from   1/9/2025 at 0947. No similar prior studies available for comparison.    IMPRESSION:  Chronic ossicle in right TFC region. No dislocations or acute appearing   fractures.    Preserved joint spaces and no developed joint margin erosions.    Carpal bones normally aligned.    Generalized osteopenia otherwise no discrete suspicious lytic or blastic   lesions.    --- End of Report ---            TAYLOR BARKLEY MD; Attending Radiologist  This document has been electronically signed. Jan 9 2025 10:38AM    < end of copied text >  < from: Xray Ankle Complete 3 Views, Bilateral (01.09.25 @ 09:48) >    ACC: [id number] EXAM:  XR ANKLE COMP MIN 3 VIEWS BI   ORDERED BY:  TERE JIMÉNEZ     ACC: [id number] EXAM:  XR FOOT 2 VIEWS BI   ORDERED BY:  TERE JIMÉNEZ     PROCEDURE DATE:  01/09/2025          INTERPRETATION:  CLINICAL INDICATION: bilateral ankle and foot pain    EXAM:  Frontal oblique lateral views of both ankles and feet from 1/9/2025 at   0948. Compared to prior study from 7/9/2022.    IMPRESSION:  No acute fractures or dislocations.    Bilaterally congruent ankle mortises with smooth intact talar domes.   Tarsometatarsal alignment maintained without evidence for a Lisfranc   injury.    Slight bilateral hallux valgus deformities with small bunions. Bilateral   hammertoe deformities. 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   Preserved remaining joint spaces and no developed joint margin erosions.    Slight posterosuperior calcaneal Ludivina deformities. Small plantar   calcaneal enthesophytes.    Generalized osteopenia otherwise no discrete lytic or blastic lesions.    --- End of Report ---            TAYLOR BARKLEY MD; Attending Radiologist  This document has been electronically signed. Jan 9 2025 10:35AM    < end of copied text >   PAVEL LEYVA  [id number]    INTERVAL HPI/OVERNIGHT EVENTS: No acute events. Seen with son at bedside, who provided translation. Denies joint pain/swelling/erythema/warmth, no AM stiffness. Feels less weak. No further fevers.     MEDICATIONS  (STANDING):  heparin   Injectable 5000 Unit(s) SubCutaneous every 12 hours  piperacillin/tazobactam IVPB.. 3.375 Gram(s) IV Intermittent every 8 hours    MEDICATIONS  (PRN):  acetaminophen     Tablet .. 650 milliGRAM(s) Oral every 6 hours PRN Temp greater or equal to 38C (100.4F), Mild Pain (1 - 3)  aluminum hydroxide/magnesium hydroxide/simethicone Suspension 30 milliLiter(s) Oral every 4 hours PRN Dyspepsia  melatonin 3 milliGRAM(s) Oral at bedtime PRN Insomnia  ondansetron Injectable 4 milliGRAM(s) IV Push every 8 hours PRN Nausea and/or Vomiting      Allergies    No Known Allergies    Intolerances        Review of Systems:   as above       Vital Signs Last 24 Hrs  T(C): 36.7 (09 Jan 2025 10:18), Max: 37.2 (09 Jan 2025 00:04)  T(F): 98.1 (09 Jan 2025 10:18), Max: 99 (09 Jan 2025 00:04)  HR: 71 (09 Jan 2025 10:18) (67 - 75)  BP: 141/69 (09 Jan 2025 10:18) (141/69 - 169/78)  BP(mean): --  RR: 17 (09 Jan 2025 10:18) (16 - 17)  SpO2: 98% (09 Jan 2025 10:18) (97% - 99%)    Parameters below as of 09 Jan 2025 10:18  Patient On (Oxygen Delivery Method): room air        Physical Exam:  General: No apparent distress, alert   HEENT: EOMI, MMM  CVS: +S1/S2, RRR, no murmurs  Resp: CTA b/l. No crackles/wheezing  GI: Soft, NT/ND +BS, no splenomegaly   MSK: hammertoes noted, no swelling/warmth/erythema of the joints of the UE/LE, muscle strength intact   Neuro: AAOx3  Skin: no visible rashes      LABS:                        11.4   2.08  )-----------( 244      ( 09 Jan 2025 07:15 )             35.7     01-09    134[L]  |  101  |  19  ----------------------------<  107[H]  4.1   |  20[L]  |  1.16    Ca    9.5      09 Jan 2025 07:15  Phos  3.4     01-09  Mg     2.2     01-09    TPro  7.2  /  Alb  3.1[L]  /  TBili  0.3  /  DBili  x   /  AST  39  /  ALT  39  /  AlkPhos  323[H]  01-09      Urinalysis Basic - ( 09 Jan 2025 07:15 )    Color: x / Appearance: x / SG: x / pH: x  Gluc: 107 mg/dL / Ketone: x  / Bili: x / Urobili: x   Blood: x / Protein: x / Nitrite: x   Leuk Esterase: x / RBC: x / WBC x   Sq Epi: x / Non Sq Epi: x / Bacteria: x    Rheumatoid Factor Quant, Serum or Plasma in AM (01.08.25 @ 07:42)   Rheumatoid Factor Quant, Serum or Plasma: 49 IU/mLCyclic Citrullinated Peptide AB (01.08.25 @ 07:42)   Cyclic Cit Pep Result: 281.0: Effective 7/30/2024, the assay methodology for anti-CCP testing changed   from enzyme-linked immunosorbent assay to electrochemiluminescence   immunoassay.   This result was obtained using the Roche Elecsys anti-CCP assay. Results   from other manufacturers' assays cannot be directly compared due to   differences in immunoassay design and antibody epitope recognition. U/mL  CCP Antibody IgG Interpretation: Positive      RADIOLOGY & ADDITIONAL TESTS:    < from: US Spleen (01.07.25 @ 21:57) >  ACC: [id number] EXAM:  US SPLEEN   ORDERED BY:  TERE MURPHY DATE:  01/07/2025          INTERPRETATION:  CLINICAL INDICATION: Neutropenic fever. Evaluate for   splenomegaly    EXAMINATION: Ultrasound of the spleen    COMPARISON: CT abdomen pelvis 2/2/2022    FINDINGS/  IMPRESSION:    Spleen measures 9.7 cm, within normal limits. Evaluation of the   parenchyma of the spleen is limited due to shadowing artifact. Question   trace perisplenic fluid.    --- End of Report ---            FELIX WOODS M.D., ATTENDING RADIOLOGIST  This document has been electronically signed. Jan 8 2025  6:45AM    < end of copied text >  < from: Xray Hand 2 Views, Bilateral (01.09.25 @ 09:47) >  ACC: [id number] EXAM:  XR WRIST COMP MIN 3 VIEWS BI   ORDERED BY:  TERE JIMÉNEZ     ACC: [id number] EXAM:  XR HAND 2 VIEWS BI   ORDERED BY:  TERE JIMÉNEZ     PROCEDURE DATE:  01/09/2025          INTERPRETATION:  CLINICAL INDICATION: bilateral hand and wrist pain    EXAM:  Frontal, oblique, and lateral views of both hands and wrists from   1/9/2025 at 0947. No similar prior studies available for comparison.    IMPRESSION:  Chronic ossicle in right TFC region. No dislocations or acute appearing   fractures.    Preserved joint spaces and no developed joint margin erosions.    Carpal bones normally aligned.    Generalized osteopenia otherwise no discrete suspicious lytic or blastic   lesions.    --- End of Report ---            TAYLOR BARKLEY MD; Attending Radiologist  This document has been electronically signed. Jan 9 2025 10:38AM    < end of copied text >  < from: Xray Ankle Complete 3 Views, Bilateral (01.09.25 @ 09:48) >    ACC: [id number] EXAM:  XR ANKLE COMP MIN 3 VIEWS BI   ORDERED BY:  TERE JIMÉNEZ     ACC: [id number] EXAM:  XR FOOT 2 VIEWS BI   ORDERED BY:  TERE JIMÉNEZ     PROCEDURE DATE:  01/09/2025          INTERPRETATION:  CLINICAL INDICATION: bilateral ankle and foot pain    EXAM:  Frontal oblique lateral views of both ankles and feet from 1/9/2025 at   0948. Compared to prior study from 7/9/2022.    IMPRESSION:  No acute fractures or dislocations.    Bilaterally congruent ankle mortises with smooth intact talar domes.   Tarsometatarsal alignment maintained without evidence for a Lisfranc   injury.    Slight bilateral hallux valgus deformities with small bunions. Bilateral   hammertoe deformities. 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   Preserved remaining joint spaces and no developed joint margin erosions.    Slight posterosuperior calcaneal Ludivina deformities. Small plantar   calcaneal enthesophytes.    Generalized osteopenia otherwise no discrete lytic or blastic lesions.    --- End of Report ---            TAYLOR BARKLEY MD; Attending Radiologist  This document has been electronically signed. Jan 9 2025 10:35AM    < end of copied text >   PAVEL LEYVA  [id number]    INTERVAL HPI/OVERNIGHT EVENTS: No acute events. Seen with son at bedside, who provided translation. Denies joint pain/swelling/erythema/warmth, no AM stiffness. Feels less weak. No further fevers.     MEDICATIONS  (STANDING):  heparin   Injectable 5000 Unit(s) SubCutaneous every 12 hours  piperacillin/tazobactam IVPB.. 3.375 Gram(s) IV Intermittent every 8 hours    MEDICATIONS  (PRN):  acetaminophen     Tablet .. 650 milliGRAM(s) Oral every 6 hours PRN Temp greater or equal to 38C (100.4F), Mild Pain (1 - 3)  aluminum hydroxide/magnesium hydroxide/simethicone Suspension 30 milliLiter(s) Oral every 4 hours PRN Dyspepsia  melatonin 3 milliGRAM(s) Oral at bedtime PRN Insomnia  ondansetron Injectable 4 milliGRAM(s) IV Push every 8 hours PRN Nausea and/or Vomiting      Allergies    No Known Allergies    Intolerances    Review of Systems:   as above     Vital Signs Last 24 Hrs  T(C): 36.7 (09 Jan 2025 10:18), Max: 37.2 (09 Jan 2025 00:04)  T(F): 98.1 (09 Jan 2025 10:18), Max: 99 (09 Jan 2025 00:04)  HR: 71 (09 Jan 2025 10:18) (67 - 75)  BP: 141/69 (09 Jan 2025 10:18) (141/69 - 169/78)  RR: 17 (09 Jan 2025 10:18) (16 - 17)  SpO2: 98% (09 Jan 2025 10:18) (97% - 99%)    Parameters below as of 09 Jan 2025 10:18  Patient On (Oxygen Delivery Method): room air    Physical Exam:  General: No apparent distress, alert   HEENT: EOMI, MMM  CVS: +S1/S2, RRR, no murmurs  Resp: CTA b/l. No crackles/wheezing  GI: Soft, NT/ND +BS, no splenomegaly   MSK: hammertoes noted, no swelling/warmth/erythema of the joints of the UE/LE, muscle strength intact   Neuro: AAOx3  Skin: no visible rashes      LABS:                        11.4   2.08  )-----------( 244      ( 09 Jan 2025 07:15 )             35.7     01-09    134[L]  |  101  |  19  ----------------------------<  107[H]  4.1   |  20[L]  |  1.16    Ca    9.5      09 Jan 2025 07:15  Phos  3.4     01-09  Mg     2.2     01-09    TPro  7.2  /  Alb  3.1[L]  /  TBili  0.3  /  DBili  x   /  AST  39  /  ALT  39  /  AlkPhos  323[H]  01-09      Urinalysis Basic - ( 09 Jan 2025 07:15 )    Color: x / Appearance: x / SG: x / pH: x  Gluc: 107 mg/dL / Ketone: x  / Bili: x / Urobili: x   Blood: x / Protein: x / Nitrite: x   Leuk Esterase: x / RBC: x / WBC x   Sq Epi: x / Non Sq Epi: x / Bacteria: x    Rheumatoid Factor Quant, Serum or Plasma in AM (01.08.25 @ 07:42)   Rheumatoid Factor Quant, Serum or Plasma: 49 IU/mLCyclic Citrullinated Peptide AB (01.08.25 @ 07:42)   Cyclic Cit Pep Result: 281.0: Effective 7/30/2024, the assay methodology for anti-CCP testing changed   from enzyme-linked immunosorbent assay to electrochemiluminescence   immunoassay.   This result was obtained using the Roche Elecsys anti-CCP assay. Results   from other manufacturers' assays cannot be directly compared due to   differences in immunoassay design and antibody epitope recognition. U/mL  CCP Antibody IgG Interpretation: Positive    RADIOLOGY & ADDITIONAL TESTS:    < from: US Spleen (01.07.25 @ 21:57) >  ACC: [id number] EXAM:  US SPLEEN   ORDERED BY:  TERE MURPHY DATE:  01/07/2025          INTERPRETATION:  CLINICAL INDICATION: Neutropenic fever. Evaluate for   splenomegaly    EXAMINATION: Ultrasound of the spleen    COMPARISON: CT abdomen pelvis 2/2/2022    FINDINGS/  IMPRESSION:    Spleen measures 9.7 cm, within normal limits. Evaluation of the   parenchyma of the spleen is limited due to shadowing artifact. Question   trace perisplenic fluid.    --- End of Report ---    FELIX WOODS M.D., ATTENDING RADIOLOGIST  This document has been electronically signed. Jan 8 2025  6:45AM    < end of copied text >  < from: Xray Hand 2 Views, Bilateral (01.09.25 @ 09:47) >  ACC: [id number] EXAM:  XR WRIST COMP MIN 3 VIEWS BI   ORDERED BY:  TERE JIMÉNEZ     ACC: [id number] EXAM:  XR HAND 2 VIEWS BI   ORDERED BY:  TERE JIMÉNEZ     PROCEDURE DATE:  01/09/2025      INTERPRETATION:  CLINICAL INDICATION: bilateral hand and wrist pain    EXAM:  Frontal, oblique, and lateral views of both hands and wrists from   1/9/2025 at 0947. No similar prior studies available for comparison.    IMPRESSION:  Chronic ossicle in right TFC region. No dislocations or acute appearing   fractures.    Preserved joint spaces and no developed joint margin erosions.    Carpal bones normally aligned.    Generalized osteopenia otherwise no discrete suspicious lytic or blastic   lesions.    --- End of Report ---    TAYLOR BARKLEY MD; Attending Radiologist  This document has been electronically signed. Jan 9 2025 10:38AM    < end of copied text >  < from: Xray Ankle Complete 3 Views, Bilateral (01.09.25 @ 09:48) >    ACC: [id number] EXAM:  XR ANKLE COMP MIN 3 VIEWS BI   ORDERED BY:  TERE JIMÉNEZ     ACC: [id number] EXAM:  XR FOOT 2 VIEWS BI   ORDERED BY:  TERE JIMÉNEZ     PROCEDURE DATE:  01/09/2025          INTERPRETATION:  CLINICAL INDICATION: bilateral ankle and foot pain    EXAM:  Frontal oblique lateral views of both ankles and feet from 1/9/2025 at   0948. Compared to prior study from 7/9/2022.    IMPRESSION:  No acute fractures or dislocations.    Bilaterally congruent ankle mortises with smooth intact talar domes.   Tarsometatarsal alignment maintained without evidence for a Lisfranc   injury.    Slight bilateral hallux valgus deformities with small bunions. Bilateral   hammertoe deformities. 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   Preserved remaining joint spaces and no developed joint margin erosions.    Slight posterosuperior calcaneal Ludivina deformities. Small plantar   calcaneal enthesophytes.    Generalized osteopenia otherwise no discrete lytic or blastic lesions.    --- End of Report ---    TAYLOR BARKLEY MD; Attending Radiologist  This document has been electronically signed. Jan 9 2025 10:35AM    < end of copied text >

## 2025-01-09 NOTE — PROGRESS NOTE ADULT - ASSESSMENT
80 y/o F reportedly no medical hx, per chart review has hx of hypothyroidism, RA, HTN, presents for 2 days of nasal congestion, clear rhinorrhea, cough, fever, a/w worsening lethargy and poor PO intake. Patient recently travelled to [facility], returned on 12/30. Now patient has neutropenic ?sepsis.     SNa was 122 at the time of admission. Pt received IV fluids and it improved to 130. Serum osmolality 250. Urine osm 442. Urine Na 55. Pt is not on any medications and has poor PO intake.     Hypo-osmolar hyponatremia:  Likely SIADH.   SNa improved to 129 and corrected  Na is 132, further improved to 134 and remained stable at 134.    Oral fluid restriction to <1L a day.   Encourage PO intake of food as osmolar load would improve the serum sodium.   Keep all drips to NS instead of D5.

## 2025-01-10 LAB
ALBUMIN SERPL ELPH-MCNC: 3.3 G/DL — SIGNIFICANT CHANGE UP (ref 3.3–5)
ALP SERPL-CCNC: 334 U/L — HIGH (ref 40–120)
ALT FLD-CCNC: 41 U/L — SIGNIFICANT CHANGE UP (ref 10–45)
ANION GAP SERPL CALC-SCNC: 12 MMOL/L — SIGNIFICANT CHANGE UP (ref 5–17)
AST SERPL-CCNC: 43 U/L — HIGH (ref 10–40)
BASOPHILS # BLD AUTO: 0.03 K/UL — SIGNIFICANT CHANGE UP (ref 0–0.2)
BASOPHILS NFR BLD AUTO: 1.2 % — SIGNIFICANT CHANGE UP (ref 0–2)
BCR/ABL BY RT - PCR QUANTITATIVE: SIGNIFICANT CHANGE UP
BILIRUB SERPL-MCNC: 0.4 MG/DL — SIGNIFICANT CHANGE UP (ref 0.2–1.2)
BUN SERPL-MCNC: 23 MG/DL — SIGNIFICANT CHANGE UP (ref 7–23)
CALCIUM SERPL-MCNC: 9.7 MG/DL — SIGNIFICANT CHANGE UP (ref 8.4–10.5)
CALRETICULIN INTERPRETATION: SIGNIFICANT CHANGE UP
CHLORIDE SERPL-SCNC: 101 MMOL/L — SIGNIFICANT CHANGE UP (ref 96–108)
CO2 SERPL-SCNC: 20 MMOL/L — LOW (ref 22–31)
CREAT ?TM UR-MCNC: 43 MG/DL — SIGNIFICANT CHANGE UP
CREAT SERPL-MCNC: 1.23 MG/DL — SIGNIFICANT CHANGE UP (ref 0.5–1.3)
CULTURE RESULTS: SIGNIFICANT CHANGE UP
CULTURE RESULTS: SIGNIFICANT CHANGE UP
EGFR: 44 ML/MIN/1.73M2 — LOW
EOSINOPHIL # BLD AUTO: 0.05 K/UL — SIGNIFICANT CHANGE UP (ref 0–0.5)
EOSINOPHIL NFR BLD AUTO: 2 % — SIGNIFICANT CHANGE UP (ref 0–6)
GLUCOSE BLDC GLUCOMTR-MCNC: 136 MG/DL — HIGH (ref 70–99)
GLUCOSE SERPL-MCNC: 109 MG/DL — HIGH (ref 70–99)
HCT VFR BLD CALC: 35.8 % — SIGNIFICANT CHANGE UP (ref 34.5–45)
HGB BLD-MCNC: 11.4 G/DL — LOW (ref 11.5–15.5)
LYMPHOCYTES # BLD AUTO: 1.41 K/UL — SIGNIFICANT CHANGE UP (ref 1–3.3)
LYMPHOCYTES # BLD AUTO: 57.8 % — HIGH (ref 13–44)
MAGNESIUM SERPL-MCNC: 2.4 MG/DL — SIGNIFICANT CHANGE UP (ref 1.6–2.6)
MCHC RBC-ENTMCNC: 26.9 PG — LOW (ref 27–34)
MCHC RBC-ENTMCNC: 31.8 G/DL — LOW (ref 32–36)
MCV RBC AUTO: 84.4 FL — SIGNIFICANT CHANGE UP (ref 80–100)
MONOCYTES # BLD AUTO: 0.89 K/UL — SIGNIFICANT CHANGE UP (ref 0–0.9)
MONOCYTES NFR BLD AUTO: 36.5 % — HIGH (ref 2–14)
NEUTROPHILS # BLD AUTO: 0.06 K/UL — LOW (ref 1.8–7.4)
NEUTROPHILS NFR BLD AUTO: 2.5 % — LOW (ref 43–77)
NRBC # BLD: 0 /100 WBCS — SIGNIFICANT CHANGE UP (ref 0–0)
PHOSPHATE SERPL-MCNC: 3 MG/DL — SIGNIFICANT CHANGE UP (ref 2.5–4.5)
PLATELET # BLD AUTO: 265 K/UL — SIGNIFICANT CHANGE UP (ref 150–400)
POTASSIUM SERPL-MCNC: 4.3 MMOL/L — SIGNIFICANT CHANGE UP (ref 3.5–5.3)
POTASSIUM SERPL-SCNC: 4.3 MMOL/L — SIGNIFICANT CHANGE UP (ref 3.5–5.3)
PROT ?TM UR-MCNC: 9 MG/DL — SIGNIFICANT CHANGE UP (ref 0–12)
PROT SERPL-MCNC: 7.3 G/DL — SIGNIFICANT CHANGE UP (ref 6–8.3)
PROT/CREAT UR-RTO: 0.2 RATIO — SIGNIFICANT CHANGE UP (ref 0–0.2)
RBC # BLD: 4.24 M/UL — SIGNIFICANT CHANGE UP (ref 3.8–5.2)
RBC # FLD: 14 % — SIGNIFICANT CHANGE UP (ref 10.3–14.5)
SODIUM SERPL-SCNC: 133 MMOL/L — LOW (ref 135–145)
SODIUM UR-SCNC: 91 MMOL/L — SIGNIFICANT CHANGE UP
SPECIMEN SOURCE: SIGNIFICANT CHANGE UP
SPECIMEN SOURCE: SIGNIFICANT CHANGE UP
TM INTERPRETATION: SIGNIFICANT CHANGE UP
WBC # BLD: 2.44 K/UL — LOW (ref 3.8–10.5)
WBC # FLD AUTO: 2.44 K/UL — LOW (ref 3.8–10.5)

## 2025-01-10 PROCEDURE — 99232 SBSQ HOSP IP/OBS MODERATE 35: CPT | Mod: GC

## 2025-01-10 RX ADMIN — PIPERACILLIN AND TAZOBACTAM 25 GRAM(S): 3; .375 INJECTION, POWDER, LYOPHILIZED, FOR SOLUTION INTRAVENOUS at 14:15

## 2025-01-10 RX ADMIN — HEPARIN SODIUM 5000 UNIT(S): 1000 INJECTION, SOLUTION INTRAVENOUS; SUBCUTANEOUS at 18:53

## 2025-01-10 RX ADMIN — PIPERACILLIN AND TAZOBACTAM 25 GRAM(S): 3; .375 INJECTION, POWDER, LYOPHILIZED, FOR SOLUTION INTRAVENOUS at 06:25

## 2025-01-10 RX ADMIN — HEPARIN SODIUM 5000 UNIT(S): 1000 INJECTION, SOLUTION INTRAVENOUS; SUBCUTANEOUS at 06:25

## 2025-01-10 RX ADMIN — PIPERACILLIN AND TAZOBACTAM 25 GRAM(S): 3; .375 INJECTION, POWDER, LYOPHILIZED, FOR SOLUTION INTRAVENOUS at 21:41

## 2025-01-10 NOTE — PROGRESS NOTE ADULT - ASSESSMENT
79F with no report of PMHx and not on home medications, presented with fever and weakness. Found to have severe neutropenia, hyponatremia, and positive for Enterovirus. Admitted to medicine for further workup. Patient is empirically treated with Vanc and Zosyn. Heme was consulted, patient was s/p BMB.      Per chart review and collateral obtained from PCP  - Labs showed neutropenia in 2022, and followed Dr. Tonja Perry ([facility]), however, lost to follow up.  - diagnosed with RA in 2022, followed Dr. Lucy Almanzar at that time. Further workup indicated RA limited to feet, so NSAID was recommended with close monitor. However, patient lost to follow up subsequently  - Lyme serology positive in 2022, negative confirmatory immunoblot IgG and IgM. Serologic response to B. burgdorferi infection not detected, recommended for follow up in 7-14 days. However, patient lost to follow up

## 2025-01-10 NOTE — PROGRESS NOTE ADULT - SUBJECTIVE AND OBJECTIVE BOX
Patient is a 79y old  Female who presents with a chief complaint of Sepsis (09 Jan 2025 16:47)      SUBJECTIVE / OVERNIGHT EVENTS:  Over 24hrs event    Pt seen and examined at bedside. Had no complaints. Denied cp, sob, d/n/v    MEDICATIONS  (STANDING):  heparin   Injectable 5000 Unit(s) SubCutaneous every 12 hours  piperacillin/tazobactam IVPB.. 3.375 Gram(s) IV Intermittent every 8 hours    MEDICATIONS  (PRN):  acetaminophen     Tablet .. 650 milliGRAM(s) Oral every 6 hours PRN Temp greater or equal to 38C (100.4F), Mild Pain (1 - 3)  aluminum hydroxide/magnesium hydroxide/simethicone Suspension 30 milliLiter(s) Oral every 4 hours PRN Dyspepsia  melatonin 3 milliGRAM(s) Oral at bedtime PRN Insomnia  ondansetron Injectable 4 milliGRAM(s) IV Push every 8 hours PRN Nausea and/or Vomiting      CAPILLARY BLOOD GLUCOSE        I&O's Summary      Vital Signs Last 24 Hrs  T(C): 36.9 (10 Arvind 2025 04:22), Max: 36.9 (10 Arvind 2025 04:22)  T(F): 98.4 (10 Arvind 2025 04:22), Max: 98.4 (10 Arvind 2025 04:22)  HR: 71 (10 Arvind 2025 04:22) (65 - 72)  BP: 134/63 (10 Arvind 2025 04:22) (134/63 - 149/66)  BP(mean): --  RR: 16 (10 Arvind 2025 04:22) (16 - 17)  SpO2: 97% (10 Arvind 2025 04:22) (97% - 99%)    Parameters below as of 10 Arvind 2025 04:22  Patient On (Oxygen Delivery Method): room air        Physical Exam  CONSTITUTIONAL: NAD  EYES: EOMI, conjunctiva and sclera clear  ENMT: Moist oral mucosa  NECK: Supple  RESPIRATORY: Breathing unlabored, CTAB  CARDIOVASCULAR: S1S2 no MRG  ABDOMEN: Nontender to palpation, normoactive bowel sounds, no rebound/guarding  MUSCULOSKELETAL: no clubbing or cyanosis of digits  NEUROLOGY: No focal deficits   SKIN: No rashes or lesions    LABS:                        11.4   2.08  )-----------( 244      ( 09 Jan 2025 07:15 )             35.7      01-09    134[L]  |  101  |  19  ----------------------------<  107[H]  4.1   |  20[L]  |  1.16    Ca    9.5      09 Jan 2025 07:15  Phos  3.4     01-09  Mg     2.2     01-09    TPro  7.2  /  Alb  3.1[L]  /  TBili  0.3  /  DBili  x   /  AST  39  /  ALT  39  /  AlkPhos  323[H]  01-09          Urinalysis Basic - ( 09 Jan 2025 07:15 )    Color: x / Appearance: x / SG: x / pH: x  Gluc: 107 mg/dL / Ketone: x  / Bili: x / Urobili: x   Blood: x / Protein: x / Nitrite: x   Leuk Esterase: x / RBC: x / WBC x   Sq Epi: x / Non Sq Epi: x / Bacteria: x        RADIOLOGY & ADDITIONAL TESTS:    Imaging Personally Reviewed:    Consultant(s) Notes Reviewed:      Care Discussed with Consultants/Other Providers:   Patient is a 79y old  Female who presents with a chief complaint of Sepsis (09 Jan 2025 16:47)      SUBJECTIVE / OVERNIGHT EVENTS:  Over 24hrs  - Underwent XR of hands and feet  - Clinically asymptomatic    Pt seen and examined at bedside. Had no complaints. Denied cp, sob, d/n/v    MEDICATIONS  (STANDING):  heparin   Injectable 5000 Unit(s) SubCutaneous every 12 hours  piperacillin/tazobactam IVPB.. 3.375 Gram(s) IV Intermittent every 8 hours    MEDICATIONS  (PRN):  acetaminophen     Tablet .. 650 milliGRAM(s) Oral every 6 hours PRN Temp greater or equal to 38C (100.4F), Mild Pain (1 - 3)  aluminum hydroxide/magnesium hydroxide/simethicone Suspension 30 milliLiter(s) Oral every 4 hours PRN Dyspepsia  melatonin 3 milliGRAM(s) Oral at bedtime PRN Insomnia  ondansetron Injectable 4 milliGRAM(s) IV Push every 8 hours PRN Nausea and/or Vomiting      CAPILLARY BLOOD GLUCOSE        I&O's Summary      Vital Signs Last 24 Hrs  T(C): 36.9 (10 Arvind 2025 04:22), Max: 36.9 (10 Arvind 2025 04:22)  T(F): 98.4 (10 Arvind 2025 04:22), Max: 98.4 (10 Arvind 2025 04:22)  HR: 71 (10 Arvind 2025 04:22) (65 - 72)  BP: 134/63 (10 Arvind 2025 04:22) (134/63 - 149/66)  BP(mean): --  RR: 16 (10 Arvind 2025 04:22) (16 - 17)  SpO2: 97% (10 Arvind 2025 04:22) (97% - 99%)    Parameters below as of 10 Arvind 2025 04:22  Patient On (Oxygen Delivery Method): room air        Physical Exam  CONSTITUTIONAL: NAD  EYES: EOMI, conjunctiva and sclera clear  ENMT: Moist oral mucosa  NECK: Supple  RESPIRATORY: Breathing unlabored, CTAB  CARDIOVASCULAR: S1S2 no MRG  ABDOMEN: Nontender to palpation, normoactive bowel sounds, no rebound/guarding  MUSCULOSKELETAL: no clubbing or cyanosis of digits  NEUROLOGY: No focal deficits   SKIN: No rashes or lesions    LABS:                        11.4   2.08  )-----------( 244      ( 09 Jan 2025 07:15 )             35.7      01-09    134[L]  |  101  |  19  ----------------------------<  107[H]  4.1   |  20[L]  |  1.16    Ca    9.5      09 Jan 2025 07:15  Phos  3.4     01-09  Mg     2.2     01-09    TPro  7.2  /  Alb  3.1[L]  /  TBili  0.3  /  DBili  x   /  AST  39  /  ALT  39  /  AlkPhos  323[H]  01-09          Urinalysis Basic - ( 09 Jan 2025 07:15 )    Color: x / Appearance: x / SG: x / pH: x  Gluc: 107 mg/dL / Ketone: x  / Bili: x / Urobili: x   Blood: x / Protein: x / Nitrite: x   Leuk Esterase: x / RBC: x / WBC x   Sq Epi: x / Non Sq Epi: x / Bacteria: x        RADIOLOGY & ADDITIONAL TESTS:    Imaging Personally Reviewed:    Consultant(s) Notes Reviewed:      Care Discussed with Consultants/Other Providers:   Patient is a 79y old  Female who presents with a chief complaint of Sepsis (09 Jan 2025 16:47)      SUBJECTIVE / OVERNIGHT EVENTS:  Over 24hrs  - Underwent XR of hands and feet  - Clinically asymptomatic    Pt seen and examined at bedside using  (ID# [id number]). Reported subjective fever yesterday evening. Endorsed mild pain on L upper extremities. Endorsed intermittent pain in BMB site. Denied cp, sob, d/n/v    MEDICATIONS  (STANDING):  heparin   Injectable 5000 Unit(s) SubCutaneous every 12 hours  piperacillin/tazobactam IVPB.. 3.375 Gram(s) IV Intermittent every 8 hours    MEDICATIONS  (PRN):  acetaminophen     Tablet .. 650 milliGRAM(s) Oral every 6 hours PRN Temp greater or equal to 38C (100.4F), Mild Pain (1 - 3)  aluminum hydroxide/magnesium hydroxide/simethicone Suspension 30 milliLiter(s) Oral every 4 hours PRN Dyspepsia  melatonin 3 milliGRAM(s) Oral at bedtime PRN Insomnia  ondansetron Injectable 4 milliGRAM(s) IV Push every 8 hours PRN Nausea and/or Vomiting      CAPILLARY BLOOD GLUCOSE        I&O's Summary      Vital Signs Last 24 Hrs  T(C): 36.9 (10 Arvind 2025 04:22), Max: 36.9 (10 Arvind 2025 04:22)  T(F): 98.4 (10 Arvind 2025 04:22), Max: 98.4 (10 Arvind 2025 04:22)  HR: 71 (10 Arvind 2025 04:22) (65 - 72)  BP: 134/63 (10 Arvind 2025 04:22) (134/63 - 149/66)  BP(mean): --  RR: 16 (10 Arvind 2025 04:22) (16 - 17)  SpO2: 97% (10 Arvind 2025 04:22) (97% - 99%)    Parameters below as of 10 Arvind 2025 04:22  Patient On (Oxygen Delivery Method): room air        Physical Exam  CONSTITUTIONAL: NAD  EYES: EOMI, conjunctiva and sclera clear  ENMT: Moist oral mucosa  NECK: Supple  RESPIRATORY: Breathing unlabored, CTAB. Decreased breath sounds in lower lobes b/l  CARDIOVASCULAR: S1S2 no MRG  ABDOMEN: Nontender to palpation, normoactive bowel sounds, no rebound/guarding  MUSCULOSKELETAL: no clubbing or cyanosis of digits. Mild erythema in LUE  NEUROLOGY: No focal deficits   SKIN: No rashes or lesions    LABS:                        11.4   2.08  )-----------( 244      ( 09 Jan 2025 07:15 )             35.7      01-09    134[L]  |  101  |  19  ----------------------------<  107[H]  4.1   |  20[L]  |  1.16    Ca    9.5      09 Jan 2025 07:15  Phos  3.4     01-09  Mg     2.2     01-09    TPro  7.2  /  Alb  3.1[L]  /  TBili  0.3  /  DBili  x   /  AST  39  /  ALT  39  /  AlkPhos  323[H]  01-09          Urinalysis Basic - ( 09 Jan 2025 07:15 )    Color: x / Appearance: x / SG: x / pH: x  Gluc: 107 mg/dL / Ketone: x  / Bili: x / Urobili: x   Blood: x / Protein: x / Nitrite: x   Leuk Esterase: x / RBC: x / WBC x   Sq Epi: x / Non Sq Epi: x / Bacteria: x        RADIOLOGY & ADDITIONAL TESTS:    Imaging Personally Reviewed:    Consultant(s) Notes Reviewed:      Care Discussed with Consultants/Other Providers:

## 2025-01-10 NOTE — PROGRESS NOTE ADULT - SUBJECTIVE AND OBJECTIVE BOX
Division of Kidney Diseases & Hypertension  FOLLOW UP NOTE  [phone]--------------------------------------------------------------------------------  Chief Complaint: Hyponatremia    24 hour events/subjective: Pt was seen and examined earlier today. No acute overnight events. No new complaints. Pt reports feeling well. Itching in the vaginal area +  Pt denies SOB/ Constipation/ Diarrhea/ Nausea/ Vomiting/ abdominal pain/ chest pain/ tingling/ numbness.         PAST HISTORY  --------------------------------------------------------------------------------  No significant changes to PMH, PSH, FHx, SHx, unless otherwise noted    ALLERGIES & MEDICATIONS  --------------------------------------------------------------------------------  Allergies    No Known Allergies    Intolerances      Standing Inpatient Medications  heparin   Injectable 5000 Unit(s) SubCutaneous every 12 hours  piperacillin/tazobactam IVPB.. 3.375 Gram(s) IV Intermittent every 8 hours    PRN Inpatient Medications  acetaminophen     Tablet .. 650 milliGRAM(s) Oral every 6 hours PRN  aluminum hydroxide/magnesium hydroxide/simethicone Suspension 30 milliLiter(s) Oral every 4 hours PRN  melatonin 3 milliGRAM(s) Oral at bedtime PRN  ondansetron Injectable 4 milliGRAM(s) IV Push every 8 hours PRN      REVIEW OF SYSTEMS  --------------------------------------------------------------------------------  as noted above.     VITALS/PHYSICAL EXAM  --------------------------------------------------------------------------------  T(C): 37.1 (01-10-25 @ 10:00), Max: 37.1 (01-10-25 @ 10:00)  HR: 77 (01-10-25 @ 10:00) (65 - 77)  BP: 142/61 (01-10-25 @ 10:00) (134/63 - 149/66)  RR: 18 (01-10-25 @ 10:00) (16 - 18)  SpO2: 98% (01-10-25 @ 10:00) (97% - 99%)  Wt(kg): --    Physical exam:  Gen: NAD, well-appearing  HEENT: PERRL, supple neck, clear oropharynx  Pulm: CTA B/L  CV: RRR, S1S2;  Back: No spinal or CVA tenderness  Abd: +BS, soft, nontender/nondistended  : No suprapubic tenderness   Extremities: no bilateral LE edema noted.     Neuro: No focal deficits  Skin: Warm, without rashes          LABS/STUDIES  --------------------------------------------------------------------------------              11.4   2.44  >-----------<  265      [01-10-25 @ 08:36]              35.8     133  |  101  |  23  ----------------------------<  109      [01-10-25 @ 07:45]  4.3   |  20  |  1.23        Ca     9.7     [01-10-25 @ 07:45]      Mg     2.4     [01-10-25 @ 07:45]      Phos  3.0     [01-10-25 @ 07:45]    TPro  7.3  /  Alb  3.3  /  TBili  0.4  /  DBili  x   /  AST  43  /  ALT  41  /  AlkPhos  334  [01-10-25 @ 07:45]    Creatinine Trend:  SCr 1.23 [01-10 @ 07:45]  SCr 1.16 [01-09 @ 07:15]  SCr 1.00 [01-08 @ 07:42]  SCr 1.03 [01-08 @ 02:30]  SCr 1.04 [01-08 @ 02:08]    Urine Sodium 69      [01-06-25 @ 02:55]  Urine Osmolality 442      [01-05-25 @ 20:36]    Iron 16, TIBC 229, %sat 7      [01-07-25 @ 07:38]  Ferritin 192      [01-08-25 @ 07:42]  TSH 2.93      [01-06-25 @ 06:11]  Lipid: chol 198, , HDL 59, LDL --      [01-07-25 @ 07:38]    HBsAg Nonreact      [01-06-25 @ 06:11]  HBcAb Nonreact      [01-08-25 @ 07:42]  HCV 0.50, Nonreact      [01-06-25 @ 06:11]  HIV Nonreact      [01-06-25 @ 06:11]    Rheumatoid Factor 49      [01-08-25 @ 07:42]

## 2025-01-10 NOTE — PROGRESS NOTE ADULT - PROBLEM SELECTOR PLAN 1
presented with fever and severe neutropenia -  on admission --> 0 (1/6)  positive for JOSELUIS and RF in the past, not on any medications per son  Positive for Lyme serology back in 2022, however, negative confirmatory immunoblot IgG or IgM. Per PCP, no lyme disease or treatment outpatient.    Workup:   (+) Enterovirus. (+)EBV IgG/EBNA/VCA/EA. (+) CMV IgG. Low uric acid (2.1), elevated LDH (292), Hapto wnl  - elevated CCP and RF  - US with no splenomegaly  - s/p BMB      Plan  - c/w Zosyn, plan to transition to Cipro/Augmentin  - f/u XR of hands/feet   - f/u prelim BMB presented with fever and severe neutropenia -  on admission --> 0 (1/6)  positive for JOSELUIS and RF in the past, not on any medications per son  Positive for Lyme serology back in 2022, however, negative confirmatory immunoblot IgG or IgM. Per PCP, no lyme disease diagnosis or treatment outpatient.    Workup:   (+) Enterovirus. (+)EBV IgG/EBNA/VCA/EA. (+) CMV IgG. Low uric acid (2.1), elevated LDH (292), Hapto wnl  - elevated CCP and RF  - US with no splenomegaly  - s/p BMB    Plan  - c/w Zosyn, plan to transition to Cipro/Augmentin on discharge  - f/u XR of hands/feet   - f/u prelim BMB

## 2025-01-10 NOTE — PROGRESS NOTE ADULT - PROBLEM SELECTOR PLAN 2
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

## 2025-01-10 NOTE — PROGRESS NOTE ADULT - ASSESSMENT
80 y/o F reportedly no medical hx, per chart review has hx of hypothyroidism, RA, HTN, presents for 2 days of nasal congestion, clear rhinorrhea, cough, fever, a/w worsening lethargy and poor PO intake. Patient recently travelled to [facility], returned on 12/30. Now patient has neutropenic ?sepsis.     SNa was 122 at the time of admission. Pt received IV fluids and it improved to 130. Serum osmolality 250. Urine osm 442. Urine Na 55. Pt is not on any medications and has poor PO intake.     Hypo-osmolar hyponatremia:  Likely SIADH   SNa improved to 129 and corrected  Na is 132---> 134 ---> 133 today.   Oral fluid restriction to <1L a day.   Encourage PO intake of food as osmolar load would improve the serum sodium.   Keep all drips to NS instead of D5.       Nephrology is signing off. Please reconsult PRN.   Thanks for involving us in this patient care.

## 2025-01-11 LAB
ALBUMIN SERPL ELPH-MCNC: 3.2 G/DL — LOW (ref 3.3–5)
ALP SERPL-CCNC: 306 U/L — HIGH (ref 40–120)
ALT FLD-CCNC: 35 U/L — SIGNIFICANT CHANGE UP (ref 10–45)
ANION GAP SERPL CALC-SCNC: 13 MMOL/L — SIGNIFICANT CHANGE UP (ref 5–17)
AST SERPL-CCNC: 30 U/L — SIGNIFICANT CHANGE UP (ref 10–40)
BASOPHILS # BLD AUTO: 0.05 K/UL — SIGNIFICANT CHANGE UP (ref 0–0.2)
BASOPHILS NFR BLD AUTO: 2.6 % — HIGH (ref 0–2)
BILIRUB SERPL-MCNC: 0.3 MG/DL — SIGNIFICANT CHANGE UP (ref 0.2–1.2)
BUN SERPL-MCNC: 22 MG/DL — SIGNIFICANT CHANGE UP (ref 7–23)
CALCIUM SERPL-MCNC: 9.9 MG/DL — SIGNIFICANT CHANGE UP (ref 8.4–10.5)
CHLORIDE SERPL-SCNC: 100 MMOL/L — SIGNIFICANT CHANGE UP (ref 96–108)
CO2 SERPL-SCNC: 20 MMOL/L — LOW (ref 22–31)
CREAT SERPL-MCNC: 0.89 MG/DL — SIGNIFICANT CHANGE UP (ref 0.5–1.3)
EGFR: 66 ML/MIN/1.73M2 — SIGNIFICANT CHANGE UP
EOSINOPHIL # BLD AUTO: 0.09 K/UL — SIGNIFICANT CHANGE UP (ref 0–0.5)
EOSINOPHIL NFR BLD AUTO: 4.4 % — SIGNIFICANT CHANGE UP (ref 0–6)
GLUCOSE SERPL-MCNC: 110 MG/DL — HIGH (ref 70–99)
HCT VFR BLD CALC: 35.2 % — SIGNIFICANT CHANGE UP (ref 34.5–45)
HGB BLD-MCNC: 11.2 G/DL — LOW (ref 11.5–15.5)
LYMPHOCYTES # BLD AUTO: 0.96 K/UL — LOW (ref 1–3.3)
LYMPHOCYTES # BLD AUTO: 46.5 % — HIGH (ref 13–44)
MAGNESIUM SERPL-MCNC: 2.4 MG/DL — SIGNIFICANT CHANGE UP (ref 1.6–2.6)
MANUAL SMEAR VERIFICATION: SIGNIFICANT CHANGE UP
MCHC RBC-ENTMCNC: 26.8 PG — LOW (ref 27–34)
MCHC RBC-ENTMCNC: 31.8 G/DL — LOW (ref 32–36)
MCV RBC AUTO: 84.2 FL — SIGNIFICANT CHANGE UP (ref 80–100)
MONOCYTES # BLD AUTO: 0.78 K/UL — SIGNIFICANT CHANGE UP (ref 0–0.9)
MONOCYTES NFR BLD AUTO: 37.7 % — HIGH (ref 2–14)
NEUTROPHILS # BLD AUTO: 0.16 K/UL — LOW (ref 1.8–7.4)
NEUTROPHILS NFR BLD AUTO: 5.3 % — LOW (ref 43–77)
NEUTS BAND # BLD: 2.6 % — SIGNIFICANT CHANGE UP (ref 0–8)
PHOSPHATE SERPL-MCNC: 3 MG/DL — SIGNIFICANT CHANGE UP (ref 2.5–4.5)
PLAT MORPH BLD: NORMAL — SIGNIFICANT CHANGE UP
PLATELET # BLD AUTO: 268 K/UL — SIGNIFICANT CHANGE UP (ref 150–400)
POTASSIUM SERPL-MCNC: 4 MMOL/L — SIGNIFICANT CHANGE UP (ref 3.5–5.3)
POTASSIUM SERPL-SCNC: 4 MMOL/L — SIGNIFICANT CHANGE UP (ref 3.5–5.3)
PROT SERPL-MCNC: 7.6 G/DL — SIGNIFICANT CHANGE UP (ref 6–8.3)
RBC # BLD: 4.18 M/UL — SIGNIFICANT CHANGE UP (ref 3.8–5.2)
RBC # FLD: 14 % — SIGNIFICANT CHANGE UP (ref 10.3–14.5)
RBC BLD AUTO: SIGNIFICANT CHANGE UP
SODIUM SERPL-SCNC: 133 MMOL/L — LOW (ref 135–145)
UUN UR-MCNC: 495 MG/DL — SIGNIFICANT CHANGE UP
VARIANT LYMPHS # BLD: 0.9 % — SIGNIFICANT CHANGE UP (ref 0–6)
WBC # BLD: 2.06 K/UL — LOW (ref 3.8–10.5)
WBC # FLD AUTO: 2.06 K/UL — LOW (ref 3.8–10.5)

## 2025-01-11 PROCEDURE — 99232 SBSQ HOSP IP/OBS MODERATE 35: CPT | Mod: GC

## 2025-01-11 RX ADMIN — PIPERACILLIN AND TAZOBACTAM 25 GRAM(S): 3; .375 INJECTION, POWDER, LYOPHILIZED, FOR SOLUTION INTRAVENOUS at 05:50

## 2025-01-11 RX ADMIN — HEPARIN SODIUM 5000 UNIT(S): 1000 INJECTION, SOLUTION INTRAVENOUS; SUBCUTANEOUS at 22:23

## 2025-01-11 RX ADMIN — PIPERACILLIN AND TAZOBACTAM 25 GRAM(S): 3; .375 INJECTION, POWDER, LYOPHILIZED, FOR SOLUTION INTRAVENOUS at 22:23

## 2025-01-11 RX ADMIN — HEPARIN SODIUM 5000 UNIT(S): 1000 INJECTION, SOLUTION INTRAVENOUS; SUBCUTANEOUS at 05:50

## 2025-01-11 RX ADMIN — PIPERACILLIN AND TAZOBACTAM 25 GRAM(S): 3; .375 INJECTION, POWDER, LYOPHILIZED, FOR SOLUTION INTRAVENOUS at 14:26

## 2025-01-11 NOTE — PROGRESS NOTE ADULT - PROBLEM SELECTOR PLAN 4
DVT: Lovenox  Diet: regular  Dispo: Appreciated PT eval - Home PT Reported symptoms for several months. Haven't been evaluated outpatient    Plan  - Fluconazole 150mg x1  - outpatient gyn f/u

## 2025-01-11 NOTE — PROGRESS NOTE ADULT - PROBLEM SELECTOR PLAN 2
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

## 2025-01-11 NOTE — PROGRESS NOTE ADULT - PROBLEM SELECTOR PLAN 1
presented with fever and severe neutropenia -  on admission --> 0 (1/6)  positive for JOSELUIS and RF in the past, not on any medications per son  Positive for Lyme serology back in 2022, however, negative confirmatory immunoblot IgG or IgM. Per PCP, no lyme disease diagnosis or treatment outpatient.    Workup:   (+) Enterovirus. (+)EBV IgG/EBNA/VCA/EA. (+) CMV IgG. Low uric acid (2.1), elevated LDH (292), Hapto wnl  - elevated CCP and RF  - US with no splenomegaly  - s/p BMB    Plan  - c/w Zosyn, plan to transition to Cipro/Augmentin on discharge  - f/u XR of hands/feet   - f/u prelim BMB presented with fever and severe neutropenia -  on admission --> 0 (1/6)  positive for JOSELUIS and RF in the past, not on any medications per son  Positive for Lyme serology back in 2022, however, negative confirmatory immunoblot IgG or IgM. Per PCP, no lyme disease diagnosis or treatment outpatient.    Workup:   (+) Enterovirus. (+)EBV IgG/EBNA/VCA/EA. (+) CMV IgG. 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 (+)EBV IgG/EBNA/VCA/EA. (+) CMV IgG. Low uric acid (2.1), elevated LDH (292), Hapto wnl  - elevated CCP, ESR, CRP. XRs negative for RA   - US with no splenomegaly.   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

## 2025-01-11 NOTE — PROGRESS NOTE ADULT - ASSESSMENT
79F with no report of PMHx and not on home medications, presented with fever and weakness. Found to have severe neutropenia, hyponatremia, and positive for Enterovirus. Admitted to medicine for further workup. Patient is empirically treated with Vanc and Zosyn. Heme was consulted, patient was s/p BMB.      Per chart review and collateral obtained from PCP  - Labs showed neutropenia in 2022, and followed Dr. Tonja Perry ([facility]), however, lost to follow up.  - diagnosed with RA in 2022, followed Dr. Lucy Almanzar at that time. Further workup indicated RA limited to feet, so NSAID was recommended with close monitor. However, patient lost to follow up subsequently  - Lyme serology positive in 2022, negative confirmatory immunoblot IgG and IgM. Serologic response to B. burgdorferi infection not detected, recommended for follow up in 7-14 days. However, patient lost to follow up 79F with no report of PMHx and not on home medications, presented with fever and weakness. Found to have severe neutropenia, hyponatremia, and positive for Enterovirus. Admitted to medicine for further workup. Patient is empirically treated with Vanc and Zosyn. Heme was consulted, patient was s/p BMB. 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 Tonja Perry ([facility]), however, lost to follow up.  - diagnosed with RA in 2022, followed Dr. Lucy Almanzar at that time. Further workup indicated RA limited to feet, so NSAID was recommended with close monitor. However, patient lost to follow up subsequently  - Lyme serology positive in 2022, negative confirmatory immunoblot IgG and IgM. Serologic response to B. burgdorferi infection not detected, recommended for follow up in 7-14 days. However, patient lost to follow up

## 2025-01-11 NOTE — PROGRESS NOTE ADULT - PROBLEM SELECTOR PLAN 3
Reported symptoms for several months. Haven't been evaluated outpatient    Plan  - Fluconazole 150mg x1  - outpatient gyn f/u h/o RA in 2022.  Further workup showed limited to feet, managed with NSAID  This admission  - Appreciate Rheum assessment  - serology positive (CCP, ESR, CRP). However, no clinical evidence nor imaging findings suggest RA    Plan:  - follow up outpatient with Dr. Villalta at [place] if develops RA hallmark symptoms

## 2025-01-11 NOTE — PROGRESS NOTE ADULT - SUBJECTIVE AND OBJECTIVE BOX
Patient is a 79y old  Female who presents with a chief complaint of Sepsis (10 Arvind 2025 13:23)      SUBJECTIVE / OVERNIGHT EVENTS:  Over 24hrs     Pt seen and examined at bedside. Had no complaints. Denied cp, sob, d/n/v    MEDICATIONS  (STANDING):  heparin   Injectable 5000 Unit(s) SubCutaneous every 12 hours  piperacillin/tazobactam IVPB.. 3.375 Gram(s) IV Intermittent every 8 hours    MEDICATIONS  (PRN):  acetaminophen     Tablet .. 650 milliGRAM(s) Oral every 6 hours PRN Temp greater or equal to 38C (100.4F), Mild Pain (1 - 3)  aluminum hydroxide/magnesium hydroxide/simethicone Suspension 30 milliLiter(s) Oral every 4 hours PRN Dyspepsia  melatonin 3 milliGRAM(s) Oral at bedtime PRN Insomnia  ondansetron Injectable 4 milliGRAM(s) IV Push every 8 hours PRN Nausea and/or Vomiting      CAPILLARY BLOOD GLUCOSE      POCT Blood Glucose.: 136 mg/dL (10 Arvind 2025 18:03)    I&O's Summary      Vital Signs Last 24 Hrs  T(C): 36.7 (11 Jan 2025 01:19), Max: 37.1 (10 Arvind 2025 10:00)  T(F): 98.1 (11 Jan 2025 01:19), Max: 98.7 (10 Arvind 2025 10:00)  HR: 73 (11 Jan 2025 01:19) (73 - 85)  BP: 145/69 (11 Jan 2025 01:19) (142/61 - 161/94)  BP(mean): --  RR: 16 (11 Jan 2025 01:19) (16 - 18)  SpO2: 97% (11 Jan 2025 01:19) (97% - 98%)    Parameters below as of 11 Jan 2025 01:19  Patient On (Oxygen Delivery Method): room air        Physical Exam  CONSTITUTIONAL: NAD  EYES: EOMI, conjunctiva and sclera clear  ENMT: Moist oral mucosa  NECK: Supple  RESPIRATORY: Breathing unlabored, CTAB  CARDIOVASCULAR: S1S2 no MRG  ABDOMEN: Nontender to palpation, normoactive bowel sounds, no rebound/guarding  MUSCULOSKELETAL: no clubbing or cyanosis of digits  NEUROLOGY: No focal deficits   SKIN: No rashes or lesions    LABS:                        11.4   2.44  )-----------( 265      ( 10 Arvind 2025 08:36 )             35.8      01-10    133[L]  |  101  |  23  ----------------------------<  109[H]  4.3   |  20[L]  |  1.23    Ca    9.7      10 Arvind 2025 07:45  Phos  3.0     01-10  Mg     2.4     01-10    TPro  7.3  /  Alb  3.3  /  TBili  0.4  /  DBili  x   /  AST  43[H]  /  ALT  41  /  AlkPhos  334[H]  01-10          Urinalysis Basic - ( 10 Arvind 2025 07:45 )    Color: x / Appearance: x / SG: x / pH: x  Gluc: 109 mg/dL / Ketone: x  / Bili: x / Urobili: x   Blood: x / Protein: x / Nitrite: x   Leuk Esterase: x / RBC: x / WBC x   Sq Epi: x / Non Sq Epi: x / Bacteria: x        RADIOLOGY & ADDITIONAL TESTS:    Imaging Personally Reviewed:    Consultant(s) Notes Reviewed:      Care Discussed with Consultants/Other Providers:   Patient is a 79y old  Female who presents with a chief complaint of Sepsis (10 Arvind 2025 13:23)      SUBJECTIVE / OVERNIGHT EVENTS:  Over 24hrs   - 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 Had no complaints. Denied cp, sob, d/n/v    MEDICATIONS  (STANDING):  heparin   Injectable 5000 Unit(s) SubCutaneous every 12 hours  piperacillin/tazobactam IVPB.. 3.375 Gram(s) IV Intermittent every 8 hours    MEDICATIONS  (PRN):  acetaminophen     Tablet .. 650 milliGRAM(s) Oral every 6 hours PRN Temp greater or equal to 38C (100.4F), Mild Pain (1 - 3)  aluminum hydroxide/magnesium hydroxide/simethicone Suspension 30 milliLiter(s) Oral every 4 hours PRN Dyspepsia  melatonin 3 milliGRAM(s) Oral at bedtime PRN Insomnia  ondansetron Injectable 4 milliGRAM(s) IV Push every 8 hours PRN Nausea and/or Vomiting      CAPILLARY BLOOD GLUCOSE      POCT Blood Glucose.: 136 mg/dL (10 Arvind 2025 18:03)    I&O's Summary      Vital Signs Last 24 Hrs  T(C): 36.7 (11 Jan 2025 01:19), Max: 37.1 (10 Arvind 2025 10:00)  T(F): 98.1 (11 Jan 2025 01:19), Max: 98.7 (10 Arvind 2025 10:00)  HR: 73 (11 Jan 2025 01:19) (73 - 85)  BP: 145/69 (11 Jan 2025 01:19) (142/61 - 161/94)  BP(mean): --  RR: 16 (11 Jan 2025 01:19) (16 - 18)  SpO2: 97% (11 Jan 2025 01:19) (97% - 98%)    Parameters below as of 11 Jan 2025 01:19  Patient On (Oxygen Delivery Method): room air        Physical Exam  CONSTITUTIONAL: NAD  EYES: EOMI, conjunctiva and sclera clear  ENMT: Moist oral mucosa  NECK: Supple  RESPIRATORY: Breathing unlabored, CTAB  CARDIOVASCULAR: S1S2 no MRG  ABDOMEN: Nontender to palpation, normoactive bowel sounds, no rebound/guarding  MUSCULOSKELETAL: no clubbing or cyanosis of digits  NEUROLOGY: No focal deficits   SKIN: No rashes or lesions    LABS:                        11.4   2.44  )-----------( 265      ( 10 Arvind 2025 08:36 )             35.8      01-10    133[L]  |  101  |  23  ----------------------------<  109[H]  4.3   |  20[L]  |  1.23    Ca    9.7      10 Arvind 2025 07:45  Phos  3.0     01-10  Mg     2.4     01-10    TPro  7.3  /  Alb  3.3  /  TBili  0.4  /  DBili  x   /  AST  43[H]  /  ALT  41  /  AlkPhos  334[H]  01-10          Urinalysis Basic - ( 10 Arvind 2025 07:45 )    Color: x / Appearance: x / SG: x / pH: x  Gluc: 109 mg/dL / Ketone: x  / Bili: x / Urobili: x   Blood: x / Protein: x / Nitrite: x   Leuk Esterase: x / RBC: x / WBC x   Sq Epi: x / Non Sq Epi: x / Bacteria: x        RADIOLOGY & ADDITIONAL TESTS:    Imaging Personally Reviewed:    Consultant(s) Notes Reviewed:      Care Discussed with Consultants/Other Providers:

## 2025-01-12 LAB
ALBUMIN SERPL ELPH-MCNC: 3.5 G/DL — SIGNIFICANT CHANGE UP (ref 3.3–5)
ALP SERPL-CCNC: 296 U/L — HIGH (ref 40–120)
ALT FLD-CCNC: 32 U/L — SIGNIFICANT CHANGE UP (ref 10–45)
ANION GAP SERPL CALC-SCNC: 14 MMOL/L — SIGNIFICANT CHANGE UP (ref 5–17)
AST SERPL-CCNC: 27 U/L — SIGNIFICANT CHANGE UP (ref 10–40)
BASOPHILS # BLD AUTO: 0.02 K/UL — SIGNIFICANT CHANGE UP (ref 0–0.2)
BASOPHILS NFR BLD AUTO: 1.3 % — SIGNIFICANT CHANGE UP (ref 0–2)
BILIRUB SERPL-MCNC: 0.4 MG/DL — SIGNIFICANT CHANGE UP (ref 0.2–1.2)
BUN SERPL-MCNC: 25 MG/DL — HIGH (ref 7–23)
CALCIUM SERPL-MCNC: 9.8 MG/DL — SIGNIFICANT CHANGE UP (ref 8.4–10.5)
CHLORIDE SERPL-SCNC: 101 MMOL/L — SIGNIFICANT CHANGE UP (ref 96–108)
CO2 SERPL-SCNC: 20 MMOL/L — LOW (ref 22–31)
CREAT SERPL-MCNC: 1.01 MG/DL — SIGNIFICANT CHANGE UP (ref 0.5–1.3)
EGFR: 56 ML/MIN/1.73M2 — LOW
EOSINOPHIL # BLD AUTO: 0.07 K/UL — SIGNIFICANT CHANGE UP (ref 0–0.5)
EOSINOPHIL NFR BLD AUTO: 4.6 % — SIGNIFICANT CHANGE UP (ref 0–6)
GAMMA INTERFERON BACKGROUND BLD IA-ACNC: 0.04 IU/ML — SIGNIFICANT CHANGE UP
GLUCOSE BLDC GLUCOMTR-MCNC: 105 MG/DL — HIGH (ref 70–99)
GLUCOSE SERPL-MCNC: 131 MG/DL — HIGH (ref 70–99)
HCT VFR BLD CALC: 38.3 % — SIGNIFICANT CHANGE UP (ref 34.5–45)
HGB BLD-MCNC: 12 G/DL — SIGNIFICANT CHANGE UP (ref 11.5–15.5)
LYMPHOCYTES # BLD AUTO: 0.93 K/UL — LOW (ref 1–3.3)
LYMPHOCYTES # BLD AUTO: 61.2 % — HIGH (ref 13–44)
M TB IFN-G BLD-IMP: NEGATIVE — SIGNIFICANT CHANGE UP
M TB IFN-G CD4+ BCKGRND COR BLD-ACNC: 0.12 IU/ML — SIGNIFICANT CHANGE UP
M TB IFN-G CD4+CD8+ BCKGRND COR BLD-ACNC: 0.09 IU/ML — SIGNIFICANT CHANGE UP
MAGNESIUM SERPL-MCNC: 2.3 MG/DL — SIGNIFICANT CHANGE UP (ref 1.6–2.6)
MCHC RBC-ENTMCNC: 27 PG — SIGNIFICANT CHANGE UP (ref 27–34)
MCHC RBC-ENTMCNC: 31.3 G/DL — LOW (ref 32–36)
MCV RBC AUTO: 86.1 FL — SIGNIFICANT CHANGE UP (ref 80–100)
MONOCYTES # BLD AUTO: 0.41 K/UL — SIGNIFICANT CHANGE UP (ref 0–0.9)
MONOCYTES NFR BLD AUTO: 27 % — HIGH (ref 2–14)
NEUTROPHILS # BLD AUTO: 0.09 K/UL — LOW (ref 1.8–7.4)
NEUTROPHILS NFR BLD AUTO: 5.9 % — LOW (ref 43–77)
NRBC # BLD: 0 /100 WBCS — SIGNIFICANT CHANGE UP (ref 0–0)
PHOSPHATE SERPL-MCNC: 2.5 MG/DL — SIGNIFICANT CHANGE UP (ref 2.5–4.5)
PLATELET # BLD AUTO: 302 K/UL — SIGNIFICANT CHANGE UP (ref 150–400)
POTASSIUM SERPL-MCNC: 3.8 MMOL/L — SIGNIFICANT CHANGE UP (ref 3.5–5.3)
POTASSIUM SERPL-SCNC: 3.8 MMOL/L — SIGNIFICANT CHANGE UP (ref 3.5–5.3)
PROT SERPL-MCNC: 8.2 G/DL — SIGNIFICANT CHANGE UP (ref 6–8.3)
QUANT TB PLUS MITOGEN MINUS NIL: 1.2 IU/ML — SIGNIFICANT CHANGE UP
RBC # BLD: 4.45 M/UL — SIGNIFICANT CHANGE UP (ref 3.8–5.2)
RBC # FLD: 13.9 % — SIGNIFICANT CHANGE UP (ref 10.3–14.5)
SODIUM SERPL-SCNC: 135 MMOL/L — SIGNIFICANT CHANGE UP (ref 135–145)
WBC # BLD: 1.52 K/UL — LOW (ref 3.8–10.5)
WBC # FLD AUTO: 1.52 K/UL — LOW (ref 3.8–10.5)

## 2025-01-12 PROCEDURE — 99231 SBSQ HOSP IP/OBS SF/LOW 25: CPT | Mod: GC

## 2025-01-12 RX ORDER — CALAMINE
1 LOTION (ML) TOPICAL THREE TIMES A DAY
Refills: 0 | Status: DISCONTINUED | OUTPATIENT
Start: 2025-01-12 | End: 2025-01-14

## 2025-01-12 RX ADMIN — PIPERACILLIN AND TAZOBACTAM 25 GRAM(S): 3; .375 INJECTION, POWDER, LYOPHILIZED, FOR SOLUTION INTRAVENOUS at 14:06

## 2025-01-12 RX ADMIN — HEPARIN SODIUM 5000 UNIT(S): 1000 INJECTION, SOLUTION INTRAVENOUS; SUBCUTANEOUS at 08:18

## 2025-01-12 RX ADMIN — Medication 1 APPLICATION(S): at 14:06

## 2025-01-12 RX ADMIN — Medication 1 APPLICATION(S): at 22:19

## 2025-01-12 RX ADMIN — PIPERACILLIN AND TAZOBACTAM 25 GRAM(S): 3; .375 INJECTION, POWDER, LYOPHILIZED, FOR SOLUTION INTRAVENOUS at 05:45

## 2025-01-12 RX ADMIN — PIPERACILLIN AND TAZOBACTAM 25 GRAM(S): 3; .375 INJECTION, POWDER, LYOPHILIZED, FOR SOLUTION INTRAVENOUS at 22:18

## 2025-01-12 NOTE — PROGRESS NOTE ADULT - ASSESSMENT
79F with no report of PMHx and not on home medications, presented with fever and weakness. Found to have severe neutropenia, hyponatremia, and positive for Enterovirus. Admitted to medicine for further workup. Patient is empirically treated with Vanc and Zosyn. Heme was consulted, patient was s/p BMB. 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 Tonja Perry ([facility]), however, lost to follow up.  - diagnosed with RA in 2022, followed Dr. Lucy Almanzar at that time. Further workup indicated RA limited to feet, so NSAID was recommended with close monitor. However, patient lost to follow up subsequently  - Lyme serology positive in 2022, negative confirmatory immunoblot IgG and IgM. Serologic response to B. burgdorferi infection not detected, recommended for follow up in 7-14 days. However, patient lost to follow up

## 2025-01-12 NOTE — DISCHARGE NOTE PROVIDER - NSDCFUADDAPPT_GEN_ALL_CORE_FT
APPTS ARE READY TO BE MADE: [X] YES    Best Family or Patient Contact (if needed): Self, son    Additional Information about above appointments (if needed):    1: PCP  2: Hematology  3: Rheumatology    Other comments or requests:    APPTS ARE READY TO BE MADE: [X] YES    Best Family or Patient Contact (if needed): Self, son    Additional Information about above appointments (if needed):    1: PCP  2: Hematology Dr Morales   3: Rheumatology    Other comments or requests:

## 2025-01-12 NOTE — DISCHARGE NOTE PROVIDER - NSFOLLOWUPCLINICS_GEN_ALL_ED_FT
[facility]  Hematology/Oncology  [address] [id number]  Phone: [phone]  Fax:

## 2025-01-12 NOTE — DISCHARGE NOTE PROVIDER - CARE PROVIDER_API CALL
Kelsey Villalta  Rheumatology  [address] [place] [id number]  Phone: [phone]  Fax: [phone]  Follow Up Time: 1 month

## 2025-01-12 NOTE — DISCHARGE NOTE PROVIDER - NSDCCPCAREPLAN_GEN_ALL_CORE_FT
PRINCIPAL DISCHARGE DIAGNOSIS  Diagnosis: Neutropenic fever  Assessment and Plan of Treatment: You were admitted to the hospital for neutropenic fever, a fever that occurs when your white blood cell count is low, making you more susceptible to infections.  You were treated empirically with antibiotics for 7 days.  During your stay, you also underwent a bone marrow biopsy, and the results showed_______? T-cell large granular lymphocytic leukemia (T-LGL). You received treatment with _____________   You are now being discharged and will need to follow up with your hematologist/oncologist to discuss the next steps in your treatment plan for T-LGL.  It is important to continue monitoring for any signs of infection, such as fever, chills, or any new or worsening symptoms.  Please contact your doctor immediately if you experience any of these symptoms.        SECONDARY DISCHARGE DIAGNOSES  Diagnosis: RA (rheumatoid arthritis)  Assessment and Plan of Treatment: You were admitted to the hospital for neutropenic fever.  Given your history of rheumatoid arthritis (RA), you were evaluated by the rheumatology team. While your serology was positive for RA, you remain clinically asymptomatic, and imaging studies were unremarkable. You are now being discharged.  You should continue to monitor for any signs or symptoms of infection, such as fever, chills, or new or worsening symptoms.  Additionally, although you do not currently have symptoms of rheumatoid arthritis, please follow up with a rheumatologist if you develop any joint pain, swelling, stiffness, or other symptoms suggestive of RA.  You should also continue to follow up with your oncologist/hematologist as directed.      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 You were treated with fluid restriction and hypertonic saline (2% HTS) one time, and your sodium levels have stabilized. Upon discharge, the plan is _________and you would need to monitor your sodium levels with regular blood tests and follow up with your oncologist/hematologist. / continue fluid restriction and gradually transition to oral sodium supplementation. / follow up with your PCP and Nephrology to further evaluate and management. IF you experience fever, chills, or any new or worsening symptoms, and to contact your doctor immediately if these occur. You should also watch for signs of low sodium, such as nausea, headache, confusion, or muscle weakness.       PRINCIPAL DISCHARGE DIAGNOSIS  Diagnosis: Neutropenic fever  Assessment and Plan of Treatment: You were admitted to the hospital for neutropenic fever, a fever that occurs when your white blood cell count is low, making you more susceptible to infections.  You were treated empirically with antibiotics for 7 days.  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   You are now being discharged and will need to follow up with your hematologist/oncologist to discuss the next steps in your treatment plan for T-LGLL.  It is important to continue monitoring for any signs of infection, such as fever, chills, or any new or worsening symptoms.  Please contact your doctor immediately if you experience any of these symptoms.         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 You were treated with fluid restriction and hypertonic saline (2% HTS) one time, and your sodium levels have stabilized. Upon discharge, please continue good oral protein intake and and limit free water intake to less than 1.5 Liter a day. You would need to monitor your sodium levels with regular blood tests and follow up with your oncologist/hematologist.    Diagnosis: RA (rheumatoid arthritis)  Assessment and Plan of Treatment: You were admitted to the hospital for neutropenic fever.  Given your history of rheumatoid arthritis (RA), you were evaluated by the rheumatology team. While your serology was positive for RA, you remain clinically asymptomatic, and imaging studies were unremarkable. You are now being discharged.  You should continue to monitor for any signs or symptoms of infection, such as fever, chills, or new or worsening symptoms.  Additionally, although you do not currently have symptoms of rheumatoid arthritis, please follow up with a rheumatologist if you develop any joint pain, swelling, stiffness, or other symptoms suggestive of RA.  You should also continue to follow up with your oncologist/hematologist as directed.

## 2025-01-12 NOTE — PROGRESS NOTE ADULT - PROBLEM SELECTOR PLAN 1
presented with fever and severe neutropenia -  on admission --> 0 (1/6)    Workup:   (+) Enterovirus. (+)EBV IgG/EBNA/VCA/EA. (+) CMV IgG. Low uric acid (2.1), elevated LDH (292), Hapto wnl  - elevated CCP, ESR, CRP. XRs negative for RA   - US with no splenomegaly.   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

## 2025-01-12 NOTE — DISCHARGE NOTE PROVIDER - DETAILS OF MALNUTRITION DIAGNOSIS/DIAGNOSES
This patient has been assessed with a concern for Malnutrition and was treated during this hospitalization for the following Nutrition diagnosis/diagnoses:     -  01/08/2025: Underweight (BMI < 19)

## 2025-01-12 NOTE — PROGRESS NOTE ADULT - PROBLEM SELECTOR PLAN 3
h/o RA in 2022.  Further workup showed limited to feet, managed with NSAID  This admission  - Appreciate Rheum assessment  - serology positive (CCP, ESR, CRP). However, no clinical evidence nor imaging findings suggest RA    Plan:  - follow up outpatient with Dr. Villalta at [facility] if develops RA hallmark symptoms

## 2025-01-12 NOTE — DISCHARGE NOTE PROVIDER - NSDCCPTREATMENT_GEN_ALL_CORE_FT
PRINCIPAL PROCEDURE  Procedure: CXR, single AP view  Findings and Treatment: FINDINGS:  Theheart size is normal.  The lungs are clear.  No pleural effusion.  No pneumothorax.  No acute osseous abnormalities.       PRINCIPAL PROCEDURE  Procedure: CXR, single AP view  Findings and Treatment: FINDINGS:  Theheart size is normal.  The lungs are clear.  No pleural effusion.  No pneumothorax.  No acute osseous abnormalities.        SECONDARY PROCEDURE  Procedure: Xray of whole skeleton  Findings and Treatment: IMPRESSION:  No acute fractures or dislocations.  Bilaterally congruent ankle mortises with smooth intact talar domes.   Tarsometatarsal alignment maintained without evidence for a Lisfranc   injury.  Slight bilateral hallux valgus deformities with small bunions. Bilateral   hammertoe deformities. 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   Preserved remaining joint spaces and no developed joint margin erosions.  Slight posterosuperior calcaneal Ludivina deformities. Small plantar   calcaneal enthesophytes.  Generalized osteopenia otherwise no discrete lytic or blastic lesions.

## 2025-01-12 NOTE — DISCHARGE NOTE PROVIDER - HOSPITAL COURSE
For full details, please see H&P, progress notes, consult notes and ancillary notes.  71 F Hx HLD (diet controlled) p/w acute L hip pain.  Patient was in her usual state of health, no limitation in mobility, physically active, jogs regularly, visited daughter in [place] for a moth, returned home to [place] July 1, went to a park to play basketball with grandkids on July 3 for 30 min.  Patient woke up the morning of July 4 with severe 8/10 L hip pain, radiation to medial/inner thigh, worsen with movement, improved w/o movement, resulting in inability to ambulate, came to ED for further evaluation.  No trauma, no fever/chill, no manipulation or injection of L hip.    Patient admitted to Internal Medicine for further management.    Hospital Course:  Found to have severe neutropenia, hyponatremia, and positive for Enterovirus. Patient is empirically treated with short course of Vanc and 7 days course of Zosyn. Patient was also evaluated by Rheumatology for previous history of RA (in 2022, limited in feet, not on medications). Patient showed positive serology, however unremarkable symptoms clinically and XR findings . Heme was consulted neutropenia, patient was s/p BMB. 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 Pathology prelim result confirmed and patient received treatment with ______________. Upon discharge, patient will continue ________ and have closed follow up with her PCP, Oncology, and Rheumatology outpatient for further management    On day of discharge, patient is clinically stable with no new exam findings or acute symptoms compared to prior. The patient was seen by the attending physician on the date of discharge and deemed stable and acceptable for discharge. The patient's chronic medical conditions were treated accordingly per the patient's home medication regimen. The patient's medication reconciliation (with changes made to chronic medications), follow up appointments, discharge orders, instructions, and significant lab and diagnostic studies are as noted.     Discharge follow up action items:     1. Follow up with PCP in 1-2 weeks. Follow up with Hematology, Rheumatology subspecialists in 1-2 weeks.  2. Follow up labs: ***  3. Medication changes: ***  4. On hold medications: ***  5. Incidental findings: ***  6. Diagnoses : neutropenic fever    Patient's ordered code status: [X] Full code  [ ] DNR  [ ] Hospice  Patient disposition: Home PT    Patient will be discharged to home with close follow up.   For full details, please see H&P, progress notes, consult notes and ancillary notes.  71 F Hx HLD (diet controlled) p/w acute L hip pain.  Patient was in her usual state of health, no limitation in mobility, physically active, jogs regularly, visited daughter in [place] for a moth, returned home to [place] July 1, went to a park to play basketball with grandkids on July 3 for 30 min.  Patient woke up the morning of July 4 with severe 8/10 L hip pain, radiation to medial/inner thigh, worsen with movement, improved w/o movement, resulting in inability to ambulate, came to ED for further evaluation.  No trauma, no fever/chill, no manipulation or injection of L hip.    Patient admitted to Internal Medicine for further management.    Hospital Course:  Found to have severe neutropenia, hyponatremia, and positive for Enterovirus. Patient is empirically treated with short course of Vanc and 7 days course of Zosyn. Patient was also evaluated by Rheumatology for previous history of RA (in 2022, limited in feet, not on medications). Patient showed positive serology, however unremarkable symptoms clinically and XR findings . Heme was consulted neutropenia, patient was s/p BMB. 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 Pathology prelim result suggests T-Large Granular Lymphocytic Leukemia (T-LGLL). Pt would need to follow up with Dr Morales (hematology) at [facility] out patient for further work up before starting treatment.     On day of discharge, patient is clinically stable with no new exam findings or acute symptoms compared to prior. The patient was seen by the attending physician on the date of discharge and deemed stable and acceptable for discharge. The patient's chronic medical conditions were treated accordingly per the patient's home medication regimen. The patient's medication reconciliation (with changes made to chronic medications), follow up appointments, discharge orders, instructions, and significant lab and diagnostic studies are as noted.     Discharge follow up action items:     1. Follow up with PCP in 1-2 weeks. Follow up with Hematology, Rheumatology subspecialists in 1-2 weeks.  2. Follow up labs: Cytogenetic test, OnkoSight Myeloid Disorder Panel, Granlocyte Antibodies   3. Medication changes: started amlodipine 5mg for hypertension.   4. Incidental findings: Rheumatoid factor 49. Positive CCP Ab  5. Diagnoses : neutropenic fever 2/2 T-LGLL    Patient's ordered code status: [X] Full code  [ ] DNR  [ ] Hospice  Patient disposition: Home PT

## 2025-01-12 NOTE — DISCHARGE NOTE PROVIDER - NSDCMRMEDTOKEN_GEN_ALL_CORE_FT
amLODIPine 5 mg oral tablet: 1 tab(s) orally once a day  calamine topical lotion: 1 Apply topically to affected area 3 times a day  calamine topical lotion: Apply topically to affected area 3 times a day 1 Apply topically to affected area 3 times a day

## 2025-01-12 NOTE — PROGRESS NOTE ADULT - TIME BILLING
Time spent on review of vitals, physical exam, documentation, and discussion of plan of care with patient, patient family, resident, consultants and multidisciplinary team.
Time spent on review of vitals, physical exam, documentation, and discussion of plan of care with patient, patient family, resident, consultants and multidisciplinary team.
Time spent on review of vitals, physical exam, documentation, and discussion of plan of care with patient, resident, consultants and multidisciplinary team.
Time spent on review of vitals, physical exam, documentation, and discussion of plan of care with patient, patient family, resident, consultants and multidisciplinary team.
Time spent on review of vitals, physical exam, documentation, and discussion of plan of care with patient, patient family, resident, consultants and multidisciplinary team.
Time spent on review of vitals, physical exam, documentation, and discussion of plan of care with patient, resident, and multidisciplinary team.
Time spent on review of vitals, physical exam, documentation, and discussion of plan of care with patient, patient family (son), resident, and multidisciplinary team.

## 2025-01-12 NOTE — PROGRESS NOTE ADULT - SUBJECTIVE AND OBJECTIVE BOX
PROGRESS NOTE:     Patient is a 79y old  Female who presents with a chief complaint of Sepsis (12 Jan 2025 00:17)      SUBJECTIVE / OVERNIGHT EVENTS:    No acute events overnight. Patient examined at bedside with no acute complaints.     Pain:  Bowel Movements:  Urination:  OOB:  PT:    REVIEW OF SYSTEMS:    CONSTITUTIONAL: No weakness, fevers or chills  EYES/ENT: No visual changes;  No vertigo or throat pain   NECK: No pain or stiffness  RESPIRATORY: No cough, wheezing, hemoptysis; No shortness of breath  CARDIOVASCULAR: No chest pain or palpitations  GASTROINTESTINAL: No abdominal or epigastric pain. No nausea, vomiting, or hematemesis; No diarrhea or constipation. No melena or hematochezia.  GENITOURINARY: No dysuria, frequency or hematuria  NEUROLOGICAL: No numbness or weakness  SKIN: No itching, rashes      MEDICATIONS  (STANDING):  heparin   Injectable 5000 Unit(s) SubCutaneous every 12 hours  piperacillin/tazobactam IVPB.. 3.375 Gram(s) IV Intermittent every 8 hours    MEDICATIONS  (PRN):  acetaminophen     Tablet .. 650 milliGRAM(s) Oral every 6 hours PRN Temp greater or equal to 38C (100.4F), Mild Pain (1 - 3)  aluminum hydroxide/magnesium hydroxide/simethicone Suspension 30 milliLiter(s) Oral every 4 hours PRN Dyspepsia  melatonin 3 milliGRAM(s) Oral at bedtime PRN Insomnia  ondansetron Injectable 4 milliGRAM(s) IV Push every 8 hours PRN Nausea and/or Vomiting      CAPILLARY BLOOD GLUCOSE        I&O's Summary      VITALS:   T(C): 36.8 (01-12-25 @ 06:02), Max: 37.2 (01-11-25 @ 14:27)  HR: 61 (01-12-25 @ 06:02) (61 - 73)  BP: 144/79 (01-12-25 @ 06:02) (144/79 - 163/78)  RR: 16 (01-12-25 @ 06:02) (16 - 18)  SpO2: 95% (01-12-25 @ 06:02) (95% - 99%)    GENERAL: NAD, lying in bed comfortably  HEAD:  Atraumatic, normocephalic  EYES: EOMI, PERRLA, conjunctiva and sclera clear  ENT: Moist mucous membranes  NECK: Supple, no JVD  HEART: Regular rate and rhythm, no murmurs, rubs, or gallops  LUNGS: Unlabored respirations.  Clear to auscultation bilaterally, no crackles, wheezing, or rhonchi  ABDOMEN: Soft, nontender, nondistended, +BS  EXTREMITIES: 2+ peripheral pulses bilaterally. No clubbing, cyanosis, or edema  NERVOUS SYSTEM:  A&Ox3, no focal deficits   SKIN: No rashes or lesions    LABS:                        11.2   2.06  )-----------( 268      ( 11 Jan 2025 07:26 )             35.2     01-11    133[L]  |  100  |  22  ----------------------------<  110[H]  4.0   |  20[L]  |  0.89    Ca    9.9      11 Jan 2025 07:26  Phos  3.0     01-11  Mg     2.4     01-11    TPro  7.6  /  Alb  3.2[L]  /  TBili  0.3  /  DBili  x   /  AST  30  /  ALT  35  /  AlkPhos  306[H]  01-11          Urinalysis Basic - ( 11 Jan 2025 07:26 )    Color: x / Appearance: x / SG: x / pH: x  Gluc: 110 mg/dL / Ketone: x  / Bili: x / Urobili: x   Blood: x / Protein: x / Nitrite: x   Leuk Esterase: x / RBC: x / WBC x   Sq Epi: x / Non Sq Epi: x / Bacteria: x          RADIOLOGY & ADDITIONAL TESTS:  Results Reviewed:   Imaging Personally Reviewed:  Electrocardiogram Personally Reviewed:    COORDINATION OF CARE:  Care Discussed with Consultants/Other Providers [Y/N]:  Prior or Outpatient Records Reviewed [Y/N]:   PROGRESS NOTE:     Patient is a 79y old  Female who presents with a chief complaint of Sepsis (12 Jan 2025 00:17)      SUBJECTIVE / OVERNIGHT EVENTS:    No acute events overnight. Patient examined at bedside with no acute complaints. Reports rash near BM bx site.       REVIEW OF SYSTEMS:    CONSTITUTIONAL: No weakness, fevers or chills  EYES/ENT: No visual changes;  No vertigo or throat pain   NECK: No pain or stiffness  RESPIRATORY: No cough, wheezing, hemoptysis; No shortness of breath  CARDIOVASCULAR: No chest pain or palpitations  GASTROINTESTINAL: No abdominal or epigastric pain. No nausea, vomiting, or hematemesis; No diarrhea or constipation. No melena or hematochezia.  GENITOURINARY: No dysuria, frequency or hematuria  NEUROLOGICAL: No numbness or weakness  SKIN: + itching, rashes near BM bx site       MEDICATIONS  (STANDING):  heparin   Injectable 5000 Unit(s) SubCutaneous every 12 hours  piperacillin/tazobactam IVPB.. 3.375 Gram(s) IV Intermittent every 8 hours    MEDICATIONS  (PRN):  acetaminophen     Tablet .. 650 milliGRAM(s) Oral every 6 hours PRN Temp greater or equal to 38C (100.4F), Mild Pain (1 - 3)  aluminum hydroxide/magnesium hydroxide/simethicone Suspension 30 milliLiter(s) Oral every 4 hours PRN Dyspepsia  melatonin 3 milliGRAM(s) Oral at bedtime PRN Insomnia  ondansetron Injectable 4 milliGRAM(s) IV Push every 8 hours PRN Nausea and/or Vomiting      CAPILLARY BLOOD GLUCOSE        I&O's Summary      VITALS:   T(C): 36.8 (01-12-25 @ 06:02), Max: 37.2 (01-11-25 @ 14:27)  HR: 61 (01-12-25 @ 06:02) (61 - 73)  BP: 144/79 (01-12-25 @ 06:02) (144/79 - 163/78)  RR: 16 (01-12-25 @ 06:02) (16 - 18)  SpO2: 95% (01-12-25 @ 06:02) (95% - 99%)    GENERAL: NAD, lying in bed comfortably  HEAD:  Atraumatic, normocephalic  EYES: EOMI, PERRLA, conjunctiva and sclera clear  ENT: Moist mucous membranes  NECK: Supple, no JVD  HEART: Regular rate and rhythm, no murmurs, rubs, or gallops  LUNGS: Unlabored respirations.  Clear to auscultation bilaterally, no crackles, wheezing, or rhonchi  ABDOMEN: Soft, nontender, nondistended, +BS  EXTREMITIES: 2+ peripheral pulses bilaterally. No clubbing, cyanosis, or edema  NERVOUS SYSTEM:  A&Ox3, no focal deficits   SKIN: No rashes or lesions    LABS:                        11.2   2.06  )-----------( 268      ( 11 Jan 2025 07:26 )             35.2     01-11    133[L]  |  100  |  22  ----------------------------<  110[H]  4.0   |  20[L]  |  0.89    Ca    9.9      11 Jan 2025 07:26  Phos  3.0     01-11  Mg     2.4     01-11    TPro  7.6  /  Alb  3.2[L]  /  TBili  0.3  /  DBili  x   /  AST  30  /  ALT  35  /  AlkPhos  306[H]  01-11          Urinalysis Basic - ( 11 Jan 2025 07:26 )    Color: x / Appearance: x / SG: x / pH: x  Gluc: 110 mg/dL / Ketone: x  / Bili: x / Urobili: x   Blood: x / Protein: x / Nitrite: x   Leuk Esterase: x / RBC: x / WBC x   Sq Epi: x / Non Sq Epi: x / Bacteria: x          RADIOLOGY & ADDITIONAL TESTS:  Results Reviewed:   Imaging Personally Reviewed:  Electrocardiogram Personally Reviewed:    COORDINATION OF CARE:  Care Discussed with Consultants/Other Providers [Y/N]:  Prior or Outpatient Records Reviewed [Y/N]:

## 2025-01-13 LAB
ALBUMIN SERPL ELPH-MCNC: 3.4 G/DL — SIGNIFICANT CHANGE UP (ref 3.3–5)
ALP SERPL-CCNC: 265 U/L — HIGH (ref 40–120)
ALT FLD-CCNC: 28 U/L — SIGNIFICANT CHANGE UP (ref 10–45)
ANION GAP SERPL CALC-SCNC: 12 MMOL/L — SIGNIFICANT CHANGE UP (ref 5–17)
AST SERPL-CCNC: 26 U/L — SIGNIFICANT CHANGE UP (ref 10–40)
BASOPHILS # BLD AUTO: 0.01 K/UL — SIGNIFICANT CHANGE UP (ref 0–0.2)
BASOPHILS NFR BLD AUTO: 0.8 % — SIGNIFICANT CHANGE UP (ref 0–2)
BILIRUB SERPL-MCNC: 0.3 MG/DL — SIGNIFICANT CHANGE UP (ref 0.2–1.2)
BUN SERPL-MCNC: 26 MG/DL — HIGH (ref 7–23)
CALCIUM SERPL-MCNC: 9.8 MG/DL — SIGNIFICANT CHANGE UP (ref 8.4–10.5)
CHLORIDE SERPL-SCNC: 102 MMOL/L — SIGNIFICANT CHANGE UP (ref 96–108)
CO2 SERPL-SCNC: 22 MMOL/L — SIGNIFICANT CHANGE UP (ref 22–31)
CREAT SERPL-MCNC: 1.11 MG/DL — SIGNIFICANT CHANGE UP (ref 0.5–1.3)
EGFR: 50 ML/MIN/1.73M2 — LOW
EOSINOPHIL # BLD AUTO: 0.21 K/UL — SIGNIFICANT CHANGE UP (ref 0–0.5)
EOSINOPHIL NFR BLD AUTO: 12 % — HIGH (ref 0–6)
GIANT PLATELETS BLD QL SMEAR: PRESENT — SIGNIFICANT CHANGE UP
GLUCOSE SERPL-MCNC: 109 MG/DL — HIGH (ref 70–99)
HCT VFR BLD CALC: 35.4 % — SIGNIFICANT CHANGE UP (ref 34.5–45)
HGB BLD-MCNC: 11.2 G/DL — LOW (ref 11.5–15.5)
LYMPHOCYTES # BLD AUTO: 1.06 K/UL — SIGNIFICANT CHANGE UP (ref 1–3.3)
LYMPHOCYTES # BLD AUTO: 61.5 % — HIGH (ref 13–44)
MAGNESIUM SERPL-MCNC: 2.5 MG/DL — SIGNIFICANT CHANGE UP (ref 1.6–2.6)
MANUAL SMEAR VERIFICATION: SIGNIFICANT CHANGE UP
MCHC RBC-ENTMCNC: 27.2 PG — SIGNIFICANT CHANGE UP (ref 27–34)
MCHC RBC-ENTMCNC: 31.6 G/DL — LOW (ref 32–36)
MCV RBC AUTO: 85.9 FL — SIGNIFICANT CHANGE UP (ref 80–100)
MONOCYTES # BLD AUTO: 0.35 K/UL — SIGNIFICANT CHANGE UP (ref 0–0.9)
MONOCYTES NFR BLD AUTO: 20.5 % — HIGH (ref 2–14)
NEUTROPHILS # BLD AUTO: 0.09 K/UL — LOW (ref 1.8–7.4)
NEUTROPHILS NFR BLD AUTO: 4.3 % — LOW (ref 43–77)
NEUTS BAND # BLD: 0.9 % — SIGNIFICANT CHANGE UP (ref 0–8)
PHOSPHATE SERPL-MCNC: 3.3 MG/DL — SIGNIFICANT CHANGE UP (ref 2.5–4.5)
PLAT MORPH BLD: ABNORMAL
PLATELET # BLD AUTO: 310 K/UL — SIGNIFICANT CHANGE UP (ref 150–400)
POTASSIUM SERPL-MCNC: 4 MMOL/L — SIGNIFICANT CHANGE UP (ref 3.5–5.3)
POTASSIUM SERPL-SCNC: 4 MMOL/L — SIGNIFICANT CHANGE UP (ref 3.5–5.3)
PROT SERPL-MCNC: 7.8 G/DL — SIGNIFICANT CHANGE UP (ref 6–8.3)
RBC # BLD: 4.12 M/UL — SIGNIFICANT CHANGE UP (ref 3.8–5.2)
RBC # FLD: 14 % — SIGNIFICANT CHANGE UP (ref 10.3–14.5)
RBC BLD AUTO: SIGNIFICANT CHANGE UP
SODIUM SERPL-SCNC: 136 MMOL/L — SIGNIFICANT CHANGE UP (ref 135–145)
WBC # BLD: 1.73 K/UL — LOW (ref 3.8–10.5)
WBC # FLD AUTO: 1.73 K/UL — LOW (ref 3.8–10.5)

## 2025-01-13 PROCEDURE — 99233 SBSQ HOSP IP/OBS HIGH 50: CPT | Mod: GC

## 2025-01-13 PROCEDURE — 99232 SBSQ HOSP IP/OBS MODERATE 35: CPT | Mod: GC

## 2025-01-13 RX ORDER — LOSARTAN POTASSIUM 100 MG/1
50 TABLET, FILM COATED ORAL DAILY
Refills: 0 | Status: DISCONTINUED | OUTPATIENT
Start: 2025-01-13 | End: 2025-01-13

## 2025-01-13 RX ADMIN — PIPERACILLIN AND TAZOBACTAM 25 GRAM(S): 3; .375 INJECTION, POWDER, LYOPHILIZED, FOR SOLUTION INTRAVENOUS at 14:43

## 2025-01-13 RX ADMIN — PIPERACILLIN AND TAZOBACTAM 25 GRAM(S): 3; .375 INJECTION, POWDER, LYOPHILIZED, FOR SOLUTION INTRAVENOUS at 05:21

## 2025-01-13 RX ADMIN — LOSARTAN POTASSIUM 50 MILLIGRAM(S): 100 TABLET, FILM COATED ORAL at 17:53

## 2025-01-13 RX ADMIN — HEPARIN SODIUM 5000 UNIT(S): 1000 INJECTION, SOLUTION INTRAVENOUS; SUBCUTANEOUS at 05:21

## 2025-01-13 RX ADMIN — HEPARIN SODIUM 5000 UNIT(S): 1000 INJECTION, SOLUTION INTRAVENOUS; SUBCUTANEOUS at 17:53

## 2025-01-13 RX ADMIN — PIPERACILLIN AND TAZOBACTAM 25 GRAM(S): 3; .375 INJECTION, POWDER, LYOPHILIZED, FOR SOLUTION INTRAVENOUS at 21:17

## 2025-01-13 NOTE — PROGRESS NOTE ADULT - SUBJECTIVE AND OBJECTIVE BOX
PROGRESS NOTE:     Patient is a 79y old  Female who presents with a chief complaint of Sepsis (12 Jan 2025 07:35)      INTERVAL EVENTS: No acute overnight events.     SUBJECTIVE: Patient seen and examined at bedside. This morning, the patient is comfortable and doing well. No acute complaints.    MEDICATIONS  (STANDING):  calamine/zinc oxide Lotion 1 Application(s) Topical three times a day  heparin   Injectable 5000 Unit(s) SubCutaneous every 12 hours  piperacillin/tazobactam IVPB.. 3.375 Gram(s) IV Intermittent every 8 hours    MEDICATIONS  (PRN):  acetaminophen     Tablet .. 650 milliGRAM(s) Oral every 6 hours PRN Temp greater or equal to 38C (100.4F), Mild Pain (1 - 3)  aluminum hydroxide/magnesium hydroxide/simethicone Suspension 30 milliLiter(s) Oral every 4 hours PRN Dyspepsia  melatonin 3 milliGRAM(s) Oral at bedtime PRN Insomnia  ondansetron Injectable 4 milliGRAM(s) IV Push every 8 hours PRN Nausea and/or Vomiting      CAPILLARY BLOOD GLUCOSE      POCT Blood Glucose.: 105 mg/dL (12 Jan 2025 12:45)    I&O's Summary    12 Jan 2025 07:01  -  13 Jan 2025 06:54  --------------------------------------------------------  IN: 720 mL / OUT: 0 mL / NET: 720 mL        PHYSICAL EXAM:  Vital Signs Last 24 Hrs  T(C): 36.7 (12 Jan 2025 22:13), Max: 36.9 (12 Jan 2025 10:00)  T(F): 98 (12 Jan 2025 22:13), Max: 98.4 (12 Jan 2025 10:00)  HR: 81 (12 Jan 2025 22:13) (70 - 84)  BP: 168/76 (12 Jan 2025 22:13) (156/76 - 168/76)  BP(mean): --  RR: 18 (12 Jan 2025 22:13) (16 - 18)  SpO2: 98% (12 Jan 2025 22:13) (97% - 98%)    Parameters below as of 12 Jan 2025 22:13  Patient On (Oxygen Delivery Method): room air        GENERAL: NAD, lying in bed comfortably  HEAD: Atraumatic, normocephalic  EYES: EOMI, PERRLA, conjunctiva and sclera clear  ENT: Moist mucous membranes  NECK: Supple, no JVD  HEART: S1, S2, Regular rate and rhythm, no murmurs, rubs, or gallops  LUNGS: Unlabored respirations, clear to auscultation bilaterally, no crackles, wheezing, or rhonchi  ABDOMEN: Soft, nontender, nondistended, +BS  EXTREMITIES: 2+ peripheral pulses bilaterally. No clubbing, cyanosis, or edema  NERVOUS SYSTEM:  A&Ox3, no focal deficits   SKIN: No rashes or lesions    LABS:                        12.0   1.52  )-----------( 302      ( 12 Jan 2025 11:22 )             38.3     01-12    135  |  101  |  25[H]  ----------------------------<  131[H]  3.8   |  20[L]  |  1.01    Ca    9.8      12 Jan 2025 11:29  Phos  2.5     01-12  Mg     2.3     01-12    TPro  8.2  /  Alb  3.5  /  TBili  0.4  /  DBili  x   /  AST  27  /  ALT  32  /  AlkPhos  296[H]  01-12          Urinalysis Basic - ( 12 Jan 2025 11:29 )    Color: x / Appearance: x / SG: x / pH: x  Gluc: 131 mg/dL / Ketone: x  / Bili: x / Urobili: x   Blood: x / Protein: x / Nitrite: x   Leuk Esterase: x / RBC: x / WBC x   Sq Epi: x / Non Sq Epi: x / Bacteria: x          RADIOLOGY & ADDITIONAL TESTS:  Results Reviewed:   Imaging Personally Reviewed:  Electrocardiogram Personally Reviewed:  Tele:

## 2025-01-13 NOTE — PROGRESS NOTE ADULT - ASSESSMENT
79F with PMH hypothyroidism, RA, and HTN with previous episode of neutropenia in 2022 presenting with fever and URI symptoms found to have enterorhinovirus. Heme consulted for severe neutropenia ( on admission now 0). Patient had episodes of severe neutropenia in the past (ANC 30) with normal flow cytometry but no bone marrow biopsy performed. Patient does not take any medications to explain neutropenia.    #Severe neutropenia/ T-LGL Leukemia  - ANC has remained severely low (~10-90)  - Flow cytometry: No diagnostic immunophenotypic evidence of acute leukemia, lymphoma/lymphoproliferative disorder or myeloid neoplasm but with inadequate neutrophils  - BMB: Hypercellular marrow with markedly left-shifted granulopoiesis and atypical T-large granular lymphocytosis, Flow cytometric analysis detected a T-large granular lymphocyte population with aberrant loss of CD5 expression. 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 The differential diagnosis includes, but not limited to, T-cell clones of undetermined significance (TCUS).  - TCR: Results pending      Recommendations:   - No emergent need for inpatient chemotherapy  - TCR to confirm T-LGL leukemia diagnosis pending, patient will f/u outpatient  - Patient to follow up with Dr. White outpatient 79F with PMH hypothyroidism, RA, and HTN with previous episode of neutropenia in 2022 presenting with fever and URI symptoms found to have enterorhinovirus. Heme consulted for severe neutropenia ( on admission now 0). Patient had episodes of severe neutropenia in the past (ANC 30) with normal flow cytometry but no bone marrow biopsy performed. Patient does not take any medications to explain neutropenia.    #Severe neutropenia/ T-LGL Leukemia  - ANC has remained severely low (~10-90)  - Flow cytometry: No diagnostic immunophenotypic evidence of acute leukemia, lymphoma/lymphoproliferative disorder or myeloid neoplasm but with inadequate neutrophils  - BMB: Hypercellular marrow with markedly left-shifted granulopoiesis and atypical T-large granular lymphocytosis, Flow cytometric analysis detected a T-large granular lymphocyte population with aberrant loss of CD5 expression. 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 The differential diagnosis includes, but not limited to, T-cell clones of undetermined significance (TCUS).  - TCR: Results pending      Recommendations:   - No emergent need for inpatient chemotherapy  - TCR to confirm T-LGL leukemia diagnosis pending, patient will f/u outpatient  - Patient to follow up with Dr. White outpatient      ***incomplete until signed by attending*** 79F with PMH hypothyroidism, RA, and HTN with previous episode of neutropenia in 2022 presenting with fever and URI symptoms found to have enterorhinovirus. Heme consulted for severe neutropenia ( on admission now 0). Patient had episodes of severe neutropenia in the past (ANC 30) with normal flow cytometry but no bone marrow biopsy performed. Patient does not take any medications to explain neutropenia.    #Severe neutropenia/ T-LGL Leukemia  - ANC has remained severely low (~10-90)  - Flow cytometry: No diagnostic immunophenotypic evidence of acute leukemia, lymphoma/lymphoproliferative disorder or myeloid neoplasm but with inadequate neutrophils  - BMB: Hypercellular marrow with markedly left-shifted granulopoiesis and atypical T-large granular lymphocytosis, Flow cytometric analysis detected a T-large granular lymphocyte population with aberrant loss of CD5 expression. 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 The differential diagnosis includes, but not limited to, T-cell clones of undetermined significance (TCUS).  - TCR: Results pending      Recommendations:   - No emergent need for inpatient chemotherapy  - TCR to confirm T-LGL leukemia diagnosis pending, patient will f/u outpatient  - Patient to follow up with Dr. White outpatient  - Patient stable for discharge from a hematologic perspective  - Discussed plan with patient and her son at bedside      ***incomplete until signed by attending***

## 2025-01-13 NOTE — PROGRESS NOTE ADULT - PROBLEM SELECTOR PLAN 3
h/o RA in 2022.  Further workup showed limited to feet, managed with NSAID  This admission  - Appreciate Rheum assessment  - serology positive (CCP, ESR, CRP). However, no clinical evidence nor imaging findings suggest RA    Plan:  - follow up outpatient with Dr. Villalta at [facility] if develops RA hallmark symptoms

## 2025-01-13 NOTE — PROGRESS NOTE ADULT - PROBLEM SELECTOR PLAN 2
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

## 2025-01-13 NOTE — PROGRESS NOTE ADULT - PROBLEM SELECTOR PLAN 1
presented with fever and severe neutropenia -  on admission --> 0 (1/6)    Workup:   (+) Enterovirus. (+)EBV IgG/EBNA/VCA/EA. (+) CMV IgG. Low uric acid (2.1), elevated LDH (292), Hapto wnl  - elevated CCP, ESR, CRP. XRs negative for RA   - US with no splenomegaly.   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 (+)EBV IgG/EBNA/VCA/EA. (+) CMV IgG. Low uric acid (2.1), elevated LDH (292), Hapto wnl  - elevated CCP, ESR, CRP. XRs negative for RA   - US with no splenomegaly.   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

## 2025-01-13 NOTE — PROGRESS NOTE ADULT - ASSESSMENT
79F with no report of PMHx and not on home medications, presented with fever and weakness. Found to have severe neutropenia, hyponatremia, and positive for Enterovirus. Admitted to medicine for further workup. Patient is empirically treated with Vanc and Zosyn. Heme was consulted, patient was s/p BMB. 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 Tonja Perry ([facility]), however, lost to follow up.  - diagnosed with RA in 2022, followed Dr. Lucy Almanzar at that time. Further workup indicated RA limited to feet, so NSAID was recommended with close monitor. However, patient lost to follow up subsequently  - Lyme serology positive in 2022, negative confirmatory immunoblot IgG and IgM. Serologic response to B. burgdorferi infection not detected, recommended for follow up in 7-14 days. However, patient lost to follow up

## 2025-01-13 NOTE — PROGRESS NOTE ADULT - SUBJECTIVE AND OBJECTIVE BOX
Hematology Follow-up    INTERVAL HPI/OVERNIGHT EVENTS:  Patient S&E at bedside. No o/n events, patient resting comfortably. No complaints at this time. Patient denies fever, chills, dizziness, weakness, CP, palpitations, SOB, cough, N/V/D/C, dysuria, changes in bowel movements, LE edema.    VITAL SIGNS:  T(F): 98.3 (01-13-25 @ 05:33)  HR: 69 (01-13-25 @ 05:33)  BP: 150/76 (01-13-25 @ 05:33)  RR: 18 (01-13-25 @ 05:33)  SpO2: 98% (01-13-25 @ 05:33)  Wt(kg): --    PHYSICAL EXAM:    Constitutional: AAOx3, NAD,   Eyes: PERRL, EOMI, sclera non-icteric  Neck: supple, no masses, no JVD  Respiratory: CTA b/l, good air entry b/l  Cardiovascular: RRR, normal S1S2, no M/R/G  Gastrointestinal: soft, NTND, no masses palpable, no HSM  Extremities:  no c/c/e  Neurological: Grossly intact  Skin: Normal temperature    MEDICATIONS  (STANDING):  calamine/zinc oxide Lotion 1 Application(s) Topical three times a day  heparin   Injectable 5000 Unit(s) SubCutaneous every 12 hours  piperacillin/tazobactam IVPB.. 3.375 Gram(s) IV Intermittent every 8 hours    MEDICATIONS  (PRN):  acetaminophen     Tablet .. 650 milliGRAM(s) Oral every 6 hours PRN Temp greater or equal to 38C (100.4F), Mild Pain (1 - 3)  aluminum hydroxide/magnesium hydroxide/simethicone Suspension 30 milliLiter(s) Oral every 4 hours PRN Dyspepsia  melatonin 3 milliGRAM(s) Oral at bedtime PRN Insomnia  ondansetron Injectable 4 milliGRAM(s) IV Push every 8 hours PRN Nausea and/or Vomiting      No Known Allergies      LABS:                        11.2   1.73  )-----------( 310      ( 13 Jan 2025 07:26 )             35.4     01-13    136  |  102  |  26[H]  ----------------------------<  109[H]  4.0   |  22  |  1.11    Ca    9.8      13 Jan 2025 07:28  Phos  3.3     01-13  Mg     2.5     01-13    TPro  7.8  /  Alb  3.4  /  TBili  0.3  /  DBili  x   /  AST  26  /  ALT  28  /  AlkPhos  265[H]  01-13       Urinalysis Basic - ( 13 Jan 2025 07:28 )    Color: x / Appearance: x / SG: x / pH: x  Gluc: 109 mg/dL / Ketone: x  / Bili: x / Urobili: x   Blood: x / Protein: x / Nitrite: x   Leuk Esterase: x / RBC: x / WBC x   Sq Epi: x / Non Sq Epi: x / Bacteria: x        RADIOLOGY & ADDITIONAL TESTS:  Studies reviewed.   Hematology Follow-up    INTERVAL HPI/OVERNIGHT EVENTS:  Patient S&E at bedside. No o/n events, patient resting comfortably. No complaints at this time. Team explained likely diagnosis of T-LGL leukemia with son at the bedside.     VITAL SIGNS:  T(F): 98.3 (01-13-25 @ 05:33)  HR: 69 (01-13-25 @ 05:33)  BP: 150/76 (01-13-25 @ 05:33)  RR: 18 (01-13-25 @ 05:33)  SpO2: 98% (01-13-25 @ 05:33)  Wt(kg): --    PHYSICAL EXAM:    Constitutional: AAOx3, NAD  Eyes: PERRL, EOMI, sclera non-icteric  Neck: supple, no masses  Respiratory: CTA b/l, good air entry b/l  Cardiovascular: RRR, normal S1S2  Gastrointestinal: soft, NTND  Extremities:  no c/c/e  Neurological: Grossly intact  Skin: Normal temperature    MEDICATIONS  (STANDING):  calamine/zinc oxide Lotion 1 Application(s) Topical three times a day  heparin   Injectable 5000 Unit(s) SubCutaneous every 12 hours  piperacillin/tazobactam IVPB.. 3.375 Gram(s) IV Intermittent every 8 hours    MEDICATIONS  (PRN):  acetaminophen     Tablet .. 650 milliGRAM(s) Oral every 6 hours PRN Temp greater or equal to 38C (100.4F), Mild Pain (1 - 3)  aluminum hydroxide/magnesium hydroxide/simethicone Suspension 30 milliLiter(s) Oral every 4 hours PRN Dyspepsia  melatonin 3 milliGRAM(s) Oral at bedtime PRN Insomnia  ondansetron Injectable 4 milliGRAM(s) IV Push every 8 hours PRN Nausea and/or Vomiting      No Known Allergies      LABS:                        11.2   1.73  )-----------( 310      ( 13 Jan 2025 07:26 )             35.4     01-13    136  |  102  |  26[H]  ----------------------------<  109[H]  4.0   |  22  |  1.11    Ca    9.8      13 Jan 2025 07:28  Phos  3.3     01-13  Mg     2.5     01-13    TPro  7.8  /  Alb  3.4  /  TBili  0.3  /  DBili  x   /  AST  26  /  ALT  28  /  AlkPhos  265[H]  01-13       Urinalysis Basic - ( 13 Jan 2025 07:28 )    Color: x / Appearance: x / SG: x / pH: x  Gluc: 109 mg/dL / Ketone: x  / Bili: x / Urobili: x   Blood: x / Protein: x / Nitrite: x   Leuk Esterase: x / RBC: x / WBC x   Sq Epi: x / Non Sq Epi: x / Bacteria: x        RADIOLOGY & ADDITIONAL TESTS:  Studies reviewed.

## 2025-01-13 NOTE — PROGRESS NOTE ADULT - PROBLEM SELECTOR PLAN 4
Reported symptoms for several months. Haven't been evaluated outpatient    Plan  - Fluconazole 150mg x1  - outpatient gyn f/u DVT: Lovenox  Diet: regular  Dispo: Appreciated PT eval - Home PT

## 2025-01-13 NOTE — PROGRESS NOTE ADULT - PROBLEM SELECTOR PLAN 5
DVT: Lovenox  Diet: regular  Dispo: Appreciated PT eval - Home PT

## 2025-01-14 ENCOUNTER — TRANSCRIPTION ENCOUNTER (OUTPATIENT)
Age: 80
End: 2025-01-14

## 2025-01-14 VITALS
HEART RATE: 75 BPM | TEMPERATURE: 98 F | RESPIRATION RATE: 18 BRPM | OXYGEN SATURATION: 98 % | SYSTOLIC BLOOD PRESSURE: 145 MMHG | DIASTOLIC BLOOD PRESSURE: 60 MMHG

## 2025-01-14 LAB
ALBUMIN SERPL ELPH-MCNC: 3.4 G/DL — SIGNIFICANT CHANGE UP (ref 3.3–5)
ALP SERPL-CCNC: 237 U/L — HIGH (ref 40–120)
ALT FLD-CCNC: 24 U/L — SIGNIFICANT CHANGE UP (ref 10–45)
ANION GAP SERPL CALC-SCNC: 12 MMOL/L — SIGNIFICANT CHANGE UP (ref 5–17)
AST SERPL-CCNC: 25 U/L — SIGNIFICANT CHANGE UP (ref 10–40)
BASOPHILS # BLD AUTO: 0.07 K/UL — SIGNIFICANT CHANGE UP (ref 0–0.2)
BASOPHILS NFR BLD AUTO: 3.6 % — HIGH (ref 0–2)
BILIRUB SERPL-MCNC: 0.3 MG/DL — SIGNIFICANT CHANGE UP (ref 0.2–1.2)
BUN SERPL-MCNC: 26 MG/DL — HIGH (ref 7–23)
CALCIUM SERPL-MCNC: 9.7 MG/DL — SIGNIFICANT CHANGE UP (ref 8.4–10.5)
CHLORIDE SERPL-SCNC: 105 MMOL/L — SIGNIFICANT CHANGE UP (ref 96–108)
CO2 SERPL-SCNC: 21 MMOL/L — LOW (ref 22–31)
CREAT SERPL-MCNC: 1.22 MG/DL — SIGNIFICANT CHANGE UP (ref 0.5–1.3)
DNA PLOIDY SPEC FC-IMP: SIGNIFICANT CHANGE UP
EGFR: 45 ML/MIN/1.73M2 — LOW
EOSINOPHIL # BLD AUTO: 0.08 K/UL — SIGNIFICANT CHANGE UP (ref 0–0.5)
EOSINOPHIL NFR BLD AUTO: 4.5 % — SIGNIFICANT CHANGE UP (ref 0–6)
GLUCOSE SERPL-MCNC: 115 MG/DL — HIGH (ref 70–99)
HCT VFR BLD CALC: 34.8 % — SIGNIFICANT CHANGE UP (ref 34.5–45)
HGB BLD-MCNC: 10.9 G/DL — LOW (ref 11.5–15.5)
LYMPHOCYTES # BLD AUTO: 1.13 K/UL — SIGNIFICANT CHANGE UP (ref 1–3.3)
LYMPHOCYTES # BLD AUTO: 61.6 % — HIGH (ref 13–44)
MAGNESIUM SERPL-MCNC: 2.3 MG/DL — SIGNIFICANT CHANGE UP (ref 1.6–2.6)
MANUAL SMEAR VERIFICATION: SIGNIFICANT CHANGE UP
MCHC RBC-ENTMCNC: 27.4 PG — SIGNIFICANT CHANGE UP (ref 27–34)
MCHC RBC-ENTMCNC: 31.3 G/DL — LOW (ref 32–36)
MCV RBC AUTO: 87.4 FL — SIGNIFICANT CHANGE UP (ref 80–100)
MONOCYTES # BLD AUTO: 0.39 K/UL — SIGNIFICANT CHANGE UP (ref 0–0.9)
MONOCYTES NFR BLD AUTO: 21.4 % — HIGH (ref 2–14)
NEUTROPHILS # BLD AUTO: 0.16 K/UL — LOW (ref 1.8–7.4)
NEUTROPHILS NFR BLD AUTO: 8.9 % — LOW (ref 43–77)
PHOSPHATE SERPL-MCNC: 3 MG/DL — SIGNIFICANT CHANGE UP (ref 2.5–4.5)
PLAT MORPH BLD: NORMAL — SIGNIFICANT CHANGE UP
PLATELET # BLD AUTO: 303 K/UL — SIGNIFICANT CHANGE UP (ref 150–400)
POTASSIUM SERPL-MCNC: 4.2 MMOL/L — SIGNIFICANT CHANGE UP (ref 3.5–5.3)
POTASSIUM SERPL-SCNC: 4.2 MMOL/L — SIGNIFICANT CHANGE UP (ref 3.5–5.3)
PROT SERPL-MCNC: 7.7 G/DL — SIGNIFICANT CHANGE UP (ref 6–8.3)
RBC # BLD: 3.98 M/UL — SIGNIFICANT CHANGE UP (ref 3.8–5.2)
RBC # FLD: 14 % — SIGNIFICANT CHANGE UP (ref 10.3–14.5)
RBC BLD AUTO: SIGNIFICANT CHANGE UP
SODIUM SERPL-SCNC: 138 MMOL/L — SIGNIFICANT CHANGE UP (ref 135–145)
WBC # BLD: 1.84 K/UL — LOW (ref 3.8–10.5)
WBC # FLD AUTO: 1.84 K/UL — LOW (ref 3.8–10.5)

## 2025-01-14 PROCEDURE — 82962 GLUCOSE BLOOD TEST: CPT

## 2025-01-14 PROCEDURE — 84156 ASSAY OF PROTEIN URINE: CPT

## 2025-01-14 PROCEDURE — 88264 CHROMOSOME ANALYSIS 20-25: CPT

## 2025-01-14 PROCEDURE — 87040 BLOOD CULTURE FOR BACTERIA: CPT

## 2025-01-14 PROCEDURE — 85018 HEMOGLOBIN: CPT

## 2025-01-14 PROCEDURE — 88342 IMHCHEM/IMCYTCHM 1ST ANTB: CPT

## 2025-01-14 PROCEDURE — 85025 COMPLETE CBC W/AUTO DIFF WBC: CPT

## 2025-01-14 PROCEDURE — 0241U: CPT

## 2025-01-14 PROCEDURE — 86645 CMV ANTIBODY IGM: CPT

## 2025-01-14 PROCEDURE — 86664 EPSTEIN-BARR NUCLEAR ANTIGEN: CPT

## 2025-01-14 PROCEDURE — 82728 ASSAY OF FERRITIN: CPT

## 2025-01-14 PROCEDURE — 97116 GAIT TRAINING THERAPY: CPT

## 2025-01-14 PROCEDURE — 86431 RHEUMATOID FACTOR QUANT: CPT

## 2025-01-14 PROCEDURE — 84540 ASSAY OF URINE/UREA-N: CPT

## 2025-01-14 PROCEDURE — 87640 STAPH A DNA AMP PROBE: CPT

## 2025-01-14 PROCEDURE — 83935 ASSAY OF URINE OSMOLALITY: CPT

## 2025-01-14 PROCEDURE — 81001 URINALYSIS AUTO W/SCOPE: CPT

## 2025-01-14 PROCEDURE — 71045 X-RAY EXAM CHEST 1 VIEW: CPT

## 2025-01-14 PROCEDURE — 84100 ASSAY OF PHOSPHORUS: CPT

## 2025-01-14 PROCEDURE — 84132 ASSAY OF SERUM POTASSIUM: CPT

## 2025-01-14 PROCEDURE — 87799 DETECT AGENT NOS DNA QUANT: CPT

## 2025-01-14 PROCEDURE — 92610 EVALUATE SWALLOWING FUNCTION: CPT

## 2025-01-14 PROCEDURE — 83735 ASSAY OF MAGNESIUM: CPT

## 2025-01-14 PROCEDURE — 88280 CHROMOSOME KARYOTYPE STUDY: CPT

## 2025-01-14 PROCEDURE — 80048 BASIC METABOLIC PNL TOTAL CA: CPT

## 2025-01-14 PROCEDURE — 82746 ASSAY OF FOLIC ACID SERUM: CPT

## 2025-01-14 PROCEDURE — 86644 CMV ANTIBODY: CPT

## 2025-01-14 PROCEDURE — 99239 HOSP IP/OBS DSCHRG MGMT >30: CPT | Mod: GC

## 2025-01-14 PROCEDURE — 81340 TRB@ GENE REARRANGE AMPLIFY: CPT

## 2025-01-14 PROCEDURE — 36415 COLL VENOUS BLD VENIPUNCTURE: CPT

## 2025-01-14 PROCEDURE — 82330 ASSAY OF CALCIUM: CPT

## 2025-01-14 PROCEDURE — 99285 EMERGENCY DEPT VISIT HI MDM: CPT | Mod: 25

## 2025-01-14 PROCEDURE — 84300 ASSAY OF URINE SODIUM: CPT

## 2025-01-14 PROCEDURE — 83605 ASSAY OF LACTIC ACID: CPT

## 2025-01-14 PROCEDURE — 85014 HEMATOCRIT: CPT

## 2025-01-14 PROCEDURE — 85097 BONE MARROW INTERPRETATION: CPT

## 2025-01-14 PROCEDURE — 96365 THER/PROPH/DIAG IV INF INIT: CPT

## 2025-01-14 PROCEDURE — 88313 SPECIAL STAINS GROUP 2: CPT

## 2025-01-14 PROCEDURE — 86704 HEP B CORE ANTIBODY TOTAL: CPT

## 2025-01-14 PROCEDURE — 88305 TISSUE EXAM BY PATHOLOGIST: CPT

## 2025-01-14 PROCEDURE — 86021 WBC ANTIBODY IDENTIFICATION: CPT

## 2025-01-14 PROCEDURE — 80053 COMPREHEN METABOLIC PANEL: CPT

## 2025-01-14 PROCEDURE — 84295 ASSAY OF SERUM SODIUM: CPT

## 2025-01-14 PROCEDURE — 81450 HL NEO GSAP 5-50DNA/DNA&RNA: CPT

## 2025-01-14 PROCEDURE — 82435 ASSAY OF BLOOD CHLORIDE: CPT

## 2025-01-14 PROCEDURE — 83550 IRON BINDING TEST: CPT

## 2025-01-14 PROCEDURE — 81245 FLT3 GENE: CPT

## 2025-01-14 PROCEDURE — 0225U NFCT DS DNA&RNA 21 SARSCOV2: CPT

## 2025-01-14 PROCEDURE — 73610 X-RAY EXAM OF ANKLE: CPT

## 2025-01-14 PROCEDURE — 83036 HEMOGLOBIN GLYCOSYLATED A1C: CPT

## 2025-01-14 PROCEDURE — 88341 IMHCHEM/IMCYTCHM EA ADD ANTB: CPT

## 2025-01-14 PROCEDURE — 73620 X-RAY EXAM OF FOOT: CPT

## 2025-01-14 PROCEDURE — 88360 TUMOR IMMUNOHISTOCHEM/MANUAL: CPT

## 2025-01-14 PROCEDURE — 82607 VITAMIN B-12: CPT

## 2025-01-14 PROCEDURE — 86663 EPSTEIN-BARR ANTIBODY: CPT

## 2025-01-14 PROCEDURE — 87205 SMEAR GRAM STAIN: CPT

## 2025-01-14 PROCEDURE — 83930 ASSAY OF BLOOD OSMOLALITY: CPT

## 2025-01-14 PROCEDURE — 81338 MPL GENE COMMON VARIANTS: CPT

## 2025-01-14 PROCEDURE — 88184 FLOWCYTOMETRY/ TC 1 MARKER: CPT

## 2025-01-14 PROCEDURE — 87086 URINE CULTURE/COLONY COUNT: CPT

## 2025-01-14 PROCEDURE — 84145 PROCALCITONIN (PCT): CPT

## 2025-01-14 PROCEDURE — 85610 PROTHROMBIN TIME: CPT

## 2025-01-14 PROCEDURE — 83615 LACTATE (LD) (LDH) ENZYME: CPT

## 2025-01-14 PROCEDURE — 87389 HIV-1 AG W/HIV-1&-2 AB AG IA: CPT

## 2025-01-14 PROCEDURE — 84550 ASSAY OF BLOOD/URIC ACID: CPT

## 2025-01-14 PROCEDURE — 81207 BCR/ABL1 GENE MINOR BP: CPT

## 2025-01-14 PROCEDURE — 82947 ASSAY GLUCOSE BLOOD QUANT: CPT

## 2025-01-14 PROCEDURE — 82803 BLOOD GASES ANY COMBINATION: CPT

## 2025-01-14 PROCEDURE — 80061 LIPID PANEL: CPT

## 2025-01-14 PROCEDURE — 88237 TISSUE CULTURE BONE MARROW: CPT

## 2025-01-14 PROCEDURE — 88185 FLOWCYTOMETRY/TC ADD-ON: CPT

## 2025-01-14 PROCEDURE — 81219 CALR GENE COM VARIANTS: CPT

## 2025-01-14 PROCEDURE — 96375 TX/PRO/DX INJ NEW DRUG ADDON: CPT

## 2025-01-14 PROCEDURE — 97110 THERAPEUTIC EXERCISES: CPT

## 2025-01-14 PROCEDURE — 73110 X-RAY EXAM OF WRIST: CPT

## 2025-01-14 PROCEDURE — 97161 PT EVAL LOW COMPLEX 20 MIN: CPT

## 2025-01-14 PROCEDURE — 85730 THROMBOPLASTIN TIME PARTIAL: CPT

## 2025-01-14 PROCEDURE — 80074 ACUTE HEPATITIS PANEL: CPT

## 2025-01-14 PROCEDURE — 84443 ASSAY THYROID STIM HORMONE: CPT

## 2025-01-14 PROCEDURE — 87637 SARSCOV2&INF A&B&RSV AMP PRB: CPT

## 2025-01-14 PROCEDURE — 86480 TB TEST CELL IMMUN MEASURE: CPT

## 2025-01-14 PROCEDURE — 81246 FLT3 GENE ANALYSIS: CPT

## 2025-01-14 PROCEDURE — 83540 ASSAY OF IRON: CPT

## 2025-01-14 PROCEDURE — 81451 HL NEO GSAP 5-50 RNA ALYS: CPT

## 2025-01-14 PROCEDURE — 87449 NOS EACH ORGANISM AG IA: CPT

## 2025-01-14 PROCEDURE — 86665 EPSTEIN-BARR CAPSID VCA: CPT

## 2025-01-14 PROCEDURE — 82570 ASSAY OF URINE CREATININE: CPT

## 2025-01-14 PROCEDURE — 81206 BCR/ABL1 GENE MAJOR BP: CPT

## 2025-01-14 PROCEDURE — 83010 ASSAY OF HAPTOGLOBIN QUANT: CPT

## 2025-01-14 PROCEDURE — 86200 CCP ANTIBODY: CPT

## 2025-01-14 PROCEDURE — 87641 MR-STAPH DNA AMP PROBE: CPT

## 2025-01-14 PROCEDURE — 76705 ECHO EXAM OF ABDOMEN: CPT

## 2025-01-14 PROCEDURE — 73120 X-RAY EXAM OF HAND: CPT

## 2025-01-14 RX ORDER — CALAMINE
1 LOTION (ML) TOPICAL
Qty: 1 | Refills: 0
Start: 2025-01-14 | End: 2025-01-27

## 2025-01-14 RX ORDER — CALAMINE
1 LOTION (ML) TOPICAL
Qty: 0 | Refills: 0 | DISCHARGE
Start: 2025-01-14

## 2025-01-14 RX ADMIN — Medication 1 APPLICATION(S): at 13:10

## 2025-01-14 RX ADMIN — Medication 5 MILLIGRAM(S): at 13:10

## 2025-01-14 RX ADMIN — HEPARIN SODIUM 5000 UNIT(S): 1000 INJECTION, SOLUTION INTRAVENOUS; SUBCUTANEOUS at 05:32

## 2025-01-14 NOTE — PROGRESS NOTE ADULT - ATTENDING COMMENTS
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     fluid restrict to 1 L   encourage protein/ normal salt intake  no more fluids needed     roxi hong  nephrology attending   please contact me on TEAMS   Office- [phone]
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     fluid restrict to 1 L   encourage protein/ normal salt intake  no more fluids needed     we will sign off. please call with questions     roxi hong  nephrology attending   please contact me on TEAMS   Office- [phone]
roxi hong  nephrology attending   please contact me on TEAMS   Office- [phone]
roxi hong  nephrology attending   please contact me on TEAMS   Office- [phone]
79-yr-old woman with known h/o neutropenia admitted with fever URI symptoms due to enterorhinovirus infection dropped ANC from 120 on admission to 30 subsequently. 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 Patient may be discharged for outpatient follow up as there is no urgent need for initiating inpatient chemotherapy. Supportive.
While serologically + no clinical activity nor imaging findings. Hallmark s/s of RA reviewed with pt and son at bedside, advised to f/u promptly with rheum as outpatient if develops classic sx. Please provide my office information on discharge paperwork. Rheumatology will stop actively following, please reconsult as needed.
79F Nauruan-sp w/ possible hx of RA and hypothyroidism not on any meds who presents with fever, cough and general malaise, admitted for neutropenic fever and hyponatremia. Entero/rhinovirus+.     #Neutropenic fever  #Sepsis, entero/rhinovirus+  #Hyponatremia, SIADH, resolved    - Unknown chronicity of neutropenia, also noted in 2022. Will evaluate for infectious and nutritional etiologies. No hx medications. Hematology consulted, s/p BMB bx 1/7. Rheumatology consulted for autoimmune causes given hx of abnormal JOSELUIS, RF. No splenomegaly on US. XRs pending.  - Elevated procal noted. BCx NGTD. Afebrile since 1/6. C/w zosyn. Plan for d/c on cipro+augmentin   - DCP: PT rec home PT. Family requesting outpatient PT. Pending prelim bmb bx results.    Rest as above.
-Labs stable. No fever. Feels well off abx.   -C/w amlodipine 5mg daily for BP control. D/w son at bedside. He will have her see PMD outpatient. Gave son copy of labs.   -Heme f/u outpt.   -Stable for DC. -33 minutes spent on DC process.   -Plan discussed with house staff.
79F Bulgarian-sp w/ possible hx of RA and hypothyroidism not on any meds who presents with fever, cough and general malaise, admitted for neutropenic fever and hyponatremia. Entero/rhinovirus+.     #Neutropenic fever  #Sepsis, entero/rhinovirus+  #Hyponatremia, SIADH  #Vaginal itching  - Unknown chronicity of neutropenia, also noted in 2022. Will evaluate for infectious and nutritional etiologies. No hx medications. Hematology consult.  - Elevated procal noted. C/w empiric antibiotics pending cultures, MRSA swab.   - fluid restriction, would likely benefit from HTS. Nephrology consult  - diflucan x1, will consider another dose in 72h if no sx relief. Otherwise, outpatient gyn follow up.    Rest as above.
79F Georgian-sp w/ possible hx of RA and hypothyroidism not on any meds who presents with fever, cough and general malaise, admitted for neutropenic fever and hyponatremia. Entero/rhinovirus+.     #Neutropenic fever  #Sepsis, entero/rhinovirus+  #Hyponatremia, SIADH, resolved    - Unknown chronicity of neutropenia, also noted in 2022. Will evaluate for infectious and nutritional etiologies. No hx medications. Hematology consulted, s/p BMB bx 1/7. Rheumatology consulted for autoimmune causes given hx of abnormal JOSELUIS, RF. No splenomegaly on US. XRs pending.  - Elevated procal noted. BCx NGTD. Afebrile since 1/6. C/w zosyn. Plan for d/c on cipro+augmentin 7d course  - DCP: PT rec home PT. Family requesting outpatient PT. Pending prelim bmb bx results.    Rest as above.
79F Malay-sp w/ possible hx of RA and hypothyroidism not on any meds who presents with fever, cough and general malaise, admitted for neutropenic fever and hyponatremia. Entero/rhinovirus+.     #Neutropenic fever  #Sepsis, entero/rhinovirus+  #Hyponatremia, SIADH, resolved    - Unknown chronicity of neutropenia, also noted in 2022. Will evaluate for infectious and nutritional etiologies. No hx medications. Hematology consulted, s/p BMB bx 1/7, prelim results concerning for T-LGL. Possible treatment initiation inpatient on Monday.  - Rheumatology consulted for autoimmune causes given hx of abnormal JOSELUIS, RF. No splenomegaly on US. Outpatient rheum follow up if any joint sx.  - Elevated procal noted. BCx NGTD. Afebrile since 1/6. Empiric abx for 7d. C/w zosyn, EOT 1/13.  - DCP: PT rec home PT. Family requesting outpatient PT. Tx pending further BMB bx path and heme recs.    Rest as above.
79F Northern Irish-sp w/ possible hx of RA and hypothyroidism not on any meds who presents with fever, cough and general malaise, admitted for neutropenic fever and hyponatremia. Entero/rhinovirus+.     #Neutropenic fever  #Sepsis, entero/rhinovirus+  #Hyponatremia, SIADH, resolved    - Unknown chronicity of neutropenia, also noted in 2022. Will evaluate for infectious and nutritional etiologies. No hx medications. Hematology consulted, s/p BMB bx 1/7. D/w hematology, d/c pending further results from BMB.  - Rheumatology consulted for autoimmune causes given hx of abnormal JOSELUIS, RF. No splenomegaly on US. Outpatient rheum follow up if any joint sx.  - Elevated procal noted. BCx NGTD. Afebrile since 1/6. C/w zosyn. Plan for d/c on cipro+augmentin 7d course. EOT 1/13.  - DCP: PT rec home PT. Family requesting outpatient PT. Pending prelim bmb bx results.    Rest as above.
79F Djiboutian-sp w/ possible hx of RA and hypothyroidism not on any meds who presents with fever, cough and general malaise, admitted for neutropenic fever and hyponatremia. Entero/rhinovirus+.     #Neutropenic fever  #Sepsis, entero/rhinovirus+  #Hyponatremia, SIADH  #Vaginal itching  - Unknown chronicity of neutropenia, also noted in 2022. Will evaluate for infectious and nutritional etiologies. No hx medications. Hematology consulted, to review flow cytometry and peripheral smear with possible plan for BMB. Rheumatology consulted for hx of abnormal JOSELUIS, RF.  - Elevated procal noted. C/w empiric antibiotics pending cultures. MRSA neg, d/c vanc.  - fluid restriction, Na improved s/p HTS. Repeat BMP today to assess need for salt tabs  - diflucan x1, will consider another dose after 72h if no sx relief. Otherwise, outpatient gyn follow up.    Rest as above.
-Heme appreciated; outpatient f/u ok.   -Complete zosyn tonight. If afebrile and improved/stable tomorrow, plan for DC tomorrow afternoon when son can pick her up.   -Would start anti-hypertensive for elevated BP. Outpatient f/u thereafter.   -Discussed with son at bedside.   -Plan discussed with house staff.
79F Pakistani-sp w/ possible hx of RA and hypothyroidism not on any meds who presents with fever, cough and general malaise, admitted for neutropenic fever and hyponatremia. Entero/rhinovirus+.     #Neutropenic fever  #Sepsis, entero/rhinovirus+  #Hyponatremia, SIADH, resolved    - Unknown chronicity of neutropenia, also noted in 2022. Will evaluate for infectious and nutritional etiologies. No hx medications. Hematology consulted, s/p BMB bx 1/7, prelim results concerning for T-LGL  - Rheumatology consulted for autoimmune causes given hx of abnormal JOSELUIS, RF. No splenomegaly on US. Outpatient rheum follow up if any joint sx.  - Elevated procal noted. BCx NGTD. Afebrile since 1/6. Empiric abx for 7d. C/w zosyn, EOT 1/13.  - DCP: PT rec home PT. Family requesting outpatient PT. Tx pending further BMB bx path and heme recs.    Rest as above.

## 2025-01-14 NOTE — PROGRESS NOTE ADULT - PROBLEM SELECTOR PLAN 2
Improved and resolved with NS IVF and increased PO intake  - likely SIADH vs poor intake   - Appreciated Nephro recs    Plan  - Fluid restriction 1.5 L  - Trend cmp

## 2025-01-14 NOTE — PROGRESS NOTE ADULT - SUBJECTIVE AND OBJECTIVE BOX
PROGRESS NOTE:     Patient is a 79y old  Female who presents with a chief complaint of Sepsis (13 Jan 2025 11:56)      INTERVAL EVENTS: No acute overnight events.     SUBJECTIVE: Patient seen and examined at bedside. This morning, the patient is comfortable and doing well. No acute complaints.    MEDICATIONS  (STANDING):  calamine/zinc oxide Lotion 1 Application(s) Topical three times a day  heparin   Injectable 5000 Unit(s) SubCutaneous every 12 hours    MEDICATIONS  (PRN):  acetaminophen     Tablet .. 650 milliGRAM(s) Oral every 6 hours PRN Temp greater or equal to 38C (100.4F), Mild Pain (1 - 3)  aluminum hydroxide/magnesium hydroxide/simethicone Suspension 30 milliLiter(s) Oral every 4 hours PRN Dyspepsia  melatonin 3 milliGRAM(s) Oral at bedtime PRN Insomnia  ondansetron Injectable 4 milliGRAM(s) IV Push every 8 hours PRN Nausea and/or Vomiting      CAPILLARY BLOOD GLUCOSE        I&O's Summary    13 Jan 2025 07:01  -  14 Jan 2025 07:00  --------------------------------------------------------  IN: 100 mL / OUT: 0 mL / NET: 100 mL        PHYSICAL EXAM:  Vital Signs Last 24 Hrs  T(C): 36.7 (14 Jan 2025 04:50), Max: 37 (13 Jan 2025 13:39)  T(F): 98.1 (14 Jan 2025 04:50), Max: 98.6 (13 Jan 2025 13:39)  HR: 68 (14 Jan 2025 04:50) (68 - 70)  BP: 154/66 (14 Jan 2025 04:50) (154/66 - 163/73)  BP(mean): --  RR: 18 (14 Jan 2025 04:50) (16 - 18)  SpO2: 98% (14 Jan 2025 04:50) (97% - 98%)    Parameters below as of 14 Jan 2025 04:50  Patient On (Oxygen Delivery Method): room air        GENERAL: NAD, lying in bed comfortably  HEAD: Atraumatic, normocephalic  EYES: EOMI, PERRLA, conjunctiva and sclera clear  ENT: Moist mucous membranes  NECK: Supple, no JVD  HEART: S1, S2, Regular rate and rhythm, no murmurs, rubs, or gallops  LUNGS: Unlabored respirations, clear to auscultation bilaterally, no crackles, wheezing, or rhonchi  ABDOMEN: Soft, nontender, nondistended, +BS  EXTREMITIES: 2+ peripheral pulses bilaterally. No clubbing, cyanosis, or edema  NERVOUS SYSTEM:  A&Ox3, no focal deficits   SKIN: No rashes or lesions    LABS:                        10.9   1.84  )-----------( 303      ( 14 Jan 2025 06:34 )             34.8     01-14    138  |  105  |  26[H]  ----------------------------<  115[H]  4.2   |  21[L]  |  1.22    Ca    9.7      14 Jan 2025 06:34  Phos  3.0     01-14  Mg     2.3     01-14    TPro  7.7  /  Alb  3.4  /  TBili  0.3  /  DBili  x   /  AST  25  /  ALT  24  /  AlkPhos  237[H]  01-14          Urinalysis Basic - ( 14 Jan 2025 06:34 )    Color: x / Appearance: x / SG: x / pH: x  Gluc: 115 mg/dL / Ketone: x  / Bili: x / Urobili: x   Blood: x / Protein: x / Nitrite: x   Leuk Esterase: x / RBC: x / WBC x   Sq Epi: x / Non Sq Epi: x / Bacteria: x          RADIOLOGY & ADDITIONAL TESTS:  Results Reviewed:   Imaging Personally Reviewed:  Electrocardiogram Personally Reviewed:  Tele:

## 2025-01-14 NOTE — PROGRESS NOTE ADULT - ASSESSMENT
79F with no report of PMHx and not on home medications, presented with fever and weakness. Found to have severe neutropenia, hyponatremia, and positive for Enterovirus. Admitted to medicine for further workup. Patient is empirically treated with Vanc and Zosyn. Heme was consulted, patient was s/p BMB. 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 Tonja Perry ([facility]), however, lost to follow up.  - diagnosed with RA in 2022, followed Dr. Lucy Almanzar at that time. Further workup indicated RA limited to feet, so NSAID was recommended with close monitor. However, patient lost to follow up subsequently  - Lyme serology positive in 2022, negative confirmatory immunoblot IgG and IgM. Serologic response to B. burgdorferi infection not detected, recommended for follow up in 7-14 days. However, patient lost to follow up

## 2025-01-14 NOTE — DISCHARGE NOTE NURSING/CASE MANAGEMENT/SOCIAL WORK - FINANCIAL ASSISTANCE
[facility] provides services at a reduced cost to those who are determined to be eligible through [facility]’s financial assistance program. Information regarding [facility]’s financial assistance program can be found by going to [email or web address] or by calling [phone].

## 2025-01-14 NOTE — PROGRESS NOTE ADULT - PROVIDER SPECIALTY LIST ADULT
Heme/Onc
Rheumatology
Internal Medicine
Nephrology
Internal Medicine

## 2025-01-14 NOTE — DISCHARGE NOTE NURSING/CASE MANAGEMENT/SOCIAL WORK - NSDCFUADDAPPT_GEN_ALL_CORE_FT
APPTS ARE READY TO BE MADE: [X] YES    Best Family or Patient Contact (if needed): Self, son    Additional Information about above appointments (if needed):    1: PCP  2: Hematology Dr Morales   3: Rheumatology    Other comments or requests:

## 2025-01-14 NOTE — PROGRESS NOTE ADULT - PROBLEM SELECTOR PLAN 3
h/o RA in 2022.  Further workup showed limited to feet, managed with NSAID  This admission  - Appreciate Rheum assessment  - serology positive (CCP, ESR, CRP). However, no clinical evidence nor imaging findings suggest RA    Plan:  - follow up outpatient with Dr. Villalta at [facility] if develops RA hallmark symptoms

## 2025-01-14 NOTE — PROGRESS NOTE ADULT - NUTRITIONAL ASSESSMENT
This patient has been assessed with a concern for Malnutrition and has been determined to have a diagnosis/diagnoses of Underweight (BMI < 19).    This patient is being managed with:   Diet Regular-  1500mL Fluid Restriction ([id number])  Supplement Feeding Modality:  Oral  Ensure Plus High Protein Cans or Servings Per Day:  1       Frequency:  Daily  Entered: Jan 8 2025  2:13PM  
This patient has been assessed with a concern for Malnutrition and has been determined to have a diagnosis/diagnoses of Underweight (BMI < 19).    This patient is being managed with:   Diet Regular-  1500mL Fluid Restriction ([id number])  Supplement Feeding Modality:  Oral  Ensure Plus High Protein Cans or Servings Per Day:  1       Frequency:  Daily  Entered: Jan 8 2025  2:13PM  
This patient has been assessed with a concern for Malnutrition and has been determined to have a diagnosis/diagnoses of Underweight (BMI < 19).    This patient is being managed with:   Diet Regular-  1500mL Fluid Restriction ([id number])  Supplement Feeding Modality:  Oral  Ensure Plus High Protein Cans or Servings Per Day:  1       Frequency:  Daily  Entered: Jan 8 2025  2:13PM  
This patient has been assessed with a concern for Malnutrition and has been determined to have a diagnosis/diagnoses of Underweight (BMI < 19).    This patient is being managed with:   Diet Regular-  1500mL Fluid Restriction ([id number])  Supplement Feeding Modality:  Oral  Ensure Plus High Protein Cans or Servings Per Day:  1       Frequency:  Daily  Entered: Jan 8 2025  2:13PM  
This patient has been assessed with a concern for Malnutrition and has been determined to have a diagnosis/diagnoses of Underweight (BMI < 19).    This patient is being managed with:   Diet Regular-  1500mL Fluid Restriction ([id number])  Supplement Feeding Modality:  Oral  Ensure Plus High Protein Cans or Servings Per Day:  1       Frequency:  Daily  Entered: Jan 8 2025  2:13PM  
This patient has been assessed with a concern for Malnutrition and has been determined to have a diagnosis/diagnoses of Underweight (BMI < 19).    This patient is being managed with:   Diet Regular-  1500mL Fluid Restriction ([id number])  Supplement Feeding Modality:  Oral  Ensure Plus High Protein Cans or Servings Per Day:  1       Frequency:  Daily  Entered: Jan 8 2025  2:13PM

## 2025-01-14 NOTE — PROGRESS NOTE ADULT - PROBLEM SELECTOR PLAN 1
presented with fever and severe neutropenia -  on admission --> 0 (1/6)    Workup:   (+) Enterovirus. (+)EBV IgG/EBNA/VCA/EA. (+) CMV IgG. Low uric acid (2.1), elevated LDH (292), Hapto wnl  - elevated CCP, ESR, CRP. XRs negative for RA   - US with no splenomegaly.   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

## 2025-01-14 NOTE — DISCHARGE NOTE NURSING/CASE MANAGEMENT/SOCIAL WORK - PATIENT PORTAL LINK FT
You can access the FollowMyHealth Patient Portal offered by [facility] by registering at the following website: [email or web address]. By joining [employer]’s FollowMyHealth portal, you will also be able to view your health information using other applications (apps) compatible with our system.

## 2025-01-14 NOTE — DISCHARGE NOTE NURSING/CASE MANAGEMENT/SOCIAL WORK - NSDCPEFALRISK_GEN_ALL_CORE
For information on Fall & Injury Prevention, visit: [email or web address] OR  [email or web address] OR  https://www.cdc.gov/steadi/patient.html

## 2025-01-15 ENCOUNTER — NON-APPOINTMENT (OUTPATIENT)
Age: 80
End: 2025-01-15

## 2025-01-17 ENCOUNTER — TRANSCRIPTION ENCOUNTER (OUTPATIENT)
Age: 80
End: 2025-01-17

## 2025-01-17 ENCOUNTER — OUTPATIENT (OUTPATIENT)
Dept: OUTPATIENT SERVICES | Facility: HOSPITAL | Age: 80
LOS: 1 days | Discharge: HOME | End: 2025-01-17

## 2025-01-17 DIAGNOSIS — C91.Z0 OTHER LYMPHOID LEUKEMIA NOT HAVING ACHIEVED REMISSION: ICD-10-CM

## 2025-01-17 LAB
ANCA AB TITR SER: NEGATIVE — SIGNIFICANT CHANGE UP
CANCER MULTIGENE ANALYSIS BLD/T: (no result)
DETAILED GENETIC INTERPRETATION 1: SIGNIFICANT CHANGE UP
REFERENCES 1: SIGNIFICANT CHANGE UP

## 2025-01-19 PROBLEM — Z00.00 ENCOUNTER FOR PREVENTIVE HEALTH EXAMINATION: Status: ACTIVE | Noted: 2025-01-15

## 2025-01-21 ENCOUNTER — RESULT REVIEW (OUTPATIENT)
Age: 80
End: 2025-01-21

## 2025-01-21 ENCOUNTER — APPOINTMENT (OUTPATIENT)
Dept: HEMATOLOGY ONCOLOGY | Facility: CLINIC | Age: 80
End: 2025-01-21
Payer: MEDICARE

## 2025-01-21 ENCOUNTER — LABORATORY RESULT (OUTPATIENT)
Age: 80
End: 2025-01-21

## 2025-01-21 VITALS
DIASTOLIC BLOOD PRESSURE: 72 MMHG | BODY MASS INDEX: 22.85 KG/M2 | HEIGHT: 61.26 IN | TEMPERATURE: 98.4 F | OXYGEN SATURATION: 99 % | RESPIRATION RATE: 16 BRPM | SYSTOLIC BLOOD PRESSURE: 167 MMHG | WEIGHT: 122.58 LBS | HEART RATE: 70 BPM

## 2025-01-21 DIAGNOSIS — I10 ESSENTIAL (PRIMARY) HYPERTENSION: ICD-10-CM

## 2025-01-21 DIAGNOSIS — E03.9 HYPOTHYROIDISM, UNSPECIFIED: ICD-10-CM

## 2025-01-21 DIAGNOSIS — D70.9 NEUTROPENIA, UNSPECIFIED: ICD-10-CM

## 2025-01-21 DIAGNOSIS — C91.Z0 OTHER LYMPHOID LEUKEMIA NOT HAVING ACHIEVED REMISSION: ICD-10-CM

## 2025-01-21 DIAGNOSIS — Z63.5 DISRUPTION OF FAMILY BY SEPARATION AND DIVORCE: ICD-10-CM

## 2025-01-21 DIAGNOSIS — Z78.9 OTHER SPECIFIED HEALTH STATUS: ICD-10-CM

## 2025-01-21 DIAGNOSIS — Z80.9 FAMILY HISTORY OF MALIGNANT NEOPLASM, UNSPECIFIED: ICD-10-CM

## 2025-01-21 LAB
BASOPHILS # BLD AUTO: 0 K/UL — SIGNIFICANT CHANGE UP (ref 0–0.2)
BASOPHILS NFR BLD AUTO: 0 % — SIGNIFICANT CHANGE UP (ref 0–2)
CHROM ANALY OVERALL INTERP SPEC-IMP: SIGNIFICANT CHANGE UP
EOSINOPHIL # BLD AUTO: 0.03 K/UL — SIGNIFICANT CHANGE UP (ref 0–0.5)
EOSINOPHIL NFR BLD AUTO: 1 % — SIGNIFICANT CHANGE UP (ref 0–6)
HCT VFR BLD CALC: 35.3 % — SIGNIFICANT CHANGE UP (ref 34.5–45)
HGB BLD-MCNC: 11.5 G/DL — SIGNIFICANT CHANGE UP (ref 11.5–15.5)
LYMPHOCYTES # BLD AUTO: 2.04 K/UL — SIGNIFICANT CHANGE UP (ref 1–3.3)
LYMPHOCYTES # BLD AUTO: 73 % — HIGH (ref 13–44)
MCHC RBC-ENTMCNC: 27.9 PG — SIGNIFICANT CHANGE UP (ref 27–34)
MCHC RBC-ENTMCNC: 32.6 G/DL — SIGNIFICANT CHANGE UP (ref 32–36)
MCV RBC AUTO: 85.7 FL — SIGNIFICANT CHANGE UP (ref 80–100)
MONOCYTES # BLD AUTO: 0.7 K/UL — SIGNIFICANT CHANGE UP (ref 0–0.9)
MONOCYTES NFR BLD AUTO: 25 % — HIGH (ref 2–14)
NEUTROPHILS # BLD AUTO: 0.03 K/UL — LOW (ref 1.8–7.4)
NEUTROPHILS NFR BLD AUTO: 1 % — LOW (ref 43–77)
NRBC # BLD: 0 /100 WBCS — SIGNIFICANT CHANGE UP (ref 0–0)
NRBC # BLD: SIGNIFICANT CHANGE UP /100 WBCS (ref 0–0)
NRBC BLD-RTO: 0 /100 WBCS — SIGNIFICANT CHANGE UP (ref 0–0)
NRBC BLD-RTO: SIGNIFICANT CHANGE UP /100 WBCS (ref 0–0)
PLAT MORPH BLD: NORMAL — SIGNIFICANT CHANGE UP
PLATELET # BLD AUTO: 325 K/UL — SIGNIFICANT CHANGE UP (ref 150–400)
RBC # BLD: 4.12 M/UL — SIGNIFICANT CHANGE UP (ref 3.8–5.2)
RBC # FLD: 14.4 % — SIGNIFICANT CHANGE UP (ref 10.3–14.5)
RBC BLD AUTO: SIGNIFICANT CHANGE UP
SMUDGE CELLS # BLD: PRESENT — SIGNIFICANT CHANGE UP
WBC # BLD: 2.79 K/UL — LOW (ref 3.8–10.5)
WBC # FLD AUTO: 2.79 K/UL — LOW (ref 3.8–10.5)

## 2025-01-21 PROCEDURE — 99417 PROLNG OP E/M EACH 15 MIN: CPT

## 2025-01-21 PROCEDURE — 99215 OFFICE O/P EST HI 40 MIN: CPT

## 2025-01-21 SDOH — SOCIAL STABILITY - SOCIAL INSECURITY: DISRUPTION OF FAMILY BY SEPARATION AND DIVORCE: Z63.5

## 2025-01-23 LAB
ALBUMIN SERPL ELPH-MCNC: 4.3 G/DL
ALP BLD-CCNC: 213 U/L
ALT SERPL-CCNC: 21 U/L
ANION GAP SERPL CALC-SCNC: 9 MMOL/L
AST SERPL-CCNC: 27 U/L
BILIRUB SERPL-MCNC: 0.4 MG/DL
BUN SERPL-MCNC: 20 MG/DL
CALCIUM SERPL-MCNC: 10.3 MG/DL
CHLORIDE SERPL-SCNC: 101 MMOL/L
CO2 SERPL-SCNC: 26 MMOL/L
CREAT SERPL-MCNC: 0.88 MG/DL
EGFR: 66 ML/MIN/1.73M2
GGT SERPL-CCNC: 106 U/L
GLUCOSE SERPL-MCNC: 101 MG/DL
LDH SERPL-CCNC: 145 U/L
POTASSIUM SERPL-SCNC: 4.3 MMOL/L
PROT SERPL-MCNC: 8.2 G/DL
SODIUM SERPL-SCNC: 137 MMOL/L

## 2025-01-26 PROBLEM — D70.9 NEUTROPENIA: Status: ACTIVE | Noted: 2025-01-26

## 2025-01-26 PROBLEM — I10 HYPERTENSION: Status: ACTIVE | Noted: 2025-01-26

## 2025-01-26 PROBLEM — Z80.9 FAMILY HISTORY OF MALIGNANT NEOPLASM: Status: ACTIVE | Noted: 2025-01-26

## 2025-01-26 PROBLEM — E03.9 HYPOTHYROIDISM, UNSPECIFIED TYPE: Status: ACTIVE | Noted: 2025-01-26

## 2025-01-26 PROBLEM — Z63.5 DIVORCED: Status: ACTIVE | Noted: 2025-01-26

## 2025-01-26 PROBLEM — Z78.9 GRAVIDA 2 PARA 2: Status: RESOLVED | Noted: 2025-01-26 | Resolved: 2025-01-26

## 2025-01-26 RX ORDER — LACTOSE-REDUCED FOOD
LIQUID (ML) ORAL
Refills: 0 | Status: ACTIVE | COMMUNITY
Start: 2025-01-26

## 2025-01-26 RX ORDER — AMLODIPINE BESYLATE 5 MG/1
5 TABLET ORAL DAILY
Refills: 0 | Status: ACTIVE | COMMUNITY
Start: 2025-01-26

## 2025-01-27 LAB
ALBUMIN MFR SERPL ELPH: 50.7 %
ALBUMIN SERPL-MCNC: 4.2 G/DL
ALBUMIN/GLOB SERPL: 1 RATIO
ALPHA1 GLOB MFR SERPL ELPH: 4.2 %
ALPHA1 GLOB SERPL ELPH-MCNC: 0.3 G/DL
ALPHA2 GLOB MFR SERPL ELPH: 6.8 %
ALPHA2 GLOB SERPL ELPH-MCNC: 0.6 G/DL
B-GLOBULIN MFR SERPL ELPH: 14 %
B-GLOBULIN SERPL ELPH-MCNC: 1.1 G/DL
GAMMA GLOB FLD ELPH-MCNC: 2 G/DL
GAMMA GLOB MFR SERPL ELPH: 24.3 %
INTERPRETATION SERPL IEP-IMP: NORMAL
PROT SERPL-MCNC: 8.2 G/DL
PROT SERPL-MCNC: 8.2 G/DL

## 2025-02-13 ENCOUNTER — INPATIENT (INPATIENT)
Facility: HOSPITAL | Age: 80
LOS: 5 days | Discharge: HOME | DRG: 810 | End: 2025-02-19
Attending: COLON & RECTAL SURGERY | Admitting: COLON & RECTAL SURGERY
Payer: COMMERCIAL

## 2025-02-13 VITALS
OXYGEN SATURATION: 95 % | HEART RATE: 107 BPM | SYSTOLIC BLOOD PRESSURE: 126 MMHG | WEIGHT: 125 LBS | DIASTOLIC BLOOD PRESSURE: 70 MMHG | RESPIRATION RATE: 16 BRPM | TEMPERATURE: 100 F | HEIGHT: 62 IN

## 2025-02-13 LAB
ALBUMIN SERPL ELPH-MCNC: 3.4 G/DL — SIGNIFICANT CHANGE UP (ref 3.3–5)
ALP SERPL-CCNC: 343 U/L — HIGH (ref 40–120)
ALT FLD-CCNC: 88 U/L — HIGH (ref 10–45)
ANION GAP SERPL CALC-SCNC: 14 MMOL/L — SIGNIFICANT CHANGE UP (ref 5–17)
AST SERPL-CCNC: 97 U/L — HIGH (ref 10–40)
BASOPHILS # BLD AUTO: 0 K/UL — SIGNIFICANT CHANGE UP (ref 0–0.2)
BASOPHILS NFR BLD AUTO: 0 % — SIGNIFICANT CHANGE UP (ref 0–2)
BILIRUB SERPL-MCNC: 1 MG/DL — SIGNIFICANT CHANGE UP (ref 0.2–1.2)
BUN SERPL-MCNC: 18 MG/DL — SIGNIFICANT CHANGE UP (ref 7–23)
BURR CELLS BLD QL SMEAR: PRESENT — SIGNIFICANT CHANGE UP
CALCIUM SERPL-MCNC: 9.4 MG/DL — SIGNIFICANT CHANGE UP (ref 8.4–10.5)
CHLORIDE SERPL-SCNC: 92 MMOL/L — LOW (ref 96–108)
CO2 SERPL-SCNC: 18 MMOL/L — LOW (ref 22–31)
CREAT SERPL-MCNC: 1.09 MG/DL — SIGNIFICANT CHANGE UP (ref 0.5–1.3)
EGFR: 51 ML/MIN/1.73M2 — LOW
EOSINOPHIL # BLD AUTO: 0 K/UL — SIGNIFICANT CHANGE UP (ref 0–0.5)
EOSINOPHIL NFR BLD AUTO: 0 % — SIGNIFICANT CHANGE UP (ref 0–6)
FLUAV AG NPH QL: SIGNIFICANT CHANGE UP
FLUBV AG NPH QL: SIGNIFICANT CHANGE UP
GAS PNL BLDV: SIGNIFICANT CHANGE UP
GLUCOSE SERPL-MCNC: 130 MG/DL — HIGH (ref 70–99)
HCT VFR BLD CALC: 33.3 % — LOW (ref 34.5–45)
HGB BLD-MCNC: 11 G/DL — LOW (ref 11.5–15.5)
LYMPHOCYTES # BLD AUTO: 0.72 K/UL — LOW (ref 1–3.3)
LYMPHOCYTES # BLD AUTO: 43.4 % — SIGNIFICANT CHANGE UP (ref 13–44)
MANUAL SMEAR VERIFICATION: SIGNIFICANT CHANGE UP
MCHC RBC-ENTMCNC: 26.7 PG — LOW (ref 27–34)
MCHC RBC-ENTMCNC: 33 G/DL — SIGNIFICANT CHANGE UP (ref 32–36)
MCV RBC AUTO: 80.8 FL — SIGNIFICANT CHANGE UP (ref 80–100)
MONOCYTES # BLD AUTO: 0.93 K/UL — HIGH (ref 0–0.9)
MONOCYTES NFR BLD AUTO: 55.7 % — HIGH (ref 2–14)
NEUTROPHILS # BLD AUTO: 0.02 K/UL — LOW (ref 1.8–7.4)
NEUTROPHILS NFR BLD AUTO: 0.9 % — LOW (ref 43–77)
OVALOCYTES BLD QL SMEAR: SLIGHT — SIGNIFICANT CHANGE UP
PLAT MORPH BLD: NORMAL — SIGNIFICANT CHANGE UP
PLATELET # BLD AUTO: 159 K/UL — SIGNIFICANT CHANGE UP (ref 150–400)
POIKILOCYTOSIS BLD QL AUTO: SLIGHT — SIGNIFICANT CHANGE UP
POTASSIUM SERPL-MCNC: 3.9 MMOL/L — SIGNIFICANT CHANGE UP (ref 3.5–5.3)
POTASSIUM SERPL-SCNC: 3.9 MMOL/L — SIGNIFICANT CHANGE UP (ref 3.5–5.3)
PROT SERPL-MCNC: 7.6 G/DL — SIGNIFICANT CHANGE UP (ref 6–8.3)
RBC # BLD: 4.12 M/UL — SIGNIFICANT CHANGE UP (ref 3.8–5.2)
RBC # FLD: 14 % — SIGNIFICANT CHANGE UP (ref 10.3–14.5)
RBC BLD AUTO: ABNORMAL
RSV RNA NPH QL NAA+NON-PROBE: SIGNIFICANT CHANGE UP
SARS-COV-2 RNA SPEC QL NAA+PROBE: SIGNIFICANT CHANGE UP
SODIUM SERPL-SCNC: 124 MMOL/L — LOW (ref 135–145)
WBC # BLD: 1.67 K/UL — LOW (ref 3.8–10.5)
WBC # FLD AUTO: 1.67 K/UL — LOW (ref 3.8–10.5)

## 2025-02-13 PROCEDURE — 99285 EMERGENCY DEPT VISIT HI MDM: CPT

## 2025-02-13 PROCEDURE — 71045 X-RAY EXAM CHEST 1 VIEW: CPT | Mod: 26

## 2025-02-13 PROCEDURE — 74177 CT ABD & PELVIS W/CONTRAST: CPT | Mod: 26

## 2025-02-13 RX ORDER — PIPERACILLIN-TAZO-DEXTROSE,ISO 3.375G/5
3.38 IV SOLUTION, PIGGYBACK PREMIX FROZEN(ML) INTRAVENOUS ONCE
Refills: 0 | Status: COMPLETED | OUTPATIENT
Start: 2025-02-13 | End: 2025-02-13

## 2025-02-13 RX ORDER — VANCOMYCIN HCL IN 5 % DEXTROSE 1.5G/250ML
1000 PLASTIC BAG, INJECTION (ML) INTRAVENOUS ONCE
Refills: 0 | Status: COMPLETED | OUTPATIENT
Start: 2025-02-13 | End: 2025-02-13

## 2025-02-13 RX ORDER — ACETAMINOPHEN 500 MG/5ML
1000 LIQUID (ML) ORAL ONCE
Refills: 0 | Status: COMPLETED | OUTPATIENT
Start: 2025-02-13 | End: 2025-02-13

## 2025-02-13 RX ADMIN — Medication 1000 MILLILITER(S): at 22:59

## 2025-02-13 RX ADMIN — Medication 200 GRAM(S): at 22:59

## 2025-02-13 RX ADMIN — Medication 400 MILLIGRAM(S): at 21:42

## 2025-02-13 NOTE — ED PROVIDER NOTE - ADMIT DISPOSITION PRESENT ON ADMISSION SEPSIS Q1 - RE-EVALUATED PATIENT FLUID AND VITAL SIGNS
36.5
I have re-evaluated the patient's fluid status and reviewed vital signs. Clinical perfusion assessment was performed.

## 2025-02-13 NOTE — ED PROVIDER NOTE - PHYSICAL EXAMINATION
Gen: AAOx3, ill appearing  HEENT: NCAT. PEERLA, EOMI, oral mucosa moist, normal conjunctiva  Lung: CTAB, no respiratory distress, no wheezes/rhonchi/rales B/L, speaking in full sentences  CV: RRR, no murmurs, rubs or gallops  Abd: soft, NT, distended, no guarding, no CVA tenderness  MSK: no visible deformities  Neuro: No focal sensory or motor deficits  Skin: Warm, well perfused, no rash  Psych: normal affect.

## 2025-02-13 NOTE — ED ADULT NURSE NOTE - NS ED NURSE LEVEL OF CONSCIOUSNESS SPEECH
Relayed PCP's recommendations with mom. Scheduled pt for appt with PCP on 2023. Mom to follow recommendations.    Gertrudis Shea RN     Speaking Coherently

## 2025-02-13 NOTE — ED PROVIDER NOTE - RAPID ASSESSMENT
78yo F with PMH of recent leukemia diagnosis (did not start treatment yet), here with son, presenting with fever/chills since yesterday. +fatigue and generalized weakness. Last gave tylenol 2 hours ago. Denies cough,  abdominal pain, n/v/d, rash, sick contacts. No dysuria. Reports brown colored urine. Last BM 3 days ago.     Patient was rapidly assessed by me,  Fatou Wood PA-C, in ED triage. A limited history was obtained. The patient will be seen and further examined/worked up in the main ED and their care will be completed by the main ED team. Receiving team will follow up on labs, analgesia, any clinical imaging, and perform reassessment and disposition of the patient as clinically indicated. All decisions regarding the progression of care will be made at their discretion.

## 2025-02-13 NOTE — ED ADULT TRIAGE NOTE - SPO2 (%)
Advance Care Planning     General Advance Care Planning (ACP) Conversation    Date of Conversation: 2/2/2024  Conducted with: Patient with Decision Making Capacity    Healthcare Decision Maker:    Primary Decision Maker: Juan Smith - Child - [phone]    Secondary Decision Maker: Hafsa Tapia - Niece/Nephew - [phone]  Click here to complete Healthcare Decision Makers including selection of the Healthcare Decision Maker Relationship (ie \"Primary\").   Today we documented Decision Maker(s) consistent with Legal Next of Kin hierarchy.    Content/Action Overview:  DECLINED ACP Conversation - will revisit periodically  Reviewed DNR/DNI and patient elects Full Code (Attempt Resuscitation)        Length of Voluntary ACP Conversation in minutes:  <16 minutes (Non-Billable)    Kathy Garcia             
95

## 2025-02-13 NOTE — ED PROVIDER NOTE - PROGRESS NOTE DETAILS
DO Joycelyn: CT scan reveals evidence of sigmoid diverticulitis with concern for intramural abscess.  Surgery consulted, advised to admit to their service.

## 2025-02-13 NOTE — ED ADULT NURSE NOTE - OBJECTIVE STATEMENT
Pt is a 80 y/o female PMH leukemia dx (has not started treatment) presents to the ED c/o fever/chills. Pt is AxO x4, son is at bedside. Pt son states pt began feeling chills yesterday and generalized weakness. Pt son reports highest temperature to be 100.7. Pt was given tylenol @1600. Pt states she has some brown colored urine and last BM was approx 3 days ago. Pt denies chest pain, N/V/D, SOB, dysuria, rash, sensory loss or abdominal pain.

## 2025-02-13 NOTE — ED PROVIDER NOTE - CLINICAL SUMMARY MEDICAL DECISION MAKING FREE TEXT BOX
79F, PMHx leukemia not on treatment, presents to ED for fever.  Patient is primarily Ivorian-speaking, son at bedside interprets for history and physical exam.  Per son, patient has had fever since yesterday, with associated chills and fatigue, as well as generalized weakness.  Tmax at home 107.  Patient is scheduled to follow-up with [facility] next week for possible initiation of treatment.  Endorses constipation, however denies shortness of breath, chest pain, abdominal pain, N/V/D, urinary symptoms, trauma.    Vitals:  BP 97/60  Resp 16  HR 73  Temp 98.3  SpO2 96% room air    Workup initiated by MENDY NUR in triage.  Patient noted to be in neutropenic fever, mild anemia.  Hyponatremic to 124.  New transaminitis when compared to January 14.  VBG with lactate of 1.2, no acidosis.  UA without evidence of infection.  Will acquire abdominal CT to assess for etiology of constipation, as well as to assess for possible intra-abdominal etiology of fever in the setting of abdominal distention.  Broad-spectrum antibiotics.  Likely admit. 79F, PMHx leukemia not on treatment, presents to ED for fever.  Patient is primarily Faroese-speaking, son at bedside interprets for history and physical exam.  Per son, patient has had fever since yesterday, with associated chills and fatigue, as well as generalized weakness.  Tmax at home 107.  Patient is scheduled to follow-up with [facility] next week for possible initiation of treatment.  Endorses constipation, however denies shortness of breath, chest pain, abdominal pain, N/V/D, urinary symptoms, trauma.    Vitals:  BP 97/60  Resp 16  HR 73  Temp 98.3  SpO2 96% room air    Workup initiated by MENDY NUR in triage.  Patient noted to be in neutropenic fever, mild anemia.  Hyponatremic to 124.  New transaminitis when compared to January 14.  VBG with lactate of 1.2, no acidosis.  UA without evidence of infection.  Will acquire abdominal CT to assess for etiology of constipation, as well as to assess for possible intra-abdominal etiology of fever in the setting of abdominal distention.  Broad-spectrum antibiotics.  Likely admit.    GUILLERMO Campos MD: Agree with resident/ACP MDM, assessment and plan as above.

## 2025-02-13 NOTE — ED PROVIDER NOTE - DIFFERENTIAL DIAGNOSIS
Ddx includes, however, is not limited to: neutropenic fever, uti, pna, viral syndrome, bacteremia, other Differential Diagnosis

## 2025-02-14 DIAGNOSIS — D70.9 NEUTROPENIA, UNSPECIFIED: ICD-10-CM

## 2025-02-14 LAB
ADD ON TEST-SPECIMEN IN LAB: SIGNIFICANT CHANGE UP
ADD ON TEST-SPECIMEN IN LAB: SIGNIFICANT CHANGE UP
ALBUMIN SERPL ELPH-MCNC: 2.4 G/DL — LOW (ref 3.3–5)
ALP SERPL-CCNC: 238 U/L — HIGH (ref 40–120)
ALT FLD-CCNC: 62 U/L — HIGH (ref 10–45)
ANION GAP SERPL CALC-SCNC: 12 MMOL/L — SIGNIFICANT CHANGE UP (ref 5–17)
ANION GAP SERPL CALC-SCNC: 12 MMOL/L — SIGNIFICANT CHANGE UP (ref 5–17)
APPEARANCE UR: CLEAR — SIGNIFICANT CHANGE UP
APPEARANCE UR: CLEAR — SIGNIFICANT CHANGE UP
AST SERPL-CCNC: 82 U/L — HIGH (ref 10–40)
BACTERIA # UR AUTO: NEGATIVE /HPF — SIGNIFICANT CHANGE UP
BACTERIA # UR AUTO: NEGATIVE /HPF — SIGNIFICANT CHANGE UP
BILIRUB SERPL-MCNC: 0.7 MG/DL — SIGNIFICANT CHANGE UP (ref 0.2–1.2)
BILIRUB UR-MCNC: NEGATIVE — SIGNIFICANT CHANGE UP
BILIRUB UR-MCNC: NEGATIVE — SIGNIFICANT CHANGE UP
BUN SERPL-MCNC: 11 MG/DL — SIGNIFICANT CHANGE UP (ref 7–23)
BUN SERPL-MCNC: 14 MG/DL — SIGNIFICANT CHANGE UP (ref 7–23)
CALCIUM SERPL-MCNC: 7.6 MG/DL — LOW (ref 8.4–10.5)
CALCIUM SERPL-MCNC: 8.9 MG/DL — SIGNIFICANT CHANGE UP (ref 8.4–10.5)
CAST: 1 /LPF — SIGNIFICANT CHANGE UP (ref 0–4)
CAST: 4 /LPF — SIGNIFICANT CHANGE UP (ref 0–4)
CHLORIDE SERPL-SCNC: 94 MMOL/L — LOW (ref 96–108)
CHLORIDE SERPL-SCNC: 98 MMOL/L — SIGNIFICANT CHANGE UP (ref 96–108)
CO2 SERPL-SCNC: 16 MMOL/L — LOW (ref 22–31)
CO2 SERPL-SCNC: 19 MMOL/L — LOW (ref 22–31)
COLOR SPEC: YELLOW — SIGNIFICANT CHANGE UP
COLOR SPEC: YELLOW — SIGNIFICANT CHANGE UP
CREAT ?TM UR-MCNC: 33 MG/DL — SIGNIFICANT CHANGE UP
CREAT SERPL-MCNC: 0.92 MG/DL — SIGNIFICANT CHANGE UP (ref 0.5–1.3)
CREAT SERPL-MCNC: 0.98 MG/DL — SIGNIFICANT CHANGE UP (ref 0.5–1.3)
CULTURE RESULTS: SIGNIFICANT CHANGE UP
DIFF PNL FLD: ABNORMAL
DIFF PNL FLD: ABNORMAL
EGFR: 58 ML/MIN/1.73M2 — LOW
EGFR: 63 ML/MIN/1.73M2 — SIGNIFICANT CHANGE UP
GLUCOSE SERPL-MCNC: 285 MG/DL — HIGH (ref 70–99)
GLUCOSE SERPL-MCNC: 95 MG/DL — SIGNIFICANT CHANGE UP (ref 70–99)
GLUCOSE UR QL: NEGATIVE MG/DL — SIGNIFICANT CHANGE UP
GLUCOSE UR QL: NEGATIVE MG/DL — SIGNIFICANT CHANGE UP
KETONES UR-MCNC: ABNORMAL MG/DL
KETONES UR-MCNC: ABNORMAL MG/DL
LEUKOCYTE ESTERASE UR-ACNC: NEGATIVE — SIGNIFICANT CHANGE UP
LEUKOCYTE ESTERASE UR-ACNC: NEGATIVE — SIGNIFICANT CHANGE UP
MAGNESIUM SERPL-MCNC: 1.9 MG/DL — SIGNIFICANT CHANGE UP (ref 1.6–2.6)
NITRITE UR-MCNC: NEGATIVE — SIGNIFICANT CHANGE UP
NITRITE UR-MCNC: NEGATIVE — SIGNIFICANT CHANGE UP
OSMOLALITY UR: 388 MOS/KG — SIGNIFICANT CHANGE UP (ref 300–900)
PH UR: 6 — SIGNIFICANT CHANGE UP (ref 5–8)
PH UR: 6 — SIGNIFICANT CHANGE UP (ref 5–8)
PHOSPHATE SERPL-MCNC: 1.7 MG/DL — LOW (ref 2.5–4.5)
POTASSIUM SERPL-MCNC: 3.4 MMOL/L — LOW (ref 3.5–5.3)
POTASSIUM SERPL-MCNC: 4.3 MMOL/L — SIGNIFICANT CHANGE UP (ref 3.5–5.3)
POTASSIUM SERPL-SCNC: 3.4 MMOL/L — LOW (ref 3.5–5.3)
POTASSIUM SERPL-SCNC: 4.3 MMOL/L — SIGNIFICANT CHANGE UP (ref 3.5–5.3)
POTASSIUM UR-SCNC: 27 MMOL/L — SIGNIFICANT CHANGE UP
PROT ?TM UR-MCNC: 28 MG/DL — HIGH (ref 0–12)
PROT SERPL-MCNC: 5.7 G/DL — LOW (ref 6–8.3)
PROT UR-MCNC: 100 MG/DL
PROT UR-MCNC: 30 MG/DL
PROT/CREAT UR-RTO: 0.8 RATIO — HIGH (ref 0–0.2)
RBC CASTS # UR COMP ASSIST: 6 /HPF — HIGH (ref 0–4)
RBC CASTS # UR COMP ASSIST: 8 /HPF — HIGH (ref 0–4)
REVIEW: SIGNIFICANT CHANGE UP
SODIUM SERPL-SCNC: 122 MMOL/L — LOW (ref 135–145)
SODIUM SERPL-SCNC: 129 MMOL/L — LOW (ref 135–145)
SODIUM UR-SCNC: 80 MMOL/L — SIGNIFICANT CHANGE UP
SP GR SPEC: 1.02 — SIGNIFICANT CHANGE UP (ref 1–1.03)
SP GR SPEC: >1.03 — HIGH (ref 1–1.03)
SPECIMEN SOURCE: SIGNIFICANT CHANGE UP
SQUAMOUS # UR AUTO: 2 /HPF — SIGNIFICANT CHANGE UP (ref 0–5)
SQUAMOUS # UR AUTO: 5 /HPF — SIGNIFICANT CHANGE UP (ref 0–5)
UROBILINOGEN FLD QL: 1 MG/DL — SIGNIFICANT CHANGE UP (ref 0.2–1)
UROBILINOGEN FLD QL: 1 MG/DL — SIGNIFICANT CHANGE UP (ref 0.2–1)
WBC UR QL: 0 /HPF — SIGNIFICANT CHANGE UP (ref 0–5)
WBC UR QL: 1 /HPF — SIGNIFICANT CHANGE UP (ref 0–5)

## 2025-02-14 PROCEDURE — 99222 1ST HOSP IP/OBS MODERATE 55: CPT | Mod: GC

## 2025-02-14 RX ORDER — ACETAMINOPHEN 500 MG/5ML
975 LIQUID (ML) ORAL EVERY 6 HOURS
Refills: 0 | Status: DISCONTINUED | OUTPATIENT
Start: 2025-02-14 | End: 2025-02-19

## 2025-02-14 RX ORDER — PIPERACILLIN-TAZO-DEXTROSE,ISO 3.375G/5
3.38 IV SOLUTION, PIGGYBACK PREMIX FROZEN(ML) INTRAVENOUS EVERY 8 HOURS
Refills: 0 | Status: DISCONTINUED | OUTPATIENT
Start: 2025-02-14 | End: 2025-02-14

## 2025-02-14 RX ORDER — PIPERACILLIN-TAZO-DEXTROSE,ISO 3.375G/5
3.38 IV SOLUTION, PIGGYBACK PREMIX FROZEN(ML) INTRAVENOUS EVERY 8 HOURS
Refills: 0 | Status: DISCONTINUED | OUTPATIENT
Start: 2025-02-14 | End: 2025-02-19

## 2025-02-14 RX ORDER — AMLODIPINE BESYLATE 10 MG/1
5 TABLET ORAL DAILY
Refills: 0 | Status: DISCONTINUED | OUTPATIENT
Start: 2025-02-14 | End: 2025-02-19

## 2025-02-14 RX ORDER — ENOXAPARIN SODIUM 100 MG/ML
40 INJECTION SUBCUTANEOUS EVERY 24 HOURS
Refills: 0 | Status: DISCONTINUED | OUTPATIENT
Start: 2025-02-14 | End: 2025-02-19

## 2025-02-14 RX ORDER — POTASSIUM CHLORIDE, DEXTROSE MONOHYDRATE AND SODIUM CHLORIDE 150; 5; 900 MG/100ML; G/100ML; MG/100ML
1000 INJECTION, SOLUTION INTRAVENOUS
Refills: 0 | Status: DISCONTINUED | OUTPATIENT
Start: 2025-02-14 | End: 2025-02-14

## 2025-02-14 RX ORDER — HYDROMORPHONE/SOD CHLOR,ISO/PF 2 MG/10 ML
0.5 SYRINGE (ML) INJECTION EVERY 4 HOURS
Refills: 0 | Status: DISCONTINUED | OUTPATIENT
Start: 2025-02-14 | End: 2025-02-19

## 2025-02-14 RX ORDER — HYDROMORPHONE/SOD CHLOR,ISO/PF 2 MG/10 ML
0.2 SYRINGE (ML) INJECTION EVERY 4 HOURS
Refills: 0 | Status: DISCONTINUED | OUTPATIENT
Start: 2025-02-14 | End: 2025-02-19

## 2025-02-14 RX ORDER — FILGRASTIM 300 UG/.5ML
300 INJECTION, SOLUTION INTRAVENOUS; SUBCUTANEOUS DAILY
Refills: 0 | Status: COMPLETED | OUTPATIENT
Start: 2025-02-14 | End: 2025-02-17

## 2025-02-14 RX ORDER — ONDANSETRON HCL/PF 4 MG/2 ML
4 VIAL (ML) INJECTION EVERY 8 HOURS
Refills: 0 | Status: DISCONTINUED | OUTPATIENT
Start: 2025-02-14 | End: 2025-02-19

## 2025-02-14 RX ORDER — SODIUM PHOSPHATE,DIBASIC DIHYD
30 POWDER (GRAM) MISCELLANEOUS ONCE
Refills: 0 | Status: COMPLETED | OUTPATIENT
Start: 2025-02-14 | End: 2025-02-14

## 2025-02-14 RX ORDER — INFLUENZA A VIRUS A/IDAHO/07/2018 (H1N1) ANTIGEN (MDCK CELL DERIVED, PROPIOLACTONE INACTIVATED, INFLUENZA A VIRUS A/INDIANA/08/2018 (H3N2) ANTIGEN (MDCK CELL DERIVED, PROPIOLACTONE INACTIVATED), INFLUENZA B VIRUS B/SINGAPORE/INFTT-16-0610/2016 ANTIGEN (MDCK CELL DERIVED, PROPIOLACTONE INACTIVATED), INFLUENZA B VIRUS B/IOWA/06/2017 ANTIGEN (MDCK CELL DERIVED, PROPIOLACTONE INACTIVATED) 15; 15; 15; 15 UG/.5ML; UG/.5ML; UG/.5ML; UG/.5ML
0.5 INJECTION, SUSPENSION INTRAMUSCULAR ONCE
Refills: 0 | Status: DISCONTINUED | OUTPATIENT
Start: 2025-02-14 | End: 2025-02-19

## 2025-02-14 RX ADMIN — Medication 85 MILLIMOLE(S): at 18:35

## 2025-02-14 RX ADMIN — Medication 25 GRAM(S): at 05:32

## 2025-02-14 RX ADMIN — AMLODIPINE BESYLATE 5 MILLIGRAM(S): 10 TABLET ORAL at 05:32

## 2025-02-14 RX ADMIN — Medication 40 MILLIEQUIVALENT(S): at 14:43

## 2025-02-14 RX ADMIN — Medication 100 MILLIEQUIVALENT(S): at 14:42

## 2025-02-14 RX ADMIN — Medication 100 MILLILITER(S): at 05:32

## 2025-02-14 RX ADMIN — Medication 100 MILLILITER(S): at 14:42

## 2025-02-14 RX ADMIN — Medication 100 MILLIEQUIVALENT(S): at 17:08

## 2025-02-14 RX ADMIN — Medication 25 GRAM(S): at 14:42

## 2025-02-14 RX ADMIN — Medication 25 GRAM(S): at 21:29

## 2025-02-14 RX ADMIN — Medication 100 MILLIEQUIVALENT(S): at 16:13

## 2025-02-14 RX ADMIN — Medication 250 MILLIGRAM(S): at 00:43

## 2025-02-14 RX ADMIN — Medication 1000 MILLILITER(S): at 02:05

## 2025-02-14 NOTE — CONSULT NOTE ADULT - SUBJECTIVE AND OBJECTIVE BOX
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 Last admission 1/2025 with hip pain, found with severe neutropenia, hyponatremia, and positive for Enterovirus. 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 Patient planned to follow up with [facility] Dr Morales, however has not done yet. She now presents with fever and constipation. Last BM 2 days ago was her baseline soft brown nonbloody, however since she has had the urge to defecate but has not had any BM. She began having intermittent fevers and was brought in by her son for evaluation. In ED, febrile to Tmax 103, leukopenic, transaminitis, and compensated metabolic acidosis. CXR clear. Flu and COVID negative. CT AP with acute sigmoid diverticulitis with intramural ill-defined fluid collection, likely developing intramural abscess. Denied abdominal pain, CP, SOB, N/V/D. No PSH, has never had a colonoscopy. Son at bedside translated and supplemented history. Patient admitted to surgery and internal medicine consulted.     SUBJECTIVE/ ROS:  - Patient seen and examined by bedside     OBJECTIVE:  ICU Vital Signs Last 24 Hrs  T(C): 37.2 (14 Feb 2025 13:40), Max: 39.4 (13 Feb 2025 22:09)  T(F): 98.9 (14 Feb 2025 13:40), Max: 103 (13 Feb 2025 22:09)  HR: 81 (14 Feb 2025 13:40) (73 - 110)  BP: 134/76 (14 Feb 2025 13:40) (97/60 - 156/69)  BP(mean): 72 (14 Feb 2025 00:44) (72 - 72)  ABP: --  ABP(mean): --  RR: 18 (14 Feb 2025 13:40) (16 - 18)  SpO2: 98% (14 Feb 2025 13:40) (95% - 98%)    O2 Parameters below as of 14 Feb 2025 13:40  Patient On (Oxygen Delivery Method): room air          02-14-25 @ 14:18  T(C): 37.2 (02-14-25 @ 13:40), Max: 39.4 (02-13-25 @ 22:09)  HR: 81 (02-14-25 @ 13:40) (73 - 110)  BP: 134/76 (02-14-25 @ 13:40) (97/60 - 156/69)  RR: 18 (02-14-25 @ 13:40) (16 - 18)  SpO2: 98% (02-14-25 @ 13:40) (95% - 98%)  Wt(kg): --  CAPILLARY BLOOD GLUCOSE          HOSPITAL MEDICATIONS:  MEDICATIONS  (STANDING):  amLODIPine   Tablet 5 milliGRAM(s) Oral daily  enoxaparin Injectable 40 milliGRAM(s) SubCutaneous every 24 hours  influenza  Vaccine (HIGH DOSE) 0.5 milliLiter(s) IntraMuscular once  piperacillin/tazobactam IVPB.. 3.375 Gram(s) IV Intermittent every 8 hours  potassium chloride    Tablet ER 40 milliEquivalent(s) Oral once  potassium chloride  10 mEq/100 mL IVPB 10 milliEquivalent(s) IV Intermittent every 1 hour  sodium chloride 0.9%. 1000 milliLiter(s) (100 mL/Hr) IV Continuous <Continuous>  sodium phosphate 30 milliMole(s)/500 mL IVPB 30 milliMole(s) IV Intermittent once    MEDICATIONS  (PRN):  acetaminophen     Tablet .. 975 milliGRAM(s) Oral every 6 hours PRN Temp greater or equal to 38C (100.4F), Mild Pain (1 - 3)  HYDROmorphone  Injectable 0.2 milliGRAM(s) IV Push every 4 hours PRN Moderate Pain (4 - 6)  HYDROmorphone  Injectable 0.5 milliGRAM(s) IV Push every 4 hours PRN Severe Pain (7 - 10)  ondansetron Injectable 4 milliGRAM(s) IV Push every 8 hours PRN Nausea and/or Vomiting      PHYSICAL EXAMINATION:  General: NAD   Cardiology: S1/S2 with no murmur   Respiratory: CTA BL with no wheeze   GI: Soft and NTND  Extremities: SIM of BL UE/LE   Neurology: Awake with no acute neurological deficits     LABS:                        11.0   1.67  )-----------( 159      ( 13 Feb 2025 19:12 )             33.3     02-14    129[L]  |  98  |  11  ----------------------------<  95  3.4[L]   |  19[L]  |  0.98    Ca    8.9      14 Feb 2025 12:14  Phos  1.7     02-14  Mg     1.9     02-14    TPro  5.7[L]  /  Alb  2.4[L]  /  TBili  0.7  /  DBili  x   /  AST  82[H]  /  ALT  62[H]  /  AlkPhos  238[H]  02-14          MICROBIOLOGY:     RADIOLOGY:    CARDIOLOGY:             NAME OF PATIENT: PAVEL LEYVA  MRN: [id number]  DATE OF VISIT: 02-14-25 @ 14:18    HOSPITAL COURSE   78 YO F with PMHx of HTN, HLD, RA limited to feet on NSAIDs, and 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 Last admission 1/2025 with hip pain, found with severe neutropenia, hyponatremia, and positive for Enterovirus. 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 Patient planned to follow up with [facility] Dr Morales, however has not done yet. She now presents with fever and constipation. Last BM 2 days ago was her baseline soft brown nonbloody, however since she has had the urge to defecate but has not had any BM. She began having intermittent fevers and was brought in by her son for evaluation. In ED, febrile to Tmax 103, leukopenic, transaminitis, and compensated metabolic acidosis. CXR clear. Flu and COVID negative. CT AP with acute sigmoid diverticulitis with intramural ill-defined fluid collection, likely developing intramural abscess. Denied abdominal pain, CP, SOB, N/V/D. No PSH, has never had a colonoscopy. Son at bedside translated and supplemented history. Patient admitted to surgery and internal medicine consulted.     SUBJECTIVE/ ROS:  - Patient seen and examined by bedside     OBJECTIVE:  ICU Vital Signs Last 24 Hrs  T(C): 37.2 (14 Feb 2025 13:40), Max: 39.4 (13 Feb 2025 22:09)  T(F): 98.9 (14 Feb 2025 13:40), Max: 103 (13 Feb 2025 22:09)  HR: 81 (14 Feb 2025 13:40) (73 - 110)  BP: 134/76 (14 Feb 2025 13:40) (97/60 - 156/69)  BP(mean): 72 (14 Feb 2025 00:44) (72 - 72)  ABP: --  ABP(mean): --  RR: 18 (14 Feb 2025 13:40) (16 - 18)  SpO2: 98% (14 Feb 2025 13:40) (95% - 98%)    O2 Parameters below as of 14 Feb 2025 13:40  Patient On (Oxygen Delivery Method): room air          02-14-25 @ 14:18  T(C): 37.2 (02-14-25 @ 13:40), Max: 39.4 (02-13-25 @ 22:09)  HR: 81 (02-14-25 @ 13:40) (73 - 110)  BP: 134/76 (02-14-25 @ 13:40) (97/60 - 156/69)  RR: 18 (02-14-25 @ 13:40) (16 - 18)  SpO2: 98% (02-14-25 @ 13:40) (95% - 98%)  Wt(kg): --  CAPILLARY BLOOD GLUCOSE          HOSPITAL MEDICATIONS:  MEDICATIONS  (STANDING):  amLODIPine   Tablet 5 milliGRAM(s) Oral daily  enoxaparin Injectable 40 milliGRAM(s) SubCutaneous every 24 hours  influenza  Vaccine (HIGH DOSE) 0.5 milliLiter(s) IntraMuscular once  piperacillin/tazobactam IVPB.. 3.375 Gram(s) IV Intermittent every 8 hours  potassium chloride    Tablet ER 40 milliEquivalent(s) Oral once  potassium chloride  10 mEq/100 mL IVPB 10 milliEquivalent(s) IV Intermittent every 1 hour  sodium chloride 0.9%. 1000 milliLiter(s) (100 mL/Hr) IV Continuous <Continuous>  sodium phosphate 30 milliMole(s)/500 mL IVPB 30 milliMole(s) IV Intermittent once    MEDICATIONS  (PRN):  acetaminophen     Tablet .. 975 milliGRAM(s) Oral every 6 hours PRN Temp greater or equal to 38C (100.4F), Mild Pain (1 - 3)  HYDROmorphone  Injectable 0.2 milliGRAM(s) IV Push every 4 hours PRN Moderate Pain (4 - 6)  HYDROmorphone  Injectable 0.5 milliGRAM(s) IV Push every 4 hours PRN Severe Pain (7 - 10)  ondansetron Injectable 4 milliGRAM(s) IV Push every 8 hours PRN Nausea and/or Vomiting    Home Medications:  calamine topical lotion: 1 Apply topically to affected area 3 times a day (14 Jan 2025 14:51)    PAST MEDICAL & SURGICAL HISTORY:  HLD (hyperlipidemia)      Leukemia      No significant past surgical history            PHYSICAL EXAMINATION:  General: NAD   Cardiology: S1/S2 with no murmur   Respiratory: CTA BL with no wheeze   GI: Soft and NTND  Extremities: SIM of BL UE/LE   Neurology: Awake with no acute neurological deficits     LABS:                        11.0   1.67  )-----------( 159      ( 13 Feb 2025 19:12 )             33.3     02-14    129[L]  |  98  |  11  ----------------------------<  95  3.4[L]   |  19[L]  |  0.98    Ca    8.9      14 Feb 2025 12:14  Phos  1.7     02-14  Mg     1.9     02-14    TPro  5.7[L]  /  Alb  2.4[L]  /  TBili  0.7  /  DBili  x   /  AST  82[H]  /  ALT  62[H]  /  AlkPhos  238[H]  02-14          MICROBIOLOGY:     RADIOLOGY:    CARDIOLOGY:

## 2025-02-14 NOTE — PATIENT PROFILE ADULT - FALL HARM RISK - HARM RISK INTERVENTIONS

## 2025-02-14 NOTE — CONSULT NOTE ADULT - ASSESSMENT
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 Nephrology consulted for hyponatremia.    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 Treated with HTS and fluid restriction. Uosm 442 at that time.  - On this admission, pt with corrected Na of ~125. Given 1L NS bolus on 2/13 and 2/14 and on maintenance NS @ 100cc/h. SNa increased to 129 today. K 3.4, phos 1.7. Normal renal function. 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  - Replete K and phos, which will help increase serum sodium  - Avoid hypotonic infusions. If Zosyn can be mixed in NS instead of D5W, please ask pharmacy to switch.  - Restrict PO fluid intake  - Continue NS for now. Awaiting urine studies. Repeat BMP this evening.      Franck Canas, PGY-5  Nephrology Fellow  MS TEAMS preferred  (After 5pm or on weekends, please contact the fellow on call).

## 2025-02-14 NOTE — H&P ADULT - HISTORY OF PRESENT ILLNESS
79F with history of HTN, RA limited to feet on NSAIDs, recently diagnosed leukemia (CD8+ T-large granular lymphoproliferative disorder), not yet on treatment presenting for 2 days of fevers and constipation. Last BM 2 days ago was her baseline soft brown nonbloody; since then she has had the urge to defecate but has not had any BM. She began having intermittent fevers and was brought in by her son for evaluation. In ED febrile to Tmax 103 and leukopenic. Denies abdominal pain, CP, SOB, N/V/D. No PSH, has never had a colonoscopy. Son at bedside translated and supplemented history.

## 2025-02-14 NOTE — H&P ADULT - NSHPLABSRESULTS_GEN_ALL_CORE
11.0   1.67  )-----------( 159      ( 13 Feb 2025 19:12 )             33.3     02-13    124[L]  |  92[L]  |  18  ----------------------------<  130[H]  3.9   |  18[L]  |  1.09    Ca    9.4      13 Feb 2025 19:12    TPro  7.6  /  Alb  3.4  /  TBili  1.0  /  DBili  x   /  AST  97[H]  /  ALT  88[H]  /  AlkPhos  343[H]  02-13    LIVER FUNCTIONS - ( 13 Feb 2025 19:12 )  Alb: 3.4 g/dL / Pro: 7.6 g/dL / ALK PHOS: 343 U/L / ALT: 88 U/L / AST: 97 U/L / GGT: x             Urinalysis Basic - ( 14 Feb 2025 00:34 )    Color: Yellow / Appearance: Clear / SG: >1.030 / pH: x  Gluc: x / Ketone: Trace mg/dL  / Bili: Negative / Urobili: 1.0 mg/dL   Blood: x / Protein: 100 mg/dL / Nitrite: Negative   Leuk Esterase: Negative / RBC: 8 /HPF / WBC 1 /HPF   Sq Epi: x / Non Sq Epi: 5 /HPF / Bacteria: Negative /HPF    < from: CT Abdomen and Pelvis w/ IV Cont (02.13.25 @ 22:06) >    IMPRESSION:  Acute sigmoid diverticulitis with intramural ill-defined fluid   collection, likely developing intramural abscess.    --- End of Report ---    < end of copied text >

## 2025-02-14 NOTE — CONSULT NOTE ADULT - ASSESSMENT
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 She now presents with fever and constipation. Found with CT AP with acute sigmoid diverticulitis with intramural ill-defined fluid collection, likely developing intramural abscess. Patient admitted to surgery and internal medicine consulted.     # Acute sigmoid diverticulitis with abscess   - Constipation and fevers at home   - CT AP with acute sigmoid diverticulitis with intramural ill-defined fluid collection, likely developing intramural abscess.  - Neutropenia on admission and immunosuppressed   - CXR, flu/COVID and UA negative   - UCx and BCx pending   - Continue on zosyn for now   - Keep NPO and on IVF  - Pain control with tylenol vs dilaudid  - Care per surgery     # Hyponatremia  - Na 124 on admission, worsened to 122, and s/p NS in ER with improvement   - Likely second to dehydration with fevers?   - Check urine lytes  - Continue on NS as NPO   - Monitor BMP BID   - Avoid over correction of > 6-8mmol/L in 24 hours     # Transaminitis   - ALKPHOS, AST and ALT elevated on admission and trending down   - CT AP with liver showing few granulomas, but bile duct and GB normal   - Hepatitis panel last admission negative   - Likely from  sepsis with cholestasis?  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   - Patient planned to follow up with [facility] Dr Morales, however has not done yet.   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 She now presents with fever and constipation. Found with CT AP with acute sigmoid diverticulitis with intramural ill-defined fluid collection, likely developing intramural abscess. Patient admitted to surgery and internal medicine consulted.     # Acute sigmoid diverticulitis with abscess   - Constipation and fevers at home   - CT AP with acute sigmoid diverticulitis with intramural ill-defined fluid collection, likely developing intramural abscess.  - Neutropenia on admission and immunosuppressed   - CXR, flu/COVID and UA negative   - UCx and BCx pending   - Continue on zosyn for now   - Keep NPO and on IVF  - Pain control with tylenol vs dilaudid  - Care per surgery     # Hyponatremia  - Na 124 on admission, worsened to 122, and s/p NS in ER with improvement   - Likely second to dehydration with fevers?   - Check urine lytes  - Continue on NS as NPO   - Monitor BMP BID   - Avoid over correction of > 6-8mmol/L in 24 hours     # Compensated metabolic acidosis   - HCO3 19 on gas and VBG with 7.40/28/17 on admission   - Lactate normal   - No YASMEEN noted   - Could be second to sepsis vs starvation acidosis?   - Trend VBG with IVF rehydration     # Transaminitis   - ALKPHOS, AST and ALT elevated on admission and trending down   - CT AP with liver showing few granulomas, but bile duct and GB normal   - Hepatitis panel last admission negative   - Likely from  sepsis with cholestasis?  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   - Patient planned to follow up with [facility] Dr Morales, however has not done yet.   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 She now presents with fever and constipation. Found with CT AP with acute sigmoid diverticulitis with intramural ill-defined fluid collection, likely developing intramural abscess. Patient admitted to surgery and internal medicine consulted.     # Acute sigmoid diverticulitis with abscess   - Constipation and fevers at home   - CT AP with acute sigmoid diverticulitis with intramural ill-defined fluid collection, likely developing intramural abscess.  - Neutropenia on admission and immunosuppressed   - CXR, flu/COVID and UA negative   - UCx and BCx pending   - Continue on zosyn for now   - Keep NPO and on IVF  - Pain control with tylenol vs dilaudid  - IF surgical procedure needed give age and comorbidities will need ECHO for medical clearance   - Care per surgery     # Hyponatremia  - Na 124 on admission, worsened to 122, and s/p NS in ER with improvement   - Likely second to dehydration with fevers?   - Check urine lytes  - Continue on NS as NPO   - Monitor BMP BID   - Avoid over correction of > 6-8mmol/L in 24 hours     # Compensated metabolic acidosis   - HCO3 19 on gas and VBG with 7.40/28/17 on admission   - Lactate normal   - No YASMEEN noted   - Could be second to sepsis vs starvation acidosis?   - Trend VBG with IVF rehydration     # Transaminitis   - ALKPHOS, AST and ALT elevated on admission and trending down   - CT AP with liver showing few granulomas, but bile duct and GB normal   - Hepatitis panel last admission negative   - Likely from  sepsis with cholestasis?  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   - Patient planned to follow up with [facility] Dr Morales, however has not done yet.   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 She now presents with fever and constipation. Found with CT AP with acute sigmoid diverticulitis with intramural ill-defined fluid collection, likely developing intramural abscess. Patient admitted to surgery and internal medicine consulted.     # Acute sigmoid diverticulitis with abscess   - Constipation and fevers at home   - CT AP with acute sigmoid diverticulitis with intramural ill-defined fluid collection, likely developing intramural abscess.  - Neutropenia on admission   - Last BM reported 2 days prior to admission   - BM noted on admission without blood and brown   - CXR, flu/COVID and UA negative   - UCx and BCx pending   - Continue on zosyn for now   - Keep NPO and on IVF  - Pain control with tylenol vs dilaudid  - IF surgical procedure needed give age and comorbidities will need ECHO for medical clearance   - Care per surgery     # Hyponatremia  - Na 124 on admission, worsened to 122, and s/p NS in ER with improvement   - Likely second to dehydration with fevers?   - Check urine lytes  - Continue on NS as NPO   - Monitor BMP BID   - Avoid over correction of > 6-8mmol/L in 24 hours   - Recommend renal consult     # Compensated metabolic acidosis   - HCO3 19 on gas and VBG with 7.40/28/17 on admission   - Lactate normal   - No YASMEEN noted   - Could be second to sepsis vs starvation acidosis?   - Trend VBG with IVF rehydration     # Transaminitis   - ALKPHOS, AST and ALT elevated on admission and trending down   - CT AP with liver showing few granulomas, but bile duct and GB normal   - Hepatitis panel last admission negative   - Likely from  sepsis with cholestasis?  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   - Patient planned to follow up with [facility] Dr Morales, however has not done yet.   - Given acute infection with neutropenic recc hemeONC consult   - Trend CBC   - Check ANC    # HTN and HLD   - Continue on norvasc 5   - Monitor BP     # RA   - Seen by Rheum last admission   - Positive RF and CCP  - Positive serologies but lacks joint symptoms  - Supportive care     # DVT PPX with LVX    Case discussed with attending

## 2025-02-14 NOTE — CONSULT NOTE ADULT - SUBJECTIVE AND OBJECTIVE BOX
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 Nephrology consulted for hyponatremia.    Last admission 1/2025 with hip pain, found with severe neutropenia, hyponatremia, and positive for Enterovirus. 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 Patient planned to follow up with [facility] Dr Morales, however has not done yet. She now presents with fever and constipation. Last BM 2 days ago was her baseline soft brown nonbloody, however since she has had the urge to defecate but has not had any BM. She began having intermittent fevers and was brought in by her son for evaluation. In ED, febrile to Tmax 103, leukopenic, transaminitis, and compensated metabolic acidosis. CXR clear. Flu and COVID negative. CT AP with acute sigmoid diverticulitis with intramural ill-defined fluid collection, likely developing intramural abscess. Denied abdominal pain, CP, SOB, N/V/D. No PSH, has never had a colonoscopy. Son at bedside translated and supplemented history.     On admission, pt with corrected Na of ~125. Given 1L NS bolus on 2/13 and 2/14 and on maintenance NS @ 100cc/h. SNa increased to 129 today. K 3.4, phos 1.7. Normal renal function. Urine studies pending. 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      PAST HISTORY  --------------------------------------------------------------------------------  PAST MEDICAL & SURGICAL HISTORY:  HLD (hyperlipidemia)  Leukemia      FAMILY HISTORY:    PAST SOCIAL HISTORY:    ALLERGIES & MEDICATIONS  --------------------------------------------------------------------------------  Allergies    No Known Allergies    Standing Inpatient Medications  amLODIPine   Tablet 5 milliGRAM(s) Oral daily  enoxaparin Injectable 40 milliGRAM(s) SubCutaneous every 24 hours  influenza  Vaccine (HIGH DOSE) 0.5 milliLiter(s) IntraMuscular once  piperacillin/tazobactam IVPB.. 3.375 Gram(s) IV Intermittent every 8 hours  potassium chloride    Tablet ER 40 milliEquivalent(s) Oral once  potassium chloride  10 mEq/100 mL IVPB 10 milliEquivalent(s) IV Intermittent every 1 hour  sodium chloride 0.9%. 1000 milliLiter(s) IV Continuous <Continuous>  sodium phosphate 30 milliMole(s)/500 mL IVPB 30 milliMole(s) IV Intermittent once    PRN Inpatient Medications  acetaminophen     Tablet .. 975 milliGRAM(s) Oral every 6 hours PRN  HYDROmorphone  Injectable 0.2 milliGRAM(s) IV Push every 4 hours PRN  HYDROmorphone  Injectable 0.5 milliGRAM(s) IV Push every 4 hours PRN  ondansetron Injectable 4 milliGRAM(s) IV Push every 8 hours PRN      REVIEW OF SYSTEMS  --------------------------------------------------------------------------------  Gen: No fever  Respiratory: No dyspnea  CV: No chest pain  GI: No abdominal pain, diarrhea, constipation, nausea, vomiting  : No increased frequency, dysuria, hematuria  Skin: No rashes  MSK: No edema  Neuro: No dizziness/lightheadedness    All other systems were reviewed and are negative, except as noted.    VITALS/PHYSICAL EXAM  --------------------------------------------------------------------------------  T(C): 37.2 (02-14-25 @ 13:40), Max: 39.4 (02-13-25 @ 22:09)  HR: 81 (02-14-25 @ 13:40) (73 - 110)  BP: 134/76 (02-14-25 @ 13:40) (97/60 - 156/69)  RR: 18 (02-14-25 @ 13:40) (16 - 18)  SpO2: 98% (02-14-25 @ 13:40) (95% - 98%)  Wt(kg): --  Height (cm): 157.5 (02-13-25 @ 17:49)  Weight (kg): 56.7 (02-13-25 @ 17:49)  BMI (kg/m2): 22.9 (02-13-25 @ 17:49)  BSA (m2): 1.57 (02-13-25 @ 17:49)      02-13-25 @ 07:01  -  02-14-25 @ 07:00  --------------------------------------------------------  IN: 400 mL / OUT: 0 mL / NET: 400 mL    02-14-25 @ 07:01  -  02-14-25 @ 14:35  --------------------------------------------------------  IN: 0 mL / OUT: 0 mL / NET: 0 mL      PHYSICAL EXAM:  Gen: NAD  Neuro: non-focal  HEENT: Anicteric, MMM  Pulm: CTA B/L  CV: +S1S2  Abd: soft, non-tender/non-distended  Extremities: no edema  Skin: Warm    LABS/STUDIES  --------------------------------------------------------------------------------              11.0   1.67  >-----------<  159      [02-13-25 @ 19:12]              33.3     129  |  98  |  11  ----------------------------<  95      [02-14-25 @ 12:14]  3.4   |  19  |  0.98        Ca     8.9     [02-14-25 @ 12:14]      Mg     1.9     [02-14-25 @ 12:14]      Phos  1.7     [02-14-25 @ 12:14]    TPro  5.7  /  Alb  2.4  /  TBili  0.7  /  DBili  x   /  AST  82  /  ALT  62  /  AlkPhos  238  [02-14-25 @ 01:55]    Creatinine Trend:  SCr 0.98 [02-14 @ 12:14]  SCr 0.92 [02-14 @ 01:55]  SCr 1.09 [02-13 @ 19:12]    Iron 16, TIBC 229, %sat 7      [01-07-25 @ 07:38]  Ferritin 192      [01-08-25 @ 07:42]  TSH 2.93      [01-06-25 @ 06:11]  Lipid: chol 198, , HDL 59, LDL --      [01-07-25 @ 07:38]    HBsAg Nonreact      [01-06-25 @ 06:11]  HBcAb Nonreact      [01-08-25 @ 07:42]  HCV 0.50, Nonreact      [01-06-25 @ 06:11]  HIV Nonreact      [01-06-25 @ 06:11]    JOSELUIS: titer >1:2560, pattern DFS70      [07-10-22 @ 15:29]  Rheumatoid Factor 49      [01-08-25 @ 07:42]  ANCA: cANCA Negative, pANCA Negative, atypical ANCA Indeterminate Method interference due to JOSELUIS fluorescence      [07-10-22 @ 15:29]

## 2025-02-14 NOTE — H&P ADULT - ATTENDING COMMENTS
denies abd pain  abd benign  ct- sigmoid diverticulitis  newly diagnosed leukemia  iv abx per ID  heme/onc eval

## 2025-02-14 NOTE — H&P ADULT - ASSESSMENT
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 In ED febrile to Tmax 103 and leukopenic, otherwise stable. Denies pain but LLQ TTP on exam. 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    Plan:  - Admit to Dr. Graff  - NPO/IVF  - IV abx  - Tylenol q6, dilaudid prn  - Consider IM/heme onc consult in AM    Plan preliminary pending discussion with attending    [facility]  [id number]

## 2025-02-14 NOTE — CONSULT NOTE ADULT - ASSESSMENT
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 In ED febrile to Tmax 103. ANC 30. Hematology consulted for febrile neutropenia.     CBC today (2/14/25): WBC 1.67, Hgb 11.0, ANC 20,   CT a/p (2/13/25): acute sigmoid diverticulitis with intramural abscess 1.6 x 1.1 cm    # History of T-LGL  - Follows with Dr. White at [facility]  - Hematopath report (1/10/25): hypercellular marrow with markedly left-shifted granulopoiesis and atypical T-large granular lymphocytosis with aberrant loss of CD5 expression. Aberrant cells 29% of total CD8+, CD45, CD2, CD3, CD7, CD57 (partial) positive.  - Dr. White was planning to start treatment with Methotrexate 10 mg/m2 weekly and if ANC < 200 prednisone 1 mg/kg daily for 1 month and then taper    Recommendations:  - No plans for inpatient chemotherapy and no plans for steroids given active infection.  - Infectious workup and management per primary team  - Appreciate GI and surgery recommendations   - Will review peripheral smear  - Please order immunoglobulin panel for the morning. If IgG low, would give IVIG.  - Please give zarxio 300 mcg daily x 3 days  - Trend CBC w/ diff daily  - Will follow up with Dr. White at hospital discharge for treatment of his T-cell LGL. Dr. White emailed 2/14/25.    d/w Dr. 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 In ED febrile to Tmax 103. ANC 30. Hematology consulted for febrile neutropenia.     CBC today (2/14/25): WBC 1.67, Hgb 11.0, ANC 20,   CT a/p (2/13/25): acute sigmoid diverticulitis with intramural abscess 1.6 x 1.1 cm    # History of T-LGL  - Follows with Dr. White at [facility]  - Hematopath report (1/10/25): hypercellular marrow with markedly left-shifted granulopoiesis and atypical T-large granular lymphocytosis with aberrant loss of CD5 expression. Aberrant cells 29% of total CD8+, CD45, CD2, CD3, CD7, CD57 (partial) positive.  - Dr. White was planning to start treatment with Methotrexate 10 mg/m2 weekly and if ANC < 200 prednisone 1 mg/kg daily for 1 month and then taper    Recommendations:  - No plans for inpatient chemotherapy and no plans for steroids given active infection.  - Infectious workup and management per primary team  - Appreciate GI and surgery recommendations   - Will review peripheral smear  - Please give zarxio 300 mcg daily until ANC > 1500  - Trend CBC w/ diff daily  - Will follow up with Dr. White at hospital discharge for treatment of his T-cell LGL. Dr. White emailed 2/14/25.    d/w Dr. 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 In ED febrile to Tmax 103. ANC 30. Hematology consulted for febrile neutropenia.     CBC today (2/14/25): WBC 1.67, Hgb 11.0, ANC 20,   CT a/p (2/13/25): acute sigmoid diverticulitis with intramural abscess 1.6 x 1.1 cm  Peripheral smear (2/14/25): hypochromic RBCs normal morphology, many lymphocytes and LGLs, normal platelet quantity and morphology    # History of T-LGL  - [facility]. [facility] at [facility]  - Hematopath report (1/10/25): hypercellular marrow with markedly left-shifted granulopoiesis and atypical T-large granular lymphocytosis with aberrant loss of CD5 expression. Aberrant cells 29% of total CD8+, CD45, CD2, CD3, CD7, CD57 (partial) positive.  - Dr. White was planning to start treatment with Methotrexate 10 mg/m2 weekly and if ANC < 200 prednisone 1 mg/kg daily for 1 month and then taper    Recommendations:  - No plans for inpatient chemotherapy and no plans for steroids given active infection.  - Infectious workup and management per primary team  - Appreciate GI and surgery recommendations   - Please give zarxio 300 mcg daily until ANC > 1500  - Trend CBC w/ diff daily  - Will follow up with Dr. White at hospital discharge for treatment of his T-cell LGL. Dr. White emailed 2/14/25.    d/w Dr. Gupta    ***************************************************************  La Hunter, PGY5  Fellow Hematology/Oncology  pager: [phone]   Available on [employer] Teams  After 5pm or on weekends please contact  to page on-call fellow   ***************************************************************

## 2025-02-14 NOTE — CONSULT NOTE ADULT - SUBJECTIVE AND OBJECTIVE BOX
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 Last BM 2 days ago was her baseline soft brown nonbloody; since then she has had the urge to defecate but has not had any BM. She began having intermittent fevers and was brought in by her son for evaluation. In ED febrile to Tmax 103 and leukopenic. Denies abdominal pain, CP, SOB, N/V/D. No PSH, has never had a colonoscopy. Son at bedside translated and supplemented history. (14 Feb 2025 01:48)      14 point ROS otherwise negative    PAST MEDICAL & SURGICAL HISTORY:  HLD (hyperlipidemia)      Leukemia      No significant past surgical history          Allergies    No Known Allergies    Intolerances        MEDICATIONS  (STANDING):  amLODIPine   Tablet 5 milliGRAM(s) Oral daily  enoxaparin Injectable 40 milliGRAM(s) SubCutaneous every 24 hours  influenza  Vaccine (HIGH DOSE) 0.5 milliLiter(s) IntraMuscular once  piperacillin/tazobactam IVPB.. 3.375 Gram(s) IV Intermittent every 8 hours  potassium chloride  10 mEq/100 mL IVPB 10 milliEquivalent(s) IV Intermittent every 1 hour  sodium chloride 0.9%. 1000 milliLiter(s) (100 mL/Hr) IV Continuous <Continuous>  sodium phosphate 30 milliMole(s)/500 mL IVPB 30 milliMole(s) IV Intermittent once    MEDICATIONS  (PRN):  acetaminophen     Tablet .. 975 milliGRAM(s) Oral every 6 hours PRN Temp greater or equal to 38C (100.4F), Mild Pain (1 - 3)  HYDROmorphone  Injectable 0.2 milliGRAM(s) IV Push every 4 hours PRN Moderate Pain (4 - 6)  HYDROmorphone  Injectable 0.5 milliGRAM(s) IV Push every 4 hours PRN Severe Pain (7 - 10)  ondansetron Injectable 4 milliGRAM(s) IV Push every 8 hours PRN Nausea and/or Vomiting      FAMILY HISTORY:      SOCIAL HISTORY: No EtOH, no tobacco    Height (cm): 157.5 (02-13 @ 17:49)  Weight (kg): 56.7 (02-13 @ 17:49)  BMI (kg/m2): 22.9 (02-13 @ 17:49)  BSA (m2): 1.57 (02-13 @ 17:49)    VITALS:   T(F): 98.9 (02-14-25 @ 13:40), Max: 103 (02-13-25 @ 22:09)  HR: 81 (02-14-25 @ 13:40)  BP: 134/76 (02-14-25 @ 13:40)  RR: 18 (02-14-25 @ 13:40)  SpO2: 98% (02-14-25 @ 13:40)  Wt(kg): --    PHYSICAL EXAM    GENERAL: NAD, well-developed  HEAD:  Atraumatic, Normocephalic  EYES: EOMI, PERRLA, conjunctiva and sclera clear  NECK: Supple, No JVD  CHEST/LUNG: Clear to auscultation bilaterally; No wheeze  HEART: Regular rate and rhythm; No murmurs, rubs, or gallops  ABDOMEN: Soft, mildly distended, mild LLQ TTP. No rebound/guarding.  EXTREMITIES:  2+ Peripheral Pulses, No clubbing, cyanosis, or edema  NEUROLOGY: non-focal  SKIN: No rashes or lesions    LABS:                         11.0   1.67  )-----------( 159      ( 13 Feb 2025 19:12 )             33.3     02-14    129[L]  |  98  |  11  ----------------------------<  95  3.4[L]   |  19[L]  |  0.98    Ca    8.9      14 Feb 2025 12:14  Phos  1.7     02-14  Mg     1.9     02-14    TPro  5.7[L]  /  Alb  2.4[L]  /  TBili  0.7  /  DBili  x   /  AST  82[H]  /  ALT  62[H]  /  AlkPhos  238[H]  02-14    Magnesium: 1.9 mg/dL (02-14 @ 12:14)  Phosphorus: 1.7 mg/dL (02-14 @ 12:14)      Clean Catch Clean Catch (Midstream)  01-05 @ 20:36   <10,000 CFU/mL Normal Urogenital Giovanna  --  --      .Blood BLOOD  01-05 @ 17:45   No growth at 5 days  --  --      .Blood BLOOD  01-05 @ 17:30   No growth at 5 days  --  --          IMAGING:

## 2025-02-14 NOTE — H&P ADULT - NSHPPHYSICALEXAM_GEN_ALL_CORE
T(C): 36.8 (02-14-25 @ 00:44), Max: 39.4 (02-13-25 @ 22:09)  HR: 73 (02-14-25 @ 00:44) (73 - 110)  BP: 97/60 (02-14-25 @ 00:44) (97/60 - 138/87)  RR: 16 (02-14-25 @ 00:44) (16 - 18)  SpO2: 96% (02-14-25 @ 00:44) (95% - 96%)    CONSTITUTIONAL: Well groomed, NAD  HEENT: NC/AT, no scleral icterus  RESP: Nonlabored breathing on RA  CV: Hemodynamically stable  GI: Soft, mildly distended, mild LLQ TTP. No rebound/guarding  MSK: Grossly symmetric WWP  NEURO: CN II-XII intact; sensation intact in upper and lower extremities b/l   PSYCH: Awake, A+O x 3, calm

## 2025-02-15 LAB
ALBUMIN SERPL ELPH-MCNC: 3.2 G/DL — LOW (ref 3.3–5)
ALP SERPL-CCNC: 337 U/L — HIGH (ref 40–120)
ALT FLD-CCNC: 62 U/L — HIGH (ref 10–45)
ANION GAP SERPL CALC-SCNC: 13 MMOL/L — SIGNIFICANT CHANGE UP (ref 5–17)
ANION GAP SERPL CALC-SCNC: 15 MMOL/L — SIGNIFICANT CHANGE UP (ref 5–17)
APTT BLD: 28.6 SEC — SIGNIFICANT CHANGE UP (ref 24.5–35.6)
AST SERPL-CCNC: 46 U/L — HIGH (ref 10–40)
BILIRUB DIRECT SERPL-MCNC: 0.3 MG/DL — SIGNIFICANT CHANGE UP (ref 0–0.3)
BILIRUB INDIRECT FLD-MCNC: 0.3 MG/DL — SIGNIFICANT CHANGE UP (ref 0.2–1)
BILIRUB SERPL-MCNC: 0.6 MG/DL — SIGNIFICANT CHANGE UP (ref 0.2–1.2)
BUN SERPL-MCNC: 11 MG/DL — SIGNIFICANT CHANGE UP (ref 7–23)
BUN SERPL-MCNC: 12 MG/DL — SIGNIFICANT CHANGE UP (ref 7–23)
CALCIUM SERPL-MCNC: 9.2 MG/DL — SIGNIFICANT CHANGE UP (ref 8.4–10.5)
CALCIUM SERPL-MCNC: 9.7 MG/DL — SIGNIFICANT CHANGE UP (ref 8.4–10.5)
CHLORIDE SERPL-SCNC: 101 MMOL/L — SIGNIFICANT CHANGE UP (ref 96–108)
CHLORIDE SERPL-SCNC: 103 MMOL/L — SIGNIFICANT CHANGE UP (ref 96–108)
CO2 SERPL-SCNC: 17 MMOL/L — LOW (ref 22–31)
CO2 SERPL-SCNC: 18 MMOL/L — LOW (ref 22–31)
CREAT SERPL-MCNC: 0.82 MG/DL — SIGNIFICANT CHANGE UP (ref 0.5–1.3)
CREAT SERPL-MCNC: 0.87 MG/DL — SIGNIFICANT CHANGE UP (ref 0.5–1.3)
EGFR: 67 ML/MIN/1.73M2 — SIGNIFICANT CHANGE UP
EGFR: 72 ML/MIN/1.73M2 — SIGNIFICANT CHANGE UP
GLUCOSE SERPL-MCNC: 74 MG/DL — SIGNIFICANT CHANGE UP (ref 70–99)
GLUCOSE SERPL-MCNC: 78 MG/DL — SIGNIFICANT CHANGE UP (ref 70–99)
HCT VFR BLD CALC: 35.1 % — SIGNIFICANT CHANGE UP (ref 34.5–45)
HGB BLD-MCNC: 11.5 G/DL — SIGNIFICANT CHANGE UP (ref 11.5–15.5)
INR BLD: 0.87 RATIO — SIGNIFICANT CHANGE UP (ref 0.85–1.16)
MAGNESIUM SERPL-MCNC: 2.1 MG/DL — SIGNIFICANT CHANGE UP (ref 1.6–2.6)
MCHC RBC-ENTMCNC: 27.3 PG — SIGNIFICANT CHANGE UP (ref 27–34)
MCHC RBC-ENTMCNC: 32.8 G/DL — SIGNIFICANT CHANGE UP (ref 32–36)
MCV RBC AUTO: 83.4 FL — SIGNIFICANT CHANGE UP (ref 80–100)
NRBC BLD AUTO-RTO: 0 /100 WBCS — SIGNIFICANT CHANGE UP (ref 0–0)
PHOSPHATE SERPL-MCNC: 2.2 MG/DL — LOW (ref 2.5–4.5)
PLATELET # BLD AUTO: 158 K/UL — SIGNIFICANT CHANGE UP (ref 150–400)
POTASSIUM SERPL-MCNC: 3.7 MMOL/L — SIGNIFICANT CHANGE UP (ref 3.5–5.3)
POTASSIUM SERPL-MCNC: 4 MMOL/L — SIGNIFICANT CHANGE UP (ref 3.5–5.3)
POTASSIUM SERPL-SCNC: 3.7 MMOL/L — SIGNIFICANT CHANGE UP (ref 3.5–5.3)
POTASSIUM SERPL-SCNC: 4 MMOL/L — SIGNIFICANT CHANGE UP (ref 3.5–5.3)
PROT SERPL-MCNC: 7.3 G/DL — SIGNIFICANT CHANGE UP (ref 6–8.3)
PROTHROM AB SERPL-ACNC: 9.9 SEC — SIGNIFICANT CHANGE UP (ref 9.9–13.4)
RBC # BLD: 4.21 M/UL — SIGNIFICANT CHANGE UP (ref 3.8–5.2)
RBC # FLD: 14.6 % — HIGH (ref 10.3–14.5)
SODIUM SERPL-SCNC: 133 MMOL/L — LOW (ref 135–145)
SODIUM SERPL-SCNC: 134 MMOL/L — LOW (ref 135–145)
UUN UR-MCNC: 376 MG/DL — SIGNIFICANT CHANGE UP
WBC # BLD: 1.71 K/UL — LOW (ref 3.8–10.5)
WBC # FLD AUTO: 1.71 K/UL — LOW (ref 3.8–10.5)

## 2025-02-15 PROCEDURE — 99222 1ST HOSP IP/OBS MODERATE 55: CPT

## 2025-02-15 PROCEDURE — 99233 SBSQ HOSP IP/OBS HIGH 50: CPT | Mod: GC

## 2025-02-15 PROCEDURE — G0545: CPT

## 2025-02-15 RX ORDER — SODIUM PHOSPHATE,DIBASIC DIHYD
30 POWDER (GRAM) MISCELLANEOUS ONCE
Refills: 0 | Status: COMPLETED | OUTPATIENT
Start: 2025-02-15 | End: 2025-02-15

## 2025-02-15 RX ADMIN — FILGRASTIM 300 MICROGRAM(S): 300 INJECTION, SOLUTION INTRAVENOUS; SUBCUTANEOUS at 13:56

## 2025-02-15 RX ADMIN — Medication 85 MILLIMOLE(S): at 13:57

## 2025-02-15 RX ADMIN — ENOXAPARIN SODIUM 40 MILLIGRAM(S): 100 INJECTION SUBCUTANEOUS at 14:00

## 2025-02-15 RX ADMIN — Medication 25 GRAM(S): at 13:58

## 2025-02-15 RX ADMIN — Medication 25 GRAM(S): at 05:07

## 2025-02-15 RX ADMIN — AMLODIPINE BESYLATE 5 MILLIGRAM(S): 10 TABLET ORAL at 05:07

## 2025-02-15 RX ADMIN — Medication 25 GRAM(S): at 21:22

## 2025-02-15 NOTE — PROGRESS NOTE ADULT - ASSESSMENT
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 In ED febrile to Tmax 103 and leukopenic, otherwise stable. Denies pain but LLQ TTP on exam. 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    Plan:  - ID consult pending  - nephro and medicine following sodium  - heme/onc following for leukemia: started on Zarxio, outpatient followup at discharge  - IV abx  - Tylenol q6, dilaudid prn  - Diet: CLD    Plan preliminary pending discussion with attending    [facility]  [id number]

## 2025-02-15 NOTE — PROGRESS NOTE ADULT - SUBJECTIVE AND OBJECTIVE BOX
Name of Patient : PAVEL LEYVA  MRN: [id number]      Subjective: Patient seen and examined. No new events except as noted.   doing okay  electrolytes improved         REVIEW OF SYSTEMS:    CONSTITUTIONAL: No weakness, fevers or chills  EYES/ENT: No visual changes;  No vertigo or throat pain   NECK: No pain or stiffness  RESPIRATORY: No cough, wheezing, hemoptysis; No shortness of breath  CARDIOVASCULAR: No chest pain or palpitations  GASTROINTESTINAL: No abdominal or epigastric pain. No nausea, vomiting, or hematemesis; No diarrhea or constipation. No melena or hematochezia.  GENITOURINARY: No dysuria, frequency or hematuria  NEUROLOGICAL: No numbness or weakness  SKIN: No itching, burning, rashes, or lesions   All other review of systems is negative unless indicated above.    MEDICATIONS:  MEDICATIONS  (STANDING):  amLODIPine   Tablet 5 milliGRAM(s) Oral daily  enoxaparin Injectable 40 milliGRAM(s) SubCutaneous every 24 hours  filgrastim-sndz (ZARXIO) Injectable 300 MICROGram(s) SubCutaneous daily  influenza  Vaccine (HIGH DOSE) 0.5 milliLiter(s) IntraMuscular once  piperacillin/tazobactam IVPB.. 3.375 Gram(s) IV Intermittent every 8 hours  sodium chloride 0.9%. 1000 milliLiter(s) (100 mL/Hr) IV Continuous <Continuous>      PHYSICAL EXAM:  T(C): 36.9 (02-15-25 @ 21:05), Max: 37 (02-15-25 @ 01:51)  HR: 79 (02-15-25 @ 21:05) (69 - 79)  BP: 147/74 (02-15-25 @ 21:05) (121/70 - 155/82)  RR: 18 (02-15-25 @ 21:05) (18 - 18)  SpO2: 95% (02-15-25 @ 21:05) (95% - 98%)  Wt(kg): --  I&O's Summary    14 Feb 2025 07:01  -  15 Feb 2025 07:00  --------------------------------------------------------  IN: 1400 mL / OUT: 0 mL / NET: 1400 mL    15 Feb 2025 07:01  -  16 Feb 2025 00:31  --------------------------------------------------------  IN: 360 mL / OUT: 0 mL / NET: 360 mL          Appearance: Normal	  HEENT:  PERRLA   Lymphatic: No lymphadenopathy   Cardiovascular: Normal S1 S2, no JVD  Respiratory: normal effort , clear  Gastrointestinal:  Soft, Non-tender  Skin: No rashes,  warm to touch  Psychiatry:  Mood & affect appropriate  Musculuskeletal: No edema    recent labs, Imaging and EKGs personally reviewed     02-14-25 @ 07:01  -  02-15-25 @ 07:00  --------------------------------------------------------  IN: 1400 mL / OUT: 0 mL / NET: 1400 mL    02-15-25 @ 07:01  -  02-16-25 @ 00:31  --------------------------------------------------------  IN: 360 mL / OUT: 0 mL / NET: 360 mL                          11.5   1.71  )-----------( 158      ( 15 Feb 2025 09:08 )             35.1               02-15    133[L]  |  101  |  12  ----------------------------<  74  3.7   |  17[L]  |  0.82    Ca    9.7      15 Feb 2025 09:08  Phos  2.2     02-15  Mg     2.1     02-15    TPro  7.3  /  Alb  3.2[L]  /  TBili  0.6  /  DBili  0.3  /  AST  46[H]  /  ALT  62[H]  /  AlkPhos  337[H]  02-15    PT/INR - ( 15 Feb 2025 09:08 )   PT: 9.9 sec;   INR: 0.87 ratio         PTT - ( 15 Feb 2025 09:08 )  PTT:28.6 sec                   Urinalysis Basic - ( 15 Feb 2025 09:08 )    Color: x / Appearance: x / SG: x / pH: x  Gluc: 74 mg/dL / Ketone: x  / Bili: x / Urobili: x   Blood: x / Protein: x / Nitrite: x   Leuk Esterase: x / RBC: x / WBC x   Sq Epi: x / Non Sq Epi: x / Bacteria: x

## 2025-02-15 NOTE — PROGRESS NOTE ADULT - SUBJECTIVE AND OBJECTIVE BOX
Hematology Follow-up    INTERVAL HPI/OVERNIGHT EVENTS:  Patient S&E at bedside. No o/n events, patient resting comfortably. No complaints at this time. Patient denies fever, chills, dizziness, weakness, CP, palpitations, SOB, cough, N/V/D/C, dysuria, changes in bowel movements, LE edema.    VITAL SIGNS:  T(F): 98.6 (02-15-25 @ 08:53)  HR: 74 (02-15-25 @ 08:53)  BP: 129/78 (02-15-25 @ 08:53)  RR: 18 (02-15-25 @ 08:53)  SpO2: 97% (02-15-25 @ 08:53)  Wt(kg): --    PHYSICAL EXAM:    Constitutional: AAOx3, NAD,   Eyes: PERRL, EOMI, sclera non-icteric  Neck: supple, no masses, no JVD  Respiratory: CTA b/l, good air entry b/l, no wheezing, rhonchi, rales, with normal respiratory effort and no intercostal retractions  Cardiovascular: RRR, normal S1S2, no M/R/G  Gastrointestinal: soft, NTND, no masses palpable, BS normal in all four quadrants, no HSM  Extremities:  no c/c/e  Neurological: Grossly intact  Skin: Normal temperature    MEDICATIONS  (STANDING):  amLODIPine   Tablet 5 milliGRAM(s) Oral daily  enoxaparin Injectable 40 milliGRAM(s) SubCutaneous every 24 hours  filgrastim-sndz (ZARXIO) Injectable 300 MICROGram(s) SubCutaneous daily  influenza  Vaccine (HIGH DOSE) 0.5 milliLiter(s) IntraMuscular once  piperacillin/tazobactam IVPB.. 3.375 Gram(s) IV Intermittent every 8 hours  sodium chloride 0.9%. 1000 milliLiter(s) (100 mL/Hr) IV Continuous <Continuous>    MEDICATIONS  (PRN):  acetaminophen     Tablet .. 975 milliGRAM(s) Oral every 6 hours PRN Temp greater or equal to 38C (100.4F), Mild Pain (1 - 3)  HYDROmorphone  Injectable 0.2 milliGRAM(s) IV Push every 4 hours PRN Moderate Pain (4 - 6)  HYDROmorphone  Injectable 0.5 milliGRAM(s) IV Push every 4 hours PRN Severe Pain (7 - 10)  ondansetron Injectable 4 milliGRAM(s) IV Push every 8 hours PRN Nausea and/or Vomiting      No Known Allergies      LABS:                        11.5   1.71  )-----------( 158      ( 15 Feb 2025 09:08 )             35.1     02-15    133[L]  |  101  |  12  ----------------------------<  74  3.7   |  17[L]  |  0.82    Ca    9.7      15 Feb 2025 09:08  Phos  2.2     02-15  Mg     2.1     02-15    TPro  7.3  /  Alb  3.2[L]  /  TBili  0.6  /  DBili  0.3  /  AST  46[H]  /  ALT  62[H]  /  AlkPhos  337[H]  02-15    PT/INR - ( 15 Feb 2025 09:08 )   PT: 9.9 sec;   INR: 0.87 ratio         PTT - ( 15 Feb 2025 09:08 )  PTT:28.6 sec   Urinalysis Basic - ( 15 Feb 2025 09:08 )    Color: x / Appearance: x / SG: x / pH: x  Gluc: 74 mg/dL / Ketone: x  / Bili: x / Urobili: x   Blood: x / Protein: x / Nitrite: x   Leuk Esterase: x / RBC: x / WBC x   Sq Epi: x / Non Sq Epi: x / Bacteria: x        RADIOLOGY & ADDITIONAL TESTS:  Studies reviewed.

## 2025-02-15 NOTE — CONSULT NOTE ADULT - ATTENDING COMMENTS
Patient seen and examined with Fellow, hyponatermia improving with appropriate rate of correction on NS.  Agree with fellow A/P as above.
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 In ED febrile to Tmax 103. ANC 30. Hematology consulted for febrile neutropenia.     CBC today (2/14/25): WBC 1.67, Hgb 11.0, ANC 20,   CT a/p (2/13/25): acute sigmoid diverticulitis with intramural abscess 1.6 x 1.1 cm  Peripheral smear (2/14/25): hypochromic RBCs normal morphology, many lymphocytes and LGLs, normal platelet quantity and morphology    # History of T-LGL  - No plans for inpatient chemotherapy and no plans for steroids given active infection.  - Infectious workup and management per primary team  - Please give zarxio 300 mcg daily until ANC > 1500  - Trend CBC w/ diff daily  will follow
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  In the ED patient with tmax of 103. Labs notable for neutropenia and transaminitis. CTA/P with  sigmoid diverticulitis and  low density collection in the wall of the proximal sigmoid colon measuring 1.6 x 1.1 cm ID asked to help manage.     Culture data:  Blood culture (-)  Urine culture (-)    Antimicrobials:  Zosyn 2/13-    #Sepsis  #Diverticulitis with small abscess  #Neutropenic fever  #History of T-LGL - ANC 30. Started on Zarxio    Recommendations:   - Continue with Zosyn for now   - Monitor CBC and vitals  - Zarxio per heme-onc team  - Diet per surgery team    Priya Rivera MD  ID physician  [employer] Teams Preferred  After 5pm/weekends call [phone]

## 2025-02-15 NOTE — CONSULT NOTE ADULT - SUBJECTIVE AND OBJECTIVE BOX
INFECTIOUS DISEASE CONSULT NOTE    Patient is a 79y old  Female who presents with a chief complaint of diverticulitis (15 Feb 2025 10:23)    HPI: 79F with newly diagnosed T-Large Granular Lymphocytic Leukemia (T-LGLL), hypothyroidism, RA and HTN presented with fever, fatigue and constipation for the past two days.  In the ED patient with tmax of 103. Labs notable for neutropenia and transaminitis. CTA/P with  sigmoid diverticulitis and  low density collection in the wall of the proximal sigmoid colon measuring 1.6 x 1.1 cm ID asked to help manage.     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       REVIEW OF SYSTEMS:      Prior hospital charts reviewed [Yes]  Primary team notes reviewed [Yes]  Other consultant notes reviewed [Yes]    PAST MEDICAL & SURGICAL HISTORY:  HLD (hyperlipidemia)      Leukemia      No significant past surgical history          SOCIAL HISTORY:      FAMILY HISTORY:      Allergies:  No Known Allergies      ANTIMICROBIALS:  piperacillin/tazobactam IVPB.. 3.375 every 8 hours      ANTIMICROBIALS (past 90 days):  MEDICATIONS  (STANDING):  piperacillin/tazobactam IVPB..   25 mL/Hr IV Intermittent (02-15-25 @ 05:07)   25 mL/Hr IV Intermittent (02-14-25 @ 21:29)    piperacillin/tazobactam IVPB..   25 mL/Hr IV Intermittent (02-14-25 @ 14:42)   25 mL/Hr IV Intermittent (02-14-25 @ 05:32)    piperacillin/tazobactam IVPB...   200 mL/Hr IV Intermittent (02-13-25 @ 22:59)    vancomycin  IVPB.   250 mL/Hr IV Intermittent (02-14-25 @ 00:43)        OTHER MEDS:   MEDICATIONS  (STANDING):  acetaminophen     Tablet .. 975 every 6 hours PRN  amLODIPine   Tablet 5 daily  enoxaparin Injectable 40 every 24 hours  filgrastim-sndz (ZARXIO) Injectable 300 daily  HYDROmorphone  Injectable 0.2 every 4 hours PRN  HYDROmorphone  Injectable 0.5 every 4 hours PRN  influenza  Vaccine (HIGH DOSE) 0.5 once  ondansetron Injectable 4 every 8 hours PRN      VITALS:  Vital Signs Last 24 Hrs  T(F): 98.6 (02-15-25 @ 08:53), Max: 103 (02-13-25 @ 22:09)    Vital Signs Last 24 Hrs  HR: 74 (02-15-25 @ 08:53) (69 - 81)  BP: 129/78 (02-15-25 @ 08:53) (121/70 - 148/80)  RR: 18 (02-15-25 @ 08:53)  SpO2: 97% (02-15-25 @ 08:53) (96% - 98%)  Wt(kg): --    EXAM:      Labs:                        11.5   1.71  )-----------( 158      ( 15 Feb 2025 09:08 )             35.1     02-15    133[L]  |  101  |  12  ----------------------------<  74  3.7   |  17[L]  |  0.82    Ca    9.7      15 Feb 2025 09:08  Phos  2.2     02-15  Mg     2.1     02-15    TPro  7.3  /  Alb  3.2[L]  /  TBili  0.6  /  DBili  0.3  /  AST  46[H]  /  ALT  62[H]  /  AlkPhos  337[H]  02-15      WBC Trend:  WBC Count: 1.71 (02-15-25 @ 09:08)  WBC Count: 1.67 (02-13-25 @ 19:12)      Auto Neutrophil #: 0.03 K/uL (01-21-25 @ 13:21)  Auto Neutrophil #: 0.16 K/uL (01-14-25 @ 06:34)  Auto Neutrophil #: 0.09 K/uL (01-13-25 @ 07:26)  Band Neutrophils %: 0.9 % (01-13-25 @ 07:26)  Auto Neutrophil #: 0.09 K/uL (01-12-25 @ 11:22)      Creatine Trend:  Creatinine: 0.82 (02-15)  Creatinine: 0.87 (02-15)  Creatinine: 0.98 (02-14)  Creatinine: 0.92 (02-14)      Liver Biochemical Testing Trend:  Alanine Aminotransferase (ALT/SGPT): 62 *H* (02-15)  Alanine Aminotransferase (ALT/SGPT): 62 *H* (02-14)  Alanine Aminotransferase (ALT/SGPT): 88 *H* (02-13)  Alanine Aminotransferase (ALT/SGPT): 24 (01-14)  Alanine Aminotransferase (ALT/SGPT): 28 (01-13)  Aspartate Aminotransferase (AST/SGOT): 46 (02-15-25 @ 09:08)  Aspartate Aminotransferase (AST/SGOT): 82 (02-14-25 @ 01:55)  Aspartate Aminotransferase (AST/SGOT): 97 (02-13-25 @ 19:12)  Aspartate Aminotransferase (AST/SGOT): 25 (01-14-25 @ 06:34)  Aspartate Aminotransferase (AST/SGOT): 26 (01-13-25 @ 07:28)  Bilirubin Direct: 0.3 (02-15)  Bilirubin Total: 0.6 (02-15)  Bilirubin Total: 0.7 (02-14)  Bilirubin Total: 1.0 (02-13)  Bilirubin Total: 0.3 (01-14)      Trend LDH  01-06-25 @ 08:26  292[H]          Urinalysis Basic - ( 15 Feb 2025 09:08 )    Color: x / Appearance: x / SG: x / pH: x  Gluc: 74 mg/dL / Ketone: x  / Bili: x / Urobili: x   Blood: x / Protein: x / Nitrite: x   Leuk Esterase: x / RBC: x / WBC x   Sq Epi: x / Non Sq Epi: x / Bacteria: x        MICROBIOLOGY:    MRSA PCR Result.: NotDetec (01-06-25 @ 10:32)      Culture - Urine (collected 14 Feb 2025 00:34)  Source: Clean Catch Clean Catch (Midstream)  Final Report:    <10,000 CFU/mL Normal Urogenital Giovanna    Culture - Blood (collected 13 Feb 2025 18:50)  Source: .Blood BLOOD  Preliminary Report:    No growth at 24 hours    Culture - Blood (collected 13 Feb 2025 17:45)  Source: .Blood BLOOD  Preliminary Report:    No growth at 24 hours    Culture - Urine (collected 05 Jan 2025 20:36)  Source: Clean Catch Clean Catch (Midstream)  Final Report:    <10,000 CFU/mL Normal Urogenital Giovanna    Culture - Blood (collected 05 Jan 2025 17:45)  Source: .Blood BLOOD  Final Report:    No growth at 5 days    Culture - Blood (collected 05 Jan 2025 17:30)  Source: .Blood BLOOD  Final Report:    No growth at 5 days      HIV-1/2 Combo Result: Nonreact (01-06-25 @ 06:11)                                          Blood Gas Venous - Lactate: 1.2 (02-13 @ 21:40)    A1C with Estimated Average Glucose Result: 5.7 % (01-07-25 @ 07:38)      RADIOLOGY:  < from: CT Abdomen and Pelvis w/ IV Cont (02.13.25 @ 22:06) >  PROCEDURE:  CT of the Abdomen and Pelvis was performed.  Sagittal and coronal reformats were performed.    FINDINGS:  LOWER CHEST: Trace bibasilar dependent atelectasis.    LIVER: A few calcified granulomas.  BILE DUCTS: Normal caliber.  GALLBLADDER: Within normal limits.  SPLEEN: Within normal limits.  PANCREAS: Within normal limits.  ADRENALS: Within normal limits.  KIDNEYS/URETERS: Within normal limits.    BLADDER: Within normal limits.  REPRODUCTIVE ORGANS: Uterus and adnexa within normal limits.    BOWEL: Diverticulosis of the descending and sigmoid colon. Focal   inflammation and wall thickening surrounds a proximal sigmoid   diverticulum. Poorly defined low density collection in the wall of the   proximal sigmoid colon measures 1.6 x 1.1 cm. No bowel obstruction.   Appendix is normal.  PERITONEUM/RETROPERITONEUM: No free air.  VESSELS: Atherosclerotic changes.  LYMPH NODES: No lymphadenopathy.  ABDOMINAL WALL: Within normal limits.  BONES: Degenerative changes. Sacral Tarlov cyst.    IMPRESSION:  Acute sigmoid diverticulitis with intramural ill-defined fluid   collection, likely developing intramural abscess.    < end of copied text >  imaging below personally reviewed   INFECTIOUS DISEASE CONSULT NOTE    Patient is a 79y old  Female who presents with a chief complaint of diverticulitis (15 Feb 2025 10:23)    HPI: 79F with newly diagnosed T-Large Granular Lymphocytic Leukemia (T-LGLL not on chemotherapy), hypothyroidism, RA and HTN presented with fever, fatigue and constipation for the past two days.  In the ED patient with tmax of 103. Labs notable for neutropenia and transaminitis. CTA/P with  sigmoid diverticulitis and  low density collection in the wall of the proximal sigmoid colon measuring 1.6 x 1.1 cm ID asked to help manage.     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       REVIEW OF SYSTEMS:  Constitutional: +chills  Skin: No rash, no phlebitis	  Eyes: No discharge, No change in vision	  ENMT: No sore throat, No ulcers  Respiratory: No cough, no SOB  Cardiovascular:  No chest pain, No palpitations   Gastrointestinal: +constipation (resolved)  Genitourinary: No dysuria  MSK: No Joint pain  Neurological: No HA      Prior hospital charts reviewed [Yes]  Primary team notes reviewed [Yes]  Other consultant notes reviewed [Yes]    PAST MEDICAL & SURGICAL HISTORY:  HLD (hyperlipidemia)  Leukemia  No significant past surgical history          SOCIAL HISTORY:  Lives with family   Born in [facility]  Denies smoking or alcohol use    FAMILY HISTORY:  No family history of diverticulitis    Allergies:  No Known Allergies      ANTIMICROBIALS:  piperacillin/tazobactam IVPB.. 3.375 every 8 hours      ANTIMICROBIALS (past 90 days):  MEDICATIONS  (STANDING):  piperacillin/tazobactam IVPB..   25 mL/Hr IV Intermittent (02-15-25 @ 05:07)   25 mL/Hr IV Intermittent (02-14-25 @ 21:29)    piperacillin/tazobactam IVPB..   25 mL/Hr IV Intermittent (02-14-25 @ 14:42)   25 mL/Hr IV Intermittent (02-14-25 @ 05:32)    piperacillin/tazobactam IVPB...   200 mL/Hr IV Intermittent (02-13-25 @ 22:59)    vancomycin  IVPB.   250 mL/Hr IV Intermittent (02-14-25 @ 00:43)        OTHER MEDS:   MEDICATIONS  (STANDING):  acetaminophen     Tablet .. 975 every 6 hours PRN  amLODIPine   Tablet 5 daily  enoxaparin Injectable 40 every 24 hours  filgrastim-sndz (ZARXIO) Injectable 300 daily  HYDROmorphone  Injectable 0.2 every 4 hours PRN  HYDROmorphone  Injectable 0.5 every 4 hours PRN  influenza  Vaccine (HIGH DOSE) 0.5 once  ondansetron Injectable 4 every 8 hours PRN      VITALS:  Vital Signs Last 24 Hrs  T(F): 98.6 (02-15-25 @ 08:53), Max: 103 (02-13-25 @ 22:09)    Vital Signs Last 24 Hrs  HR: 74 (02-15-25 @ 08:53) (69 - 81)  BP: 129/78 (02-15-25 @ 08:53) (121/70 - 148/80)  RR: 18 (02-15-25 @ 08:53)  SpO2: 97% (02-15-25 @ 08:53) (96% - 98%)  Wt(kg): --    EXAM:  General: Patient appears comfortable, in no acute distress  HEENT: PERRL, anicteric sclera, mucous membranes moist   Neck: Supple, No lymphadenopathy  CV: +S1/S2, RRR, no murmurs heard  Lungs: No respiratory distress, CTA b/l  Abd:  BS4+, Soft, NTND, no guarding  : No suprapubic tenderness  Neuro: AAOx3. No focal deficits noted.   Ext: No cyanosis, no edema  Msk: freely moving upper and lower extremities  Skin: No rash, no phlebitis, No erythema      Labs:                        11.5   1.71  )-----------( 158      ( 15 Feb 2025 09:08 )             35.1     02-15    133[L]  |  101  |  12  ----------------------------<  74  3.7   |  17[L]  |  0.82    Ca    9.7      15 Feb 2025 09:08  Phos  2.2     02-15  Mg     2.1     02-15    TPro  7.3  /  Alb  3.2[L]  /  TBili  0.6  /  DBili  0.3  /  AST  46[H]  /  ALT  62[H]  /  AlkPhos  337[H]  02-15      WBC Trend:  WBC Count: 1.71 (02-15-25 @ 09:08)  WBC Count: 1.67 (02-13-25 @ 19:12)      Auto Neutrophil #: 0.03 K/uL (01-21-25 @ 13:21)  Auto Neutrophil #: 0.16 K/uL (01-14-25 @ 06:34)  Auto Neutrophil #: 0.09 K/uL (01-13-25 @ 07:26)  Band Neutrophils %: 0.9 % (01-13-25 @ 07:26)  Auto Neutrophil #: 0.09 K/uL (01-12-25 @ 11:22)      Creatine Trend:  Creatinine: 0.82 (02-15)  Creatinine: 0.87 (02-15)  Creatinine: 0.98 (02-14)  Creatinine: 0.92 (02-14)      Liver Biochemical Testing Trend:  Alanine Aminotransferase (ALT/SGPT): 62 *H* (02-15)  Alanine Aminotransferase (ALT/SGPT): 62 *H* (02-14)  Alanine Aminotransferase (ALT/SGPT): 88 *H* (02-13)  Alanine Aminotransferase (ALT/SGPT): 24 (01-14)  Alanine Aminotransferase (ALT/SGPT): 28 (01-13)  Aspartate Aminotransferase (AST/SGOT): 46 (02-15-25 @ 09:08)  Aspartate Aminotransferase (AST/SGOT): 82 (02-14-25 @ 01:55)  Aspartate Aminotransferase (AST/SGOT): 97 (02-13-25 @ 19:12)  Aspartate Aminotransferase (AST/SGOT): 25 (01-14-25 @ 06:34)  Aspartate Aminotransferase (AST/SGOT): 26 (01-13-25 @ 07:28)  Bilirubin Direct: 0.3 (02-15)  Bilirubin Total: 0.6 (02-15)  Bilirubin Total: 0.7 (02-14)  Bilirubin Total: 1.0 (02-13)  Bilirubin Total: 0.3 (01-14)      Trend LDH  01-06-25 @ 08:26  292[H]          Urinalysis Basic - ( 15 Feb 2025 09:08 )    Urinalysis (02.14.25 @ 14:44)   pH Urine: 6.0  Glucose Qualitative, Urine: Negative mg/dL  Blood, Urine: Small  Color: Yellow  Urine Appearance: Clear  Bilirubin: Negative  Ketone - Urine: Trace mg/dL  Specific Gravity: 1.016  Protein, Urine: 30 mg/dL  Urobilinogen: 1.0 mg/dL  Nitrite: Negative  Leukocyte Esterase Concentration: Negative    MICROBIOLOGY:    MRSA PCR Result.: NotDetec (01-06-25 @ 10:32)      Culture - Urine (collected 14 Feb 2025 00:34)  Source: Clean Catch Clean Catch (Midstream)  Final Report:    <10,000 CFU/mL Normal Urogenital Giovanna    Culture - Blood (collected 13 Feb 2025 18:50)  Source: .Blood BLOOD  Preliminary Report:    No growth at 24 hours    Culture - Blood (collected 13 Feb 2025 17:45)  Source: .Blood BLOOD  Preliminary Report:    No growth at 24 hours    Culture - Urine (collected 05 Jan 2025 20:36)  Source: Clean Catch Clean Catch (Midstream)  Final Report:    <10,000 CFU/mL Normal Urogenital Giovanna    Culture - Blood (collected 05 Jan 2025 17:45)  Source: .Blood BLOOD  Final Report:    No growth at 5 days    Culture - Blood (collected 05 Jan 2025 17:30)  Source: .Blood BLOOD  Final Report:    No growth at 5 days      HIV-1/2 Combo Result: Nonreact (01-06-25 @ 06:11)                                          Blood Gas Venous - Lactate: 1.2 (02-13 @ 21:40)    A1C with Estimated Average Glucose Result: 5.7 % (01-07-25 @ 07:38)      RADIOLOGY:  < from: CT Abdomen and Pelvis w/ IV Cont (02.13.25 @ 22:06) >  PROCEDURE:  CT of the Abdomen and Pelvis was performed.  Sagittal and coronal reformats were performed.    FINDINGS:  LOWER CHEST: Trace bibasilar dependent atelectasis.    LIVER: A few calcified granulomas.  BILE DUCTS: Normal caliber.  GALLBLADDER: Within normal limits.  SPLEEN: Within normal limits.  PANCREAS: Within normal limits.  ADRENALS: Within normal limits.  KIDNEYS/URETERS: Within normal limits.    BLADDER: Within normal limits.  REPRODUCTIVE ORGANS: Uterus and adnexa within normal limits.    BOWEL: Diverticulosis of the descending and sigmoid colon. Focal   inflammation and wall thickening surrounds a proximal sigmoid   diverticulum. Poorly defined low density collection in the wall of the   proximal sigmoid colon measures 1.6 x 1.1 cm. No bowel obstruction.   Appendix is normal.  PERITONEUM/RETROPERITONEUM: No free air.  VESSELS: Atherosclerotic changes.  LYMPH NODES: No lymphadenopathy.  ABDOMINAL WALL: Within normal limits.  BONES: Degenerative changes. Sacral Tarlov cyst.    IMPRESSION:  Acute sigmoid diverticulitis with intramural ill-defined fluid   collection, likely developing intramural abscess.    < end of copied text >  imaging below personally reviewed

## 2025-02-15 NOTE — PROGRESS NOTE ADULT - ASSESSMENT
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 In ED febrile to Tmax 103. ANC 30. Hematology consulted for febrile neutropenia.     # History of T-LGL  - Follows with Dr. White at [facility] [[email or web address]]  - Dr. White was planning to start treatment with Methotrexate 10 mg/m2 weekly and if ANC < 200 prednisone 1 mg/kg daily for 1 month and then taper    Recommendations:  - No plans for inpatient chemotherapy and no plans for steroids given active infection.  - Infectious workup and management per primary team  - Appreciate GI and surgery recommendations   - Please give zarxio 300 mcg daily until ANC > 1500  - Trend CBC w/ diff daily  - Will follow up with Dr. White at hospital discharge for treatment of his T-cell LGL. Dr. White emailed 2/14/25.  Plan reviewed with Melisa and her son.        Elmira Enrique MD  Attending Physician  [facility] [[email or web address]]  [phone]

## 2025-02-15 NOTE — PROGRESS NOTE ADULT - SUBJECTIVE AND OBJECTIVE BOX
GENERAL SURGERY PROGRESS NOTE    PAVEL LEYVA  |  [id number]      S: NAEON. sodium improved to 134    O:   Vital Signs Last 24 Hrs  T(C): 37 (15 Feb 2025 01:51), Max: 37.4 (14 Feb 2025 04:30)  T(F): 98.6 (15 Feb 2025 01:51), Max: 99.3 (14 Feb 2025 04:30)  HR: 70 (15 Feb 2025 01:51) (70 - 108)  BP: 121/70 (15 Feb 2025 01:51) (121/70 - 156/69)  BP(mean): --  RR: 18 (15 Feb 2025 01:51) (18 - 18)  SpO2: 96% (15 Feb 2025 01:51) (96% - 98%)    Parameters below as of 15 Feb 2025 01:51  Patient On (Oxygen Delivery Method): room air        CONSTITUTIONAL: Well groomed, NAD  HEENT: NC/AT, no scleral icterus  RESP: Nonlabored breathing on RA  CV: Hemodynamically stable  GI: Soft, mildly distended, mild LLQ TTP. No rebound/guarding  MSK: Grossly symmetric WWP  NEURO: CN II-XII intact; sensation intact in upper and lower extremities b/l   PSYCH: Awake, A+O x 3, calm                          11.0   1.67  )-----------( 159      ( 13 Feb 2025 19:12 )             33.3     02-15    134[L]  |  103  |  11  ----------------------------<  78  4.0   |  18[L]  |  0.87    Ca    9.2      15 Feb 2025 00:45  Phos  1.7     02-14  Mg     1.9     02-14    TPro  5.7[L]  /  Alb  2.4[L]  /  TBili  0.7  /  DBili  x   /  AST  82[H]  /  ALT  62[H]  /  AlkPhos  238[H]  02-14 02-13-25 @ 07:01  -  02-14-25 @ 07:00  --------------------------------------------------------  IN: 400 mL / OUT: 0 mL / NET: 400 mL    02-14-25 @ 07:01  -  02-15-25 @ 02:39  --------------------------------------------------------  IN: 0 mL / OUT: 0 mL / NET: 0 mL     Assessment:  · Assessment	  79F with history of HTN, RA, recently diagnosed T-LGL leukemia not yet on treatment presenting for 2 days of fevers and constipation/tenesmus. In ED febrile to Tmax 103 and leukopenic, otherwise stable. Denies pain but LLQ TTP on exam. 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    Plan:  - nephro and medicine following sodium  - heme/onc following for leukemia  - IV abx  - Tylenol q6, dilaudid prn  - Consider IM/heme onc consult in AM    Plan preliminary pending discussion with attending    [facility]  [id number]     GENERAL SURGERY PROGRESS NOTE    PAVEL LEYVA  |  [id number]      S: NAEON. sodium improved to 134    O:   Vital Signs Last 24 Hrs  T(C): 37 (15 Feb 2025 01:51), Max: 37.4 (14 Feb 2025 04:30)  T(F): 98.6 (15 Feb 2025 01:51), Max: 99.3 (14 Feb 2025 04:30)  HR: 70 (15 Feb 2025 01:51) (70 - 108)  BP: 121/70 (15 Feb 2025 01:51) (121/70 - 156/69)  BP(mean): --  RR: 18 (15 Feb 2025 01:51) (18 - 18)  SpO2: 96% (15 Feb 2025 01:51) (96% - 98%)    Parameters below as of 15 Feb 2025 01:51  Patient On (Oxygen Delivery Method): room air        CONSTITUTIONAL: Well groomed, NAD  HEENT: NC/AT, no scleral icterus  RESP: Nonlabored breathing on RA  CV: Hemodynamically stable  GI: Soft, mildly distended, mild LLQ TTP. No rebound/guarding  MSK: Grossly symmetric WWP  NEURO: CN II-XII intact; sensation intact in upper and lower extremities b/l   PSYCH: Awake, A+O x 3, calm                          11.0   1.67  )-----------( 159      ( 13 Feb 2025 19:12 )             33.3     02-15    134[L]  |  103  |  11  ----------------------------<  78  4.0   |  18[L]  |  0.87    Ca    9.2      15 Feb 2025 00:45  Phos  1.7     02-14  Mg     1.9     02-14    TPro  5.7[L]  /  Alb  2.4[L]  /  TBili  0.7  /  DBili  x   /  AST  82[H]  /  ALT  62[H]  /  AlkPhos  238[H]  02-14 02-13-25 @ 07:01  -  02-14-25 @ 07:00  --------------------------------------------------------  IN: 400 mL / OUT: 0 mL / NET: 400 mL    02-14-25 @ 07:01  -  02-15-25 @ 02:39  --------------------------------------------------------  IN: 0 mL / OUT: 0 mL / NET: 0 mL     Assessment:  ·

## 2025-02-15 NOTE — CONSULT NOTE ADULT - ASSESSMENT
Impression/Hospital Course:   79F with newly diagnosed T-Large Granular Lymphocytic Leukemia (T-LGLL), hypothyroidism, RA and HTN presented with fever, fatigue and constipation for the past two days.  In the ED patient with tmax of 103. Labs notable for neutropenia and transaminitis. CTA/P with  sigmoid diverticulitis and  low density collection in the wall of the proximal sigmoid colon measuring 1.6 x 1.1 cm ID asked to help manage.     Antimicrobials:  Zosyn 2/13-    Assessment:  #Sepsis  #Diverticulitis  #Neutropenic fever  #History of T-LGL - ANC 30. Started on Zarxio    Recommendations: PLEASE DEFER ALL CHANGES IN PLAN UNTIL SIGNED BY ATTENDING. All recommendations are tentative pending Attending Attestation.    Loyda Moctezuma DO, PGY-4  Infectious Disease Fellow  [employer] Preferred  After 5pm/weekends call [phone]   Impression/Hospital Course:   79F with newly diagnosed T-Large Granular Lymphocytic Leukemia (T-LGLL), hypothyroidism, RA and HTN presented with fever, fatigue and constipation for the past two days.  In the ED patient with tmax of 103. Labs notable for neutropenia and transaminitis. CTA/P with  sigmoid diverticulitis and  low density collection in the wall of the proximal sigmoid colon measuring 1.6 x 1.1 cm ID asked to help manage.     Culture data:  Blood culture (-)  Urine culture (-)    Antimicrobials:  Zosyn 2/13-    Assessment:  #Sepsis  #Diverticulitis with abscess  #Neutropenic fever  #History of T-LGL - ANC 30. Started on Zarxio    Recommendations:   - Continue with Zosyn  - Monitor CBC and vitals  - Zarxio per heme-onc team  - Diet per surgery team    Case discussed with attending, recommendations were made to the surgery team.     Loyda Moctezuma DO, PGY-4  Infectious Disease Fellow  [employer] Preferred  After 5pm/weekends call [phone]

## 2025-02-15 NOTE — PROGRESS NOTE ADULT - ASSESSMENT
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 She now presents with fever and constipation. Found with CT AP with acute sigmoid diverticulitis with intramural ill-defined fluid collection, likely developing intramural abscess. Patient admitted to surgery and internal medicine consulted.     # Acute sigmoid diverticulitis with abscess   - Constipation and fevers at home   - CT AP with acute sigmoid diverticulitis with intramural ill-defined fluid collection, likely developing intramural abscess.  - Neutropenia on admission   - Last BM reported 2 days prior to admission   - BM noted on admission without blood and brown   - CXR, flu/COVID and UA negative   - UCx and BCx pending   - Continue on zosyn for now   - Keep NPO and on IVF  - Pain control with tylenol vs dilaudid  - IF surgical procedure needed give age and comorbidities will need ECHO for medical clearance   - Care per surgery     # Hyponatremia  - improved with hydration   - Likely second to dehydration with fevers? , ?? SIADH  - Check urine lytes  - Continue on NS as NPO   - Monitor BMP BID   - Avoid over correction of > 6-8mmol/L in 24 hours   - Recommend renal consult     # Compensated metabolic acidosis   - HCO3 19 on gas and VBG with 7.40/28/17 on admission   - Lactate normal   - No YASMEEN noted   - Could be second to sepsis vs starvation acidosis?   - Trend VBG with IVF rehydration     # Transaminitis   - ALKPHOS, AST and ALT elevated on admission and trending down   - CT AP with liver showing few granulomas, but bile duct and GB normal   - Hepatitis panel last admission negative   - Likely from  sepsis with cholestasis?  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   - Patient planned to follow up with [facility] Dr Morales, however has not done yet.   - Given acute infection with neutropenic recc hemeONC consult   - Trend CBC   - Check ANC    # HTN and HLD   - Continue on norvasc 5   - Monitor BP     # RA   - Seen by Rheum last admission   - Positive RF and CCP  - Positive serologies but lacks joint symptoms  - Supportive care     # DVT PPX with LVX

## 2025-02-16 LAB
ADD ON TEST-SPECIMEN IN LAB: SIGNIFICANT CHANGE UP
ALBUMIN SERPL ELPH-MCNC: 3 G/DL — LOW (ref 3.3–5)
ALP SERPL-CCNC: 290 U/L — HIGH (ref 40–120)
ALT FLD-CCNC: 46 U/L — HIGH (ref 10–45)
ANION GAP SERPL CALC-SCNC: 14 MMOL/L — SIGNIFICANT CHANGE UP (ref 5–17)
AST SERPL-CCNC: 33 U/L — SIGNIFICANT CHANGE UP (ref 10–40)
BASOPHILS # BLD AUTO: 0.04 K/UL — SIGNIFICANT CHANGE UP (ref 0–0.2)
BASOPHILS NFR BLD AUTO: 2.4 % — HIGH (ref 0–2)
BILIRUB SERPL-MCNC: 0.6 MG/DL — SIGNIFICANT CHANGE UP (ref 0.2–1.2)
BUN SERPL-MCNC: 7 MG/DL — SIGNIFICANT CHANGE UP (ref 7–23)
CALCIUM SERPL-MCNC: 9 MG/DL — SIGNIFICANT CHANGE UP (ref 8.4–10.5)
CHLORIDE SERPL-SCNC: 101 MMOL/L — SIGNIFICANT CHANGE UP (ref 96–108)
CO2 SERPL-SCNC: 21 MMOL/L — LOW (ref 22–31)
CREAT SERPL-MCNC: 0.78 MG/DL — SIGNIFICANT CHANGE UP (ref 0.5–1.3)
EGFR: 77 ML/MIN/1.73M2 — SIGNIFICANT CHANGE UP
EOSINOPHIL # BLD AUTO: 0.02 K/UL — SIGNIFICANT CHANGE UP (ref 0–0.5)
EOSINOPHIL NFR BLD AUTO: 1.2 % — SIGNIFICANT CHANGE UP (ref 0–6)
GIANT PLATELETS BLD QL SMEAR: PRESENT — SIGNIFICANT CHANGE UP
GLUCOSE SERPL-MCNC: 89 MG/DL — SIGNIFICANT CHANGE UP (ref 70–99)
HCT VFR BLD CALC: 31.4 % — LOW (ref 34.5–45)
HGB BLD-MCNC: 10.7 G/DL — LOW (ref 11.5–15.5)
LYMPHOCYTES # BLD AUTO: 0.84 K/UL — LOW (ref 1–3.3)
LYMPHOCYTES # BLD AUTO: 57.3 % — HIGH (ref 13–44)
MAGNESIUM SERPL-MCNC: 1.8 MG/DL — SIGNIFICANT CHANGE UP (ref 1.6–2.6)
MANUAL SMEAR VERIFICATION: SIGNIFICANT CHANGE UP
MCHC RBC-ENTMCNC: 28 PG — SIGNIFICANT CHANGE UP (ref 27–34)
MCHC RBC-ENTMCNC: 34.1 G/DL — SIGNIFICANT CHANGE UP (ref 32–36)
MCV RBC AUTO: 82.2 FL — SIGNIFICANT CHANGE UP (ref 80–100)
MONOCYTES # BLD AUTO: 0.54 K/UL — SIGNIFICANT CHANGE UP (ref 0–0.9)
MONOCYTES NFR BLD AUTO: 36.6 % — HIGH (ref 2–14)
NEUTROPHILS # BLD AUTO: 0.04 K/UL — LOW (ref 1.8–7.4)
NEUTROPHILS NFR BLD AUTO: 2.5 % — LOW (ref 43–77)
NRBC # BLD: 1 /100 WBCS — HIGH (ref 0–0)
NRBC BLD AUTO-RTO: 0 /100 WBCS — SIGNIFICANT CHANGE UP (ref 0–0)
NRBC BLD-RTO: 1 /100 WBCS — HIGH (ref 0–0)
PHOSPHATE SERPL-MCNC: 2.5 MG/DL — SIGNIFICANT CHANGE UP (ref 2.5–4.5)
PLAT MORPH BLD: NORMAL — SIGNIFICANT CHANGE UP
PLATELET # BLD AUTO: 152 K/UL — SIGNIFICANT CHANGE UP (ref 150–400)
POTASSIUM SERPL-MCNC: 3.1 MMOL/L — LOW (ref 3.5–5.3)
POTASSIUM SERPL-SCNC: 3.1 MMOL/L — LOW (ref 3.5–5.3)
PROT SERPL-MCNC: 6.6 G/DL — SIGNIFICANT CHANGE UP (ref 6–8.3)
RBC # BLD: 3.82 M/UL — SIGNIFICANT CHANGE UP (ref 3.8–5.2)
RBC # FLD: 14.5 % — SIGNIFICANT CHANGE UP (ref 10.3–14.5)
RBC BLD AUTO: NORMAL — SIGNIFICANT CHANGE UP
SMUDGE CELLS # BLD: PRESENT — SIGNIFICANT CHANGE UP
SODIUM SERPL-SCNC: 136 MMOL/L — SIGNIFICANT CHANGE UP (ref 135–145)
WBC # BLD: 1.47 K/UL — LOW (ref 3.8–10.5)
WBC # FLD AUTO: 1.47 K/UL — LOW (ref 3.8–10.5)

## 2025-02-16 RX ADMIN — Medication 40 MILLIEQUIVALENT(S): at 17:17

## 2025-02-16 RX ADMIN — ENOXAPARIN SODIUM 40 MILLIGRAM(S): 100 INJECTION SUBCUTANEOUS at 13:30

## 2025-02-16 RX ADMIN — Medication 25 GRAM(S): at 06:03

## 2025-02-16 RX ADMIN — Medication 25 GRAM(S): at 22:07

## 2025-02-16 RX ADMIN — Medication 50 MILLILITER(S): at 22:07

## 2025-02-16 RX ADMIN — Medication 25 GRAM(S): at 13:28

## 2025-02-16 RX ADMIN — AMLODIPINE BESYLATE 5 MILLIGRAM(S): 10 TABLET ORAL at 06:02

## 2025-02-16 RX ADMIN — FILGRASTIM 300 MICROGRAM(S): 300 INJECTION, SOLUTION INTRAVENOUS; SUBCUTANEOUS at 13:30

## 2025-02-16 RX ADMIN — Medication 40 MILLIEQUIVALENT(S): at 13:28

## 2025-02-16 NOTE — PROGRESS NOTE ADULT - SUBJECTIVE AND OBJECTIVE BOX
Name of Patient : PAVEL LEYVA  MRN: [id number]  Date of visit: 02-16-25       Subjective: Patient seen and examined. No new events except as noted.   doing okay     REVIEW OF SYSTEMS:    CONSTITUTIONAL: No weakness, fevers or chills  EYES/ENT: No visual changes;  No vertigo or throat pain   NECK: No pain or stiffness  RESPIRATORY: No cough, wheezing, hemoptysis; No shortness of breath  CARDIOVASCULAR: No chest pain or palpitations  GASTROINTESTINAL: No abdominal or epigastric pain. No nausea, vomiting, or hematemesis; No diarrhea or constipation. No melena or hematochezia.  GENITOURINARY: No dysuria, frequency or hematuria  NEUROLOGICAL: No numbness or weakness  SKIN: No itching, burning, rashes, or lesions   All other review of systems is negative unless indicated above.    MEDICATIONS:  MEDICATIONS  (STANDING):  amLODIPine   Tablet 5 milliGRAM(s) Oral daily  enoxaparin Injectable 40 milliGRAM(s) SubCutaneous every 24 hours  filgrastim-sndz (ZARXIO) Injectable 300 MICROGram(s) SubCutaneous daily  influenza  Vaccine (HIGH DOSE) 0.5 milliLiter(s) IntraMuscular once  piperacillin/tazobactam IVPB.. 3.375 Gram(s) IV Intermittent every 8 hours  sodium chloride 0.9%. 1000 milliLiter(s) (50 mL/Hr) IV Continuous <Continuous>      PHYSICAL EXAM:  T(C): 37.9 (02-16-25 @ 21:22), Max: 37.9 (02-16-25 @ 21:22)  HR: 95 (02-16-25 @ 21:22) (72 - 95)  BP: 164/79 (02-16-25 @ 21:22) (132/64 - 170/91)  RR: 18 (02-16-25 @ 21:22) (18 - 18)  SpO2: 95% (02-16-25 @ 21:22) (94% - 97%)  Wt(kg): --  I&O's Summary    15 Feb 2025 07:01  -  16 Feb 2025 07:00  --------------------------------------------------------  IN: 2000 mL / OUT: 0 mL / NET: 2000 mL    16 Feb 2025 07:01  -  16 Feb 2025 23:35  --------------------------------------------------------  IN: 1700 mL / OUT: 0 mL / NET: 1700 mL          Appearance: Normal	  HEENT:  PERRLA   Lymphatic: No lymphadenopathy   Cardiovascular: Normal S1 S2, no JVD  Respiratory: normal effort , clear  Gastrointestinal:  Soft, Non-tender  Skin: No rashes,  warm to touch  Psychiatry:  Mood & affect appropriate  Musculuskeletal: No edema    recent labs, Imaging and EKGs personally reviewed   CODE status discussed with the patient in detail    02-15-25 @ 07:01  -  02-16-25 @ 07:00  --------------------------------------------------------  IN: 2000 mL / OUT: 0 mL / NET: 2000 mL    02-16-25 @ 07:01  -  02-16-25 @ 23:35  --------------------------------------------------------  IN: 1700 mL / OUT: 0 mL / NET: 1700 mL                          10.7   1.47  )-----------( 152      ( 16 Feb 2025 06:39 )             31.4               02-16    136  |  101  |  7   ----------------------------<  89  3.1[L]   |  21[L]  |  0.78    Ca    9.0      16 Feb 2025 06:35  Phos  2.5     02-16  Mg     1.8     02-16    TPro  6.6  /  Alb  3.0[L]  /  TBili  0.6  /  DBili  x   /  AST  33  /  ALT  46[H]  /  AlkPhos  290[H]  02-16    PT/INR - ( 15 Feb 2025 09:08 )   PT: 9.9 sec;   INR: 0.87 ratio         PTT - ( 15 Feb 2025 09:08 )  PTT:28.6 sec                   Urinalysis Basic - ( 16 Feb 2025 06:35 )    Color: x / Appearance: x / SG: x / pH: x  Gluc: 89 mg/dL / Ketone: x  / Bili: x / Urobili: x   Blood: x / Protein: x / Nitrite: x   Leuk Esterase: x / RBC: x / WBC x   Sq Epi: x / Non Sq Epi: x / Bacteria: x

## 2025-02-16 NOTE — PROGRESS NOTE ADULT - ASSESSMENT
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 In ED febrile to Tmax 103 and leukopenic, otherwise stable. Denies pain but LLQ TTP on exam. 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 In ED febrile to Tmax 103 and leukopenic, otherwise stable. Denies pain but LLQ TTP on exam. 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    Plan:  - ABX: c/w zosyn per ID  - nephro and medicine following sodium  - heme/onc following for leukemia: started on Zarxio and to continue until ANC > 3000, outpatient followup at discharge  - Pain: multimodal  - Diet: CLD    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 In ED febrile to Tmax 103 and leukopenic, otherwise stable. Denies pain but LLQ TTP on exam. 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 In ED febrile to Tmax 103 and leukopenic, otherwise stable. Denies pain but LLQ TTP on exam. 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    Plan:  - ABX: c/w zosyn per ID  - nephro and medicine following sodium  - heme/onc following for leukemia: started on Zarxio and to continue until ANC > 1500, outpatient followup at discharge  - Pain: multimodal  - Diet: adv to LRD    [facility] Surgery  [id number]

## 2025-02-16 NOTE — PROGRESS NOTE ADULT - SUBJECTIVE AND OBJECTIVE BOX
SUBJECTIVE: Pt seen at bedside during AM rounds. No o/n events, patient resting comfortably.    Vital Signs Last 24 Hrs  T(C): 36.9 (15 Feb 2025 21:05), Max: 37 (15 Feb 2025 01:51)  T(F): 98.4 (15 Feb 2025 21:05), Max: 98.6 (15 Feb 2025 01:51)  HR: 79 (15 Feb 2025 21:05) (69 - 79)  BP: 147/74 (15 Feb 2025 21:05) (121/70 - 155/82)  BP(mean): --  RR: 18 (15 Feb 2025 21:05) (18 - 18)  SpO2: 95% (15 Feb 2025 21:05) (95% - 98%)    Parameters below as of 15 Feb 2025 21:05  Patient On (Oxygen Delivery Method): room air        I&O's Summary    14 Feb 2025 07:01  -  15 Feb 2025 07:00  --------------------------------------------------------  IN: 1400 mL / OUT: 0 mL / NET: 1400 mL    15 Feb 2025 07:01  -  16 Feb 2025 00:29  --------------------------------------------------------  IN: 360 mL / OUT: 0 mL / NET: 360 mL        LABS:                        11.5   1.71  )-----------( 158      ( 15 Feb 2025 09:08 )             35.1     02-15    133[L]  |  101  |  12  ----------------------------<  74  3.7   |  17[L]  |  0.82    Ca    9.7      15 Feb 2025 09:08  Phos  2.2     02-15  Mg     2.1     02-15    TPro  7.3  /  Alb  3.2[L]  /  TBili  0.6  /  DBili  0.3  /  AST  46[H]  /  ALT  62[H]  /  AlkPhos  337[H]  02-15    PT/INR - ( 15 Feb 2025 09:08 )   PT: 9.9 sec;   INR: 0.87 ratio         PTT - ( 15 Feb 2025 09:08 )  PTT:28.6 sec  Urinalysis Basic - ( 15 Feb 2025 09:08 )    Color: x / Appearance: x / SG: x / pH: x  Gluc: 74 mg/dL / Ketone: x  / Bili: x / Urobili: x   Blood: x / Protein: x / Nitrite: x   Leuk Esterase: x / RBC: x / WBC x   Sq Epi: x / Non Sq Epi: x / Bacteria: x        CONSTITUTIONAL: Well groomed, NAD  HEENT: NC/AT, no scleral icterus  RESP: Nonlabored breathing on RA  CV: Hemodynamically stable  GI: Soft, mildly distended, mild LLQ TTP. No rebound/guarding  MSK: Grossly symmetric WWP  NEURO: CN II-XII intact; sensation intact in upper and lower extremities b/l   PSYCH: Awake, A+O x 3, calm         SUBJECTIVE: Pt seen at bedside during AM rounds. No o/n events, patient resting comfortably. Abd pain improving. Tolerating CLD, no n/v.     Vital Signs Last 24 Hrs  T(C): 36.9 (15 Feb 2025 21:05), Max: 37 (15 Feb 2025 01:51)  T(F): 98.4 (15 Feb 2025 21:05), Max: 98.6 (15 Feb 2025 01:51)  HR: 79 (15 Feb 2025 21:05) (69 - 79)  BP: 147/74 (15 Feb 2025 21:05) (121/70 - 155/82)  BP(mean): --  RR: 18 (15 Feb 2025 21:05) (18 - 18)  SpO2: 95% (15 Feb 2025 21:05) (95% - 98%)    Parameters below as of 15 Feb 2025 21:05  Patient On (Oxygen Delivery Method): room air        I&O's Summary    14 Feb 2025 07:01  -  15 Feb 2025 07:00  --------------------------------------------------------  IN: 1400 mL / OUT: 0 mL / NET: 1400 mL    15 Feb 2025 07:01  -  16 Feb 2025 00:29  --------------------------------------------------------  IN: 360 mL / OUT: 0 mL / NET: 360 mL        LABS:                        11.5   1.71  )-----------( 158      ( 15 Feb 2025 09:08 )             35.1     02-15    133[L]  |  101  |  12  ----------------------------<  74  3.7   |  17[L]  |  0.82    Ca    9.7      15 Feb 2025 09:08  Phos  2.2     02-15  Mg     2.1     02-15    TPro  7.3  /  Alb  3.2[L]  /  TBili  0.6  /  DBili  0.3  /  AST  46[H]  /  ALT  62[H]  /  AlkPhos  337[H]  02-15    PT/INR - ( 15 Feb 2025 09:08 )   PT: 9.9 sec;   INR: 0.87 ratio         PTT - ( 15 Feb 2025 09:08 )  PTT:28.6 sec  Urinalysis Basic - ( 15 Feb 2025 09:08 )    Color: x / Appearance: x / SG: x / pH: x  Gluc: 74 mg/dL / Ketone: x  / Bili: x / Urobili: x   Blood: x / Protein: x / Nitrite: x   Leuk Esterase: x / RBC: x / WBC x   Sq Epi: x / Non Sq Epi: x / Bacteria: x        CONSTITUTIONAL: Well groomed, NAD  HEENT: NC/AT, no scleral icterus  RESP: Nonlabored breathing on RA  CV: Hemodynamically stable  GI: Soft, mildly distended, mild LLQ TTP. No rebound/guarding  MSK: Grossly symmetric WWP  NEURO: CN II-XII intact; sensation intact in upper and lower extremities b/l   PSYCH: Awake, A+O x 3, calm

## 2025-02-17 LAB
ALBUMIN SERPL ELPH-MCNC: 3.2 G/DL — LOW (ref 3.3–5)
ALP SERPL-CCNC: 338 U/L — HIGH (ref 40–120)
ALT FLD-CCNC: 41 U/L — SIGNIFICANT CHANGE UP (ref 10–45)
ANION GAP SERPL CALC-SCNC: 15 MMOL/L — SIGNIFICANT CHANGE UP (ref 5–17)
AST SERPL-CCNC: 40 U/L — SIGNIFICANT CHANGE UP (ref 10–40)
BASOPHILS # BLD AUTO: 0.03 K/UL — SIGNIFICANT CHANGE UP (ref 0–0.2)
BASOPHILS NFR BLD AUTO: 1.6 % — SIGNIFICANT CHANGE UP (ref 0–2)
BILIRUB SERPL-MCNC: 0.6 MG/DL — SIGNIFICANT CHANGE UP (ref 0.2–1.2)
BUN SERPL-MCNC: 8 MG/DL — SIGNIFICANT CHANGE UP (ref 7–23)
CALCIUM SERPL-MCNC: 9.6 MG/DL — SIGNIFICANT CHANGE UP (ref 8.4–10.5)
CHLORIDE SERPL-SCNC: 99 MMOL/L — SIGNIFICANT CHANGE UP (ref 96–108)
CO2 SERPL-SCNC: 21 MMOL/L — LOW (ref 22–31)
CREAT SERPL-MCNC: 0.88 MG/DL — SIGNIFICANT CHANGE UP (ref 0.5–1.3)
EGFR: 66 ML/MIN/1.73M2 — SIGNIFICANT CHANGE UP
EOSINOPHIL # BLD AUTO: 0.01 K/UL — SIGNIFICANT CHANGE UP (ref 0–0.5)
EOSINOPHIL NFR BLD AUTO: 0.5 % — SIGNIFICANT CHANGE UP (ref 0–6)
FLUAV AG NPH QL: SIGNIFICANT CHANGE UP
FLUBV AG NPH QL: SIGNIFICANT CHANGE UP
GLUCOSE SERPL-MCNC: 108 MG/DL — HIGH (ref 70–99)
HCT VFR BLD CALC: 36.6 % — SIGNIFICANT CHANGE UP (ref 34.5–45)
HGB BLD-MCNC: 11.9 G/DL — SIGNIFICANT CHANGE UP (ref 11.5–15.5)
IGA FLD-MCNC: 377 MG/DL — SIGNIFICANT CHANGE UP (ref 84–499)
IGG FLD-MCNC: 1552 MG/DL — SIGNIFICANT CHANGE UP (ref 610–1660)
IGM SERPL-MCNC: 92 MG/DL — SIGNIFICANT CHANGE UP (ref 35–242)
KAPPA LC SER QL IFE: 3.67 MG/DL — HIGH (ref 0.33–1.94)
KAPPA/LAMBDA FREE LIGHT CHAIN RATIO, SERUM: 0.95 RATIO — SIGNIFICANT CHANGE UP (ref 0.26–1.65)
LAMBDA LC SER QL IFE: 3.85 MG/DL — HIGH (ref 0.57–2.63)
LYMPHOCYTES # BLD AUTO: 1.09 K/UL — SIGNIFICANT CHANGE UP (ref 1–3.3)
LYMPHOCYTES # BLD AUTO: 59.2 % — HIGH (ref 13–44)
MAGNESIUM SERPL-MCNC: 1.8 MG/DL — SIGNIFICANT CHANGE UP (ref 1.6–2.6)
MCHC RBC-ENTMCNC: 26.6 PG — LOW (ref 27–34)
MCHC RBC-ENTMCNC: 32.5 G/DL — SIGNIFICANT CHANGE UP (ref 32–36)
MCV RBC AUTO: 81.7 FL — SIGNIFICANT CHANGE UP (ref 80–100)
MONOCYTES # BLD AUTO: 0.46 K/UL — SIGNIFICANT CHANGE UP (ref 0–0.9)
MONOCYTES NFR BLD AUTO: 25 % — HIGH (ref 2–14)
NEUTROPHILS # BLD AUTO: 0.25 K/UL — LOW (ref 1.8–7.4)
NEUTROPHILS NFR BLD AUTO: 13.7 % — LOW (ref 43–77)
NRBC BLD AUTO-RTO: 0 /100 WBCS — SIGNIFICANT CHANGE UP (ref 0–0)
PHOSPHATE SERPL-MCNC: 2 MG/DL — LOW (ref 2.5–4.5)
PLATELET # BLD AUTO: 166 K/UL — SIGNIFICANT CHANGE UP (ref 150–400)
POTASSIUM SERPL-MCNC: 3.8 MMOL/L — SIGNIFICANT CHANGE UP (ref 3.5–5.3)
POTASSIUM SERPL-SCNC: 3.8 MMOL/L — SIGNIFICANT CHANGE UP (ref 3.5–5.3)
PROT SERPL-MCNC: 7.1 G/DL — SIGNIFICANT CHANGE UP (ref 6–8.3)
RBC # BLD: 4.48 M/UL — SIGNIFICANT CHANGE UP (ref 3.8–5.2)
RBC # FLD: 14.3 % — SIGNIFICANT CHANGE UP (ref 10.3–14.5)
RSV RNA NPH QL NAA+NON-PROBE: SIGNIFICANT CHANGE UP
SARS-COV-2 RNA SPEC QL NAA+PROBE: SIGNIFICANT CHANGE UP
SODIUM SERPL-SCNC: 135 MMOL/L — SIGNIFICANT CHANGE UP (ref 135–145)
WBC # BLD: 1.84 K/UL — LOW (ref 3.8–10.5)
WBC # FLD AUTO: 1.84 K/UL — LOW (ref 3.8–10.5)

## 2025-02-17 PROCEDURE — 99233 SBSQ HOSP IP/OBS HIGH 50: CPT

## 2025-02-17 PROCEDURE — G0545: CPT

## 2025-02-17 RX ORDER — MAGNESIUM SULFATE 500 MG/ML
2 SYRINGE (ML) INJECTION ONCE
Refills: 0 | Status: COMPLETED | OUTPATIENT
Start: 2025-02-17 | End: 2025-02-17

## 2025-02-17 RX ORDER — SOD PHOS DI, MONO/K PHOS MONO 250 MG
2 TABLET ORAL ONCE
Refills: 0 | Status: COMPLETED | OUTPATIENT
Start: 2025-02-17 | End: 2025-02-17

## 2025-02-17 RX ORDER — POLYETHYLENE GLYCOL 3350 17 G/17G
17 POWDER, FOR SOLUTION ORAL DAILY
Refills: 0 | Status: DISCONTINUED | OUTPATIENT
Start: 2025-02-17 | End: 2025-02-18

## 2025-02-17 RX ADMIN — FILGRASTIM 300 MICROGRAM(S): 300 INJECTION, SOLUTION INTRAVENOUS; SUBCUTANEOUS at 12:43

## 2025-02-17 RX ADMIN — ENOXAPARIN SODIUM 40 MILLIGRAM(S): 100 INJECTION SUBCUTANEOUS at 12:48

## 2025-02-17 RX ADMIN — Medication 25 GRAM(S): at 06:18

## 2025-02-17 RX ADMIN — Medication 2 PACKET(S): at 09:27

## 2025-02-17 RX ADMIN — Medication 2 PACKET(S): at 12:48

## 2025-02-17 RX ADMIN — Medication 25 GRAM(S): at 21:29

## 2025-02-17 RX ADMIN — Medication 25 GRAM(S): at 09:26

## 2025-02-17 RX ADMIN — AMLODIPINE BESYLATE 5 MILLIGRAM(S): 10 TABLET ORAL at 06:18

## 2025-02-17 RX ADMIN — Medication 25 GRAM(S): at 14:40

## 2025-02-17 NOTE — PROGRESS NOTE ADULT - ASSESSMENT
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 She now presents with fever and constipation. Found with CT AP with acute sigmoid diverticulitis with intramural ill-defined fluid collection, likely developing intramural abscess. Patient admitted to surgery and internal medicine consulted.       # Acute sigmoid diverticulitis with abscess   - Constipation and fevers at home   - CT AP with acute sigmoid diverticulitis with intramural ill-defined fluid collection, likely developing intramural abscess.  - Neutropenia on admission   - BM noted on admission without blood and brown   - CXR, Flu/COVID and UA negative   - UCx and BCx - NGTD; F/u final    - Continue on zosyn for now as per ID   - Was NPO and on IVF, now on low fiber. Monitor for GI function  - Low grade temp overnight to 100.3. Monitor  --> If recurrent fever, would consider repeat imaging to assess developing abscess, Pan cultures, RVP   - Pain control   - IF surgical procedure needed give age and comorbidities will need ECHO for medical clearance   - Care per surgery   - Outpatient GI follow up for ? Colonoscopy once cleared from surgical standpoint     # Hyponatremia  - improved with hydration   - Likely second to dehydration with fevers? , ?? SIADH  - Check urine lytes  - Continue on NS as NPO   - Monitor BMP BID   - Avoid over correction of > 6-8mmol/L in 24 hours   - Recommend renal consult     # Compensated metabolic acidosis   - HCO3 19 on gas and VBG with 7.40/28/17 on admission   - Lactate normal   - No YASMEEN noted   - Could be second to sepsis vs starvation acidosis?   - Trend VBG with IVF rehydration     # Transaminitis   - ALKPHOS, AST and ALT elevated on admission and trending down   - CT AP with liver showing few granulomas, but bile duct and GB normal   - Hepatitis panel last admission negative   - Likely from  sepsis with cholestasis?  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   - Patient planned to follow up with [facility] Dr Morales, however has not done yet.   - Given acute infection with neutropenic   - Trend CBC   - Check ANC   - On Zarxio per Heme/Onc. Monitor counts     # HTN and HLD   - Continue on norvasc 5   - Monitor BP     # RA   - Seen by Rheum last admission   - Positive RF and CCP  - Positive serologies but lacks joint symptoms  - Supportive care     # Electrolyte dyscrasias  - Monitor and replete electrolytes PRN   - Daily labs    # DVT PPX with LVX    Discussed with Son at the bedside.   Discussed with Attending.

## 2025-02-17 NOTE — PROGRESS NOTE ADULT - SUBJECTIVE AND OBJECTIVE BOX
Name of Patient : PAVEL LEYVA  MRN: [id number]  Date of visit: 02-17-25 @ 14:32      Subjective: Patient seen and examined. No new events except as noted.   Patient seen earlier this Am. Son at the bedside providing translation. Low grade temp of 100.3 overnight   No BM. Reports that she had some LLQ pain yesterday that has resolved. Non-tender on exam   RN at the bedside - receiving electrolyte supplementation     REVIEW OF SYSTEMS:    CONSTITUTIONAL: + Generalized weakness; +  Low grade temp of 100.3 overnight   EYES/ENT: No visual changes;  No vertigo or throat pain   NECK: No pain or stiffness  RESPIRATORY: No cough, wheezing, hemoptysis; No shortness of breath  CARDIOVASCULAR: No chest pain or palpitations  GASTROINTESTINAL: No abdominal or epigastric pain at time of encounter; No BM   GENITOURINARY: No dysuria, frequency or hematuria  NEUROLOGICAL: No numbness or weakness  SKIN: No itching, burning, rashes, or lesions   All other review of systems is negative unless indicated above.    MEDICATIONS:  MEDICATIONS  (STANDING):  amLODIPine   Tablet 5 milliGRAM(s) Oral daily  enoxaparin Injectable 40 milliGRAM(s) SubCutaneous every 24 hours  influenza  Vaccine (HIGH DOSE) 0.5 milliLiter(s) IntraMuscular once  piperacillin/tazobactam IVPB.. 3.375 Gram(s) IV Intermittent every 8 hours      PHYSICAL EXAM:  T(C): 36.6 (02-17-25 @ 14:06), Max: 37.9 (02-16-25 @ 21:22)  HR: 80 (02-17-25 @ 14:06) (74 - 95)  BP: 148/74 (02-17-25 @ 14:06) (146/75 - 170/91)  RR: 18 (02-17-25 @ 14:06) (18 - 18)  SpO2: 97% (02-17-25 @ 14:06) (95% - 97%)  Wt(kg): --  I&O's Summary    16 Feb 2025 07:01  -  17 Feb 2025 07:00  --------------------------------------------------------  IN: 2540 mL / OUT: 0 mL / NET: 2540 mL    17 Feb 2025 07:01  -  17 Feb 2025 14:32  --------------------------------------------------------  IN: 350 mL / OUT: 0 mL / NET: 350 mL          Appearance: Awake, generalized weakness, sitting OOB TC 	  HEENT: Eyes are open   Lymphatic: No lymphadenopathy grossly   Cardiovascular: Normal   Respiratory: normal effort on RA, clear  Gastrointestinal:  Soft, Non-tender to palpitation   Skin: No rashes,  warm to touch  Psychiatry:  Mood & affect appropriate  Musculoskeletal: No edema          02-16-25 @ 07:01  -  02-17-25 @ 07:00  --------------------------------------------------------  IN: 2540 mL / OUT: 0 mL / NET: 2540 mL    02-17-25 @ 07:01  -  02-17-25 @ 14:32  --------------------------------------------------------  IN: 350 mL / OUT: 0 mL / NET: 350 mL                            11.9   1.84  )-----------( 166      ( 17 Feb 2025 06:16 )             36.6               02-17    135  |  99  |  8   ----------------------------<  108[H]  3.8   |  21[L]  |  0.88    Ca    9.6      17 Feb 2025 06:16  Phos  2.0     02-17  Mg     1.8     02-17    TPro  7.1  /  Alb  3.2[L]  /  TBili  0.6  /  DBili  x   /  AST  40  /  ALT  41  /  AlkPhos  338[H]  02-17                       Urinalysis Basic - ( 17 Feb 2025 06:16 )    Color: x / Appearance: x / SG: x / pH: x  Gluc: 108 mg/dL / Ketone: x  / Bili: x / Urobili: x   Blood: x / Protein: x / Nitrite: x   Leuk Esterase: x / RBC: x / WBC x   Sq Epi: x / Non Sq Epi: x / Bacteria: x          < from: CT Abdomen and Pelvis w/ IV Cont (02.13.25 @ 22:06) >  IMPRESSION:  Acute sigmoid diverticulitis with intramural ill-defined fluid   collection, likely developing intramural abscess.    < end of copied text >      Culture - Urine (02.14.25 @ 00:34)   Specimen Source: Clean Catch Clean Catch (Midstream)  Culture Results: <10,000 CFU/mL Normal Urogenital Giovanna    Culture - Blood (02.13.25 @ 18:50)   Specimen Source: .Blood BLOOD  Culture Results: No growth at 72 Hours    Culture - Blood (02.13.25 @ 17:45)   Specimen Source: .Blood BLOOD  Culture Results: No growth at 72 Hours

## 2025-02-17 NOTE — PROGRESS NOTE ADULT - ASSESSMENT
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  In the ED patient with tmax of 103. Labs notable for neutropenia and transaminitis. CTA/P with  sigmoid diverticulitis and  low density collection in the wall of the proximal sigmoid colon measuring 1.6 x 1.1 cm ID asked to help manage.     #Sepsis  #Diverticulitis with small abscess  #Neutropenia   #History of T-LGL - ANC 30. Started on Zarxio    Recommendations:   -Low grade fever overnight, Send repeat RVP   -Continue with Zosyn for now   -If spikes fever again or becomes unstable would send repeat blood Cx and get repeat CT AP  -Zarxio per heme-onc team  -Diet per surgery team    Discussed with surgery team     Priya Rivera MD  ID physician  [employer] Preferred  After 5pm/weekends call [phone] .

## 2025-02-17 NOTE — PROGRESS NOTE ADULT - SUBJECTIVE AND OBJECTIVE BOX
GENERAL SURGERY PROGRESS NOTE    PAVEL LEYVA  |  [id number]      S: ALEKSEY jett LRD w no concerns     O:   Vital Signs Last 24 Hrs  T(C): 37.2 (16 Feb 2025 22:05), Max: 37.9 (16 Feb 2025 21:22)  T(F): 98.9 (16 Feb 2025 22:05), Max: 100.3 (16 Feb 2025 21:22)  HR: 95 (16 Feb 2025 21:22) (72 - 95)  BP: 164/79 (16 Feb 2025 21:22) (132/64 - 170/91)  BP(mean): --  RR: 18 (16 Feb 2025 21:22) (18 - 18)  SpO2: 95% (16 Feb 2025 21:22) (94% - 97%)    Parameters below as of 16 Feb 2025 21:22  Patient On (Oxygen Delivery Method): room air                            10.7   1.47  )-----------( 152      ( 16 Feb 2025 06:39 )             31.4     02-16    136  |  101  |  7   ----------------------------<  89  3.1[L]   |  21[L]  |  0.78    Ca    9.0      16 Feb 2025 06:35  Phos  2.5     02-16  Mg     1.8     02-16    TPro  6.6  /  Alb  3.0[L]  /  TBili  0.6  /  DBili  x   /  AST  33  /  ALT  46[H]  /  AlkPhos  290[H]  02-16        02-15-25 @ 07:01 - 02-16-25 @ 07:00  --------------------------------------------------------  IN: 2000 mL / OUT: 0 mL / NET: 2000 mL    02-16-25 @ 07:01  -  02-17-25 @ 00:43  --------------------------------------------------------  IN: 1700 mL / OUT: 0 mL / NET: 1700 mL      CONSTITUTIONAL: Well groomed, NAD  HEENT: NC/AT, no scleral icterus  RESP: Nonlabored breathing on RA  CV: Hemodynamically stable  GI: Soft, mildly distended, mild LLQ TTP. 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 In ED febrile to Tmax 103 and leukopenic, otherwise stable. Denies pain but LLQ TTP on exam. 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    Plan:  - ABX: c/w zosyn per ID  - nephro and medicine following sodium  - heme/onc following for leukemia: started on Zarxio and to continue until ANC > 1500, outpatient followup at discharge  - Pain: multimodal  - Diet: LRD    [facility] Surgery  [id number] GENERAL SURGERY PROGRESS NOTE    PAVEL LEYVA  |  [id number]      S: ALEKSEY jett LRD w no concerns     O:   Vital Signs Last 24 Hrs  T(C): 37.2 (16 Feb 2025 22:05), Max: 37.9 (16 Feb 2025 21:22)  T(F): 98.9 (16 Feb 2025 22:05), Max: 100.3 (16 Feb 2025 21:22)  HR: 95 (16 Feb 2025 21:22) (72 - 95)  BP: 164/79 (16 Feb 2025 21:22) (132/64 - 170/91)  BP(mean): --  RR: 18 (16 Feb 2025 21:22) (18 - 18)  SpO2: 95% (16 Feb 2025 21:22) (94% - 97%)    Parameters below as of 16 Feb 2025 21:22  Patient On (Oxygen Delivery Method): room air                            10.7   1.47  )-----------( 152      ( 16 Feb 2025 06:39 )             31.4     02-16    136  |  101  |  7   ----------------------------<  89  3.1[L]   |  21[L]  |  0.78    Ca    9.0      16 Feb 2025 06:35  Phos  2.5     02-16  Mg     1.8     02-16    TPro  6.6  /  Alb  3.0[L]  /  TBili  0.6  /  DBili  x   /  AST  33  /  ALT  46[H]  /  AlkPhos  290[H]  02-16        02-15-25 @ 07:01 - 02-16-25 @ 07:00  --------------------------------------------------------  IN: 2000 mL / OUT: 0 mL / NET: 2000 mL    02-16-25 @ 07:01  -  02-17-25 @ 00:43  --------------------------------------------------------  IN: 1700 mL / OUT: 0 mL / NET: 1700 mL      CONSTITUTIONAL: Well groomed, NAD  HEENT: NC/AT, no scleral icterus  RESP: Nonlabored breathing on RA  CV: Hemodynamically stable  GI: Soft, mildly distended, mild LLQ TTP. 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 In ED febrile to Tmax 103 and leukopenic, otherwise stable. Denies pain but LLQ TTP on exam. 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    Plan:  - ABX: c/w zosyn per ID  - hyponatremia resolving, appreciate medicine and nephro recs  - heme/onc following for leukemia: started on Zarxio and to continue until ANC > 1500, outpatient followup at discharge  - Pain: multimodal  - Diet: LRD    [facility] Surgery  [id number] GENERAL SURGERY PROGRESS NOTE    PAVEL LEYVA  |  [id number]      S: ALEKSEY jett LRD w no concerns     O:   Vital Signs Last 24 Hrs  T(C): 37.2 (16 Feb 2025 22:05), Max: 37.9 (16 Feb 2025 21:22)  T(F): 98.9 (16 Feb 2025 22:05), Max: 100.3 (16 Feb 2025 21:22)  HR: 95 (16 Feb 2025 21:22) (72 - 95)  BP: 164/79 (16 Feb 2025 21:22) (132/64 - 170/91)  BP(mean): --  RR: 18 (16 Feb 2025 21:22) (18 - 18)  SpO2: 95% (16 Feb 2025 21:22) (94% - 97%)    Parameters below as of 16 Feb 2025 21:22  Patient On (Oxygen Delivery Method): room air                            10.7   1.47  )-----------( 152      ( 16 Feb 2025 06:39 )             31.4     02-16    136  |  101  |  7   ----------------------------<  89  3.1[L]   |  21[L]  |  0.78    Ca    9.0      16 Feb 2025 06:35  Phos  2.5     02-16  Mg     1.8     02-16    TPro  6.6  /  Alb  3.0[L]  /  TBili  0.6  /  DBili  x   /  AST  33  /  ALT  46[H]  /  AlkPhos  290[H]  02-16        02-15-25 @ 07:01 - 02-16-25 @ 07:00  --------------------------------------------------------  IN: 2000 mL / OUT: 0 mL / NET: 2000 mL    02-16-25 @ 07:01  -  02-17-25 @ 00:43  --------------------------------------------------------  IN: 1700 mL / OUT: 0 mL / NET: 1700 mL      CONSTITUTIONAL: Well groomed, NAD  HEENT: NC/AT, no scleral icterus  RESP: Nonlabored breathing on RA  CV: Hemodynamically stable  GI: Soft, mildly distended, mild LLQ TTP. 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 In ED febrile to Tmax 103 and leukopenic, otherwise stable. Denies pain but LLQ TTP on exam. 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    Plan:  - ABX: c/w zosyn per ID, f/u RVP >> will consider repeat scan if abd pain worsens/spikes fever   - hyponatremia resolving, appreciate medicine and nephro recs  - heme/onc following for leukemia: started on Zarxio and to continue until ANC > 1500, outpatient followup at discharge  - Pain: multimodal  - Diet: LRD    [facility] [place] Surgery  [id number]

## 2025-02-17 NOTE — PROGRESS NOTE ADULT - SUBJECTIVE AND OBJECTIVE BOX
Follow Up:      Interval History/ROS:Patient is a 79y old  Female who presents with a chief complaint of diverticulitis (17 Feb 2025 00:43)  Son at bedside, low grade fever overnight to 100.3F, felt lower abdominal pain, now resolved.     Allergies  No Known Allergies    ANTIMICROBIALS:    piperacillin/tazobactam IVPB.. 3.375 every 8 hours    OTHER MEDS: MEDICATIONS  (STANDING):  acetaminophen     Tablet .. 975 every 6 hours PRN  amLODIPine   Tablet 5 daily  enoxaparin Injectable 40 every 24 hours  HYDROmorphone  Injectable 0.2 every 4 hours PRN  HYDROmorphone  Injectable 0.5 every 4 hours PRN  influenza  Vaccine (HIGH DOSE) 0.5 once  ondansetron Injectable 4 every 8 hours PRN      Vital Signs Last 24 Hrs  T(F): 97.4 (02-17-25 @ 11:10), Max: 103 (02-13-25 @ 22:09)    Vital Signs Last 24 Hrs  HR: 74 (02-17-25 @ 11:10) (74 - 95)  BP: 146/75 (02-17-25 @ 11:10) (146/75 - 170/91)  RR: 18 (02-17-25 @ 11:10)  SpO2: 95% (02-17-25 @ 11:10) (95% - 97%)  Wt(kg): --    EXAM:    GA: NAD  CV: nl S1/S2, no RMG  Lungs: CTAB  Abd: soft, nontender  Ext: no edema  Skin: Intact  IV: no phlebitis    Labs:                        11.9   1.84  )-----------( 166      ( 17 Feb 2025 06:16 )             36.6     02-17    135  |  99  |  8   ----------------------------<  108[H]  3.8   |  21[L]  |  0.88    Ca    9.6      17 Feb 2025 06:16  Phos  2.0     02-17  Mg     1.8     02-17    TPro  7.1  /  Alb  3.2[L]  /  TBili  0.6  /  DBili  x   /  AST  40  /  ALT  41  /  AlkPhos  338[H]  02-17      WBC Trend:  WBC Count: 1.84 (02-17-25 @ 06:16)  WBC Count: 1.47 (02-16-25 @ 06:39)  WBC Count: 1.71 (02-15-25 @ 09:08)  WBC Count: 1.67 (02-13-25 @ 19:12)      Creatine Trend:  Creatinine: 0.88 (02-17)  Creatinine: 0.78 (02-16)  Creatinine: 0.82 (02-15)  Creatinine: 0.87 (02-15)      Liver Biochemical Testing Trend:  Alanine Aminotransferase (ALT/SGPT): 41 (02-17)  Alanine Aminotransferase (ALT/SGPT): 46 *H* (02-16)  Alanine Aminotransferase (ALT/SGPT): 62 *H* (02-15)  Alanine Aminotransferase (ALT/SGPT): 62 *H* (02-14)  Alanine Aminotransferase (ALT/SGPT): 88 *H* (02-13)  Aspartate Aminotransferase (AST/SGOT): 40 (02-17-25 @ 06:16)  Aspartate Aminotransferase (AST/SGOT): 33 (02-16-25 @ 06:35)  Aspartate Aminotransferase (AST/SGOT): 46 (02-15-25 @ 09:08)  Aspartate Aminotransferase (AST/SGOT): 82 (02-14-25 @ 01:55)  Aspartate Aminotransferase (AST/SGOT): 97 (02-13-25 @ 19:12)  Bilirubin Total: 0.6 (02-17)  Bilirubin Total: 0.6 (02-16)  Bilirubin Direct: 0.3 (02-15)  Bilirubin Total: 0.6 (02-15)  Bilirubin Total: 0.7 (02-14)      Trend LDH  01-06-25 @ 08:26  292[H]      Urinalysis Basic - ( 17 Feb 2025 06:16 )    Color: x / Appearance: x / SG: x / pH: x  Gluc: 108 mg/dL / Ketone: x  / Bili: x / Urobili: x   Blood: x / Protein: x / Nitrite: x   Leuk Esterase: x / RBC: x / WBC x   Sq Epi: x / Non Sq Epi: x / Bacteria: x        MICROBIOLOGY:    MRSA PCR Result.: Addison (01-06-25 @ 10:32)      Culture - Urine (collected 14 Feb 2025 00:34)  Source: Clean Catch Clean Catch (Midstream)  Final Report:    <10,000 CFU/mL Normal Urogenital Giovanna    Culture - Blood (collected 13 Feb 2025 18:50)  Source: .Blood BLOOD  Preliminary Report:    No growth at 72 Hours    Culture - Blood (collected 13 Feb 2025 17:45)  Source: .Blood BLOOD  Preliminary Report:    No growth at 72 Hours    Culture - Urine (collected 05 Jan 2025 20:36)  Source: Clean Catch Clean Catch (Midstream)  Final Report:    <10,000 CFU/mL Normal Urogenital Giovanna    Culture - Blood (collected 05 Jan 2025 17:45)  Source: .Blood BLOOD  Final Report:    No growth at 5 days    Culture - Blood (collected 05 Jan 2025 17:30)  Source: .Blood BLOOD  Final Report:    No growth at 5 days      HIV-1/2 Combo Result: Nonreact (01-06-25 @ 06:11)        RADIOLOGY:  imaging below personally reviewed        < from: CT Abdomen and Pelvis w/ IV Cont (02.13.25 @ 22:06) >  IMPRESSION:  Acute sigmoid diverticulitis with intramural ill-defined fluid   collection, likely developing intramural abscess.        --- End of Report ---      < end of copied text >

## 2025-02-18 LAB
ALBUMIN SERPL ELPH-MCNC: 3.1 G/DL — LOW (ref 3.3–5)
ALP SERPL-CCNC: 341 U/L — HIGH (ref 40–120)
ALT FLD-CCNC: 37 U/L — SIGNIFICANT CHANGE UP (ref 10–45)
ANION GAP SERPL CALC-SCNC: 13 MMOL/L — SIGNIFICANT CHANGE UP (ref 5–17)
AST SERPL-CCNC: 41 U/L — HIGH (ref 10–40)
BASOPHILS # BLD AUTO: 0 K/UL — SIGNIFICANT CHANGE UP (ref 0–0.2)
BASOPHILS NFR BLD AUTO: 0 % — SIGNIFICANT CHANGE UP (ref 0–2)
BILIRUB SERPL-MCNC: 0.6 MG/DL — SIGNIFICANT CHANGE UP (ref 0.2–1.2)
BUN SERPL-MCNC: 16 MG/DL — SIGNIFICANT CHANGE UP (ref 7–23)
CALCIUM SERPL-MCNC: 9.2 MG/DL — SIGNIFICANT CHANGE UP (ref 8.4–10.5)
CHLORIDE SERPL-SCNC: 99 MMOL/L — SIGNIFICANT CHANGE UP (ref 96–108)
CO2 SERPL-SCNC: 20 MMOL/L — LOW (ref 22–31)
CREAT SERPL-MCNC: 0.9 MG/DL — SIGNIFICANT CHANGE UP (ref 0.5–1.3)
EGFR: 65 ML/MIN/1.73M2 — SIGNIFICANT CHANGE UP
EOSINOPHIL # BLD AUTO: 0.03 K/UL — SIGNIFICANT CHANGE UP (ref 0–0.5)
EOSINOPHIL NFR BLD AUTO: 1.7 % — SIGNIFICANT CHANGE UP (ref 0–6)
GIANT PLATELETS BLD QL SMEAR: PRESENT — SIGNIFICANT CHANGE UP
GLUCOSE SERPL-MCNC: 109 MG/DL — HIGH (ref 70–99)
HCT VFR BLD CALC: 32.4 % — LOW (ref 34.5–45)
HGB BLD-MCNC: 10.5 G/DL — LOW (ref 11.5–15.5)
LYMPHOCYTES # BLD AUTO: 1.18 K/UL — SIGNIFICANT CHANGE UP (ref 1–3.3)
LYMPHOCYTES # BLD AUTO: 66.4 % — HIGH (ref 13–44)
MAGNESIUM SERPL-MCNC: 2.2 MG/DL — SIGNIFICANT CHANGE UP (ref 1.6–2.6)
MANUAL SMEAR VERIFICATION: SIGNIFICANT CHANGE UP
MCHC RBC-ENTMCNC: 26.6 PG — LOW (ref 27–34)
MCHC RBC-ENTMCNC: 32.4 G/DL — SIGNIFICANT CHANGE UP (ref 32–36)
MCV RBC AUTO: 82 FL — SIGNIFICANT CHANGE UP (ref 80–100)
MONOCYTES # BLD AUTO: 0.54 K/UL — SIGNIFICANT CHANGE UP (ref 0–0.9)
MONOCYTES NFR BLD AUTO: 30.1 % — HIGH (ref 2–14)
NEUTROPHILS # BLD AUTO: 0.02 K/UL — LOW (ref 1.8–7.4)
NEUTROPHILS NFR BLD AUTO: 0.9 % — LOW (ref 43–77)
OSMOLALITY SERPL: 284 MOSMOL/KG — SIGNIFICANT CHANGE UP (ref 280–301)
PHOSPHATE SERPL-MCNC: 2.8 MG/DL — SIGNIFICANT CHANGE UP (ref 2.5–4.5)
PLAT MORPH BLD: ABNORMAL
PLATELET # BLD AUTO: 147 K/UL — LOW (ref 150–400)
POTASSIUM SERPL-MCNC: 3.8 MMOL/L — SIGNIFICANT CHANGE UP (ref 3.5–5.3)
POTASSIUM SERPL-SCNC: 3.8 MMOL/L — SIGNIFICANT CHANGE UP (ref 3.5–5.3)
PROT SERPL-MCNC: 6.6 G/DL — SIGNIFICANT CHANGE UP (ref 6–8.3)
RBC # BLD: 3.95 M/UL — SIGNIFICANT CHANGE UP (ref 3.8–5.2)
RBC # FLD: 14.5 % — SIGNIFICANT CHANGE UP (ref 10.3–14.5)
RBC BLD AUTO: SIGNIFICANT CHANGE UP
SODIUM SERPL-SCNC: 132 MMOL/L — LOW (ref 135–145)
VARIANT LYMPHS # BLD: 0.9 % — SIGNIFICANT CHANGE UP (ref 0–6)
VARIANT LYMPHS NFR BLD MANUAL: 0.9 % — SIGNIFICANT CHANGE UP (ref 0–6)
WBC # BLD: 1.78 K/UL — LOW (ref 3.8–10.5)
WBC # FLD AUTO: 1.78 K/UL — LOW (ref 3.8–10.5)

## 2025-02-18 PROCEDURE — 99232 SBSQ HOSP IP/OBS MODERATE 35: CPT

## 2025-02-18 PROCEDURE — G0545: CPT

## 2025-02-18 PROCEDURE — 99232 SBSQ HOSP IP/OBS MODERATE 35: CPT | Mod: GC

## 2025-02-18 RX ORDER — FILGRASTIM 300 UG/.5ML
300 INJECTION, SOLUTION INTRAVENOUS; SUBCUTANEOUS DAILY
Refills: 0 | Status: DISCONTINUED | OUTPATIENT
Start: 2025-02-18 | End: 2025-02-18

## 2025-02-18 RX ORDER — FILGRASTIM 300 UG/.5ML
450 INJECTION, SOLUTION INTRAVENOUS; SUBCUTANEOUS DAILY
Refills: 0 | Status: DISCONTINUED | OUTPATIENT
Start: 2025-02-18 | End: 2025-02-18

## 2025-02-18 RX ORDER — FILGRASTIM 300 UG/.5ML
480 INJECTION, SOLUTION INTRAVENOUS; SUBCUTANEOUS DAILY
Refills: 0 | Status: DISCONTINUED | OUTPATIENT
Start: 2025-02-18 | End: 2025-02-19

## 2025-02-18 RX ADMIN — Medication 25 GRAM(S): at 05:16

## 2025-02-18 RX ADMIN — AMLODIPINE BESYLATE 5 MILLIGRAM(S): 10 TABLET ORAL at 05:15

## 2025-02-18 RX ADMIN — FILGRASTIM 480 MICROGRAM(S): 300 INJECTION, SOLUTION INTRAVENOUS; SUBCUTANEOUS at 16:18

## 2025-02-18 RX ADMIN — Medication 25 GRAM(S): at 13:53

## 2025-02-18 RX ADMIN — ENOXAPARIN SODIUM 40 MILLIGRAM(S): 100 INJECTION SUBCUTANEOUS at 13:53

## 2025-02-18 RX ADMIN — Medication 25 GRAM(S): at 21:22

## 2025-02-18 NOTE — PROGRESS NOTE ADULT - SUBJECTIVE AND OBJECTIVE BOX
GENERAL SURGERY PROGRESS NOTE    PAVEL LEYVA  |  [id number]      S: NAEON.     O:   Vital Signs Last 24 Hrs  T(C): 37.1 (17 Feb 2025 21:30), Max: 37.1 (17 Feb 2025 21:30)  T(F): 98.8 (17 Feb 2025 21:30), Max: 98.8 (17 Feb 2025 21:30)  HR: 83 (17 Feb 2025 21:30) (74 - 85)  BP: 146/72 (17 Feb 2025 21:30) (146/72 - 157/79)  BP(mean): --  RR: 18 (17 Feb 2025 21:30) (18 - 18)  SpO2: 97% (17 Feb 2025 21:30) (95% - 97%)    Parameters below as of 17 Feb 2025 21:30  Patient On (Oxygen Delivery Method): room air                            11.9   1.84  )-----------( 166      ( 17 Feb 2025 06:16 )             36.6     02-17    135  |  99  |  8   ----------------------------<  108[H]  3.8   |  21[L]  |  0.88    Ca    9.6      17 Feb 2025 06:16  Phos  2.0     02-17  Mg     1.8     02-17    TPro  7.1  /  Alb  3.2[L]  /  TBili  0.6  /  DBili  x   /  AST  40  /  ALT  41  /  AlkPhos  338[H]  02-17 02-16-25 @ 07:01  -  02-17-25 @ 07:00  --------------------------------------------------------  IN: 2540 mL / OUT: 0 mL / NET: 2540 mL    02-17-25 @ 07:01  -  02-18-25 @ 02:31  --------------------------------------------------------  IN: 1010 mL / OUT: 0 mL / NET: 1010 mL        PHYSICAL EXAM:  CONSTITUTIONAL: Well groomed, NAD  HEENT: NC/AT, no scleral icterus  RESP: Nonlabored breathing on RA  CV: Hemodynamically stable  GI: Soft, mildly distended, mild LLQ TTP. 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 In ED febrile to Tmax 103 and leukopenic, otherwise stable. Denies pain but LLQ TTP on exam. 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    Plan:  - ABX: c/w zosyn per ID, f/u RVP >> will consider repeat scan if abd pain worsens/spikes fever   - hyponatremia resolving, appreciate medicine and nephro recs  - heme/onc following for leukemia: started on Zarxio and to continue until ANC > 1500, outpatient followup at discharge  - Pain: multimodal  - Diet: LRD    [facility]  [id number]   GENERAL SURGERY PROGRESS NOTE    PAVEL LEYVA  |  [id number]      S: NAEON. Pt denies abd pain, +diarrhea, jett diet    O:   Vital Signs Last 24 Hrs  T(C): 37.1 (17 Feb 2025 21:30), Max: 37.1 (17 Feb 2025 21:30)  T(F): 98.8 (17 Feb 2025 21:30), Max: 98.8 (17 Feb 2025 21:30)  HR: 83 (17 Feb 2025 21:30) (74 - 85)  BP: 146/72 (17 Feb 2025 21:30) (146/72 - 157/79)  BP(mean): --  RR: 18 (17 Feb 2025 21:30) (18 - 18)  SpO2: 97% (17 Feb 2025 21:30) (95% - 97%)    Parameters below as of 17 Feb 2025 21:30  Patient On (Oxygen Delivery Method): room air                            11.9   1.84  )-----------( 166      ( 17 Feb 2025 06:16 )             36.6     02-17    135  |  99  |  8   ----------------------------<  108[H]  3.8   |  21[L]  |  0.88    Ca    9.6      17 Feb 2025 06:16  Phos  2.0     02-17  Mg     1.8     02-17    TPro  7.1  /  Alb  3.2[L]  /  TBili  0.6  /  DBili  x   /  AST  40  /  ALT  41  /  AlkPhos  338[H]  02-17 02-16-25 @ 07:01  -  02-17-25 @ 07:00  --------------------------------------------------------  IN: 2540 mL / OUT: 0 mL / NET: 2540 mL    02-17-25 @ 07:01  -  02-18-25 @ 02:31  --------------------------------------------------------  IN: 1010 mL / OUT: 0 mL / NET: 1010 mL        PHYSICAL EXAM:  CONSTITUTIONAL: Well groomed, NAD  HEENT: NC/AT, no scleral icterus  RESP: Nonlabored breathing on RA  CV: Hemodynamically stable  GI: Soft, mildly distended, mild LLQ TTP. 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 In ED febrile to Tmax 103 and leukopenic, otherwise stable. Denies pain but LLQ TTP on exam. CT with acute sigmoid diverticulitis and developing intramural abscess, likely Hinchey 1b    Plan:  - ABX: c/w zosyn per ID, rpt RVP neg, if afebrile for 48h and benign abd exam, can d/c on 2 weeks augmentin >> will consider repeat scan if abd pain worsens/spikes fever   - hyponatremia resolving, appreciate medicine and nephro recs  - heme/onc following for leukemia: started on Zarxio and to continue until ANC > 1500, inc to 480 mcg today, outpatient followup at discharge  - Pain: multimodal  - Diet: LRD    [facility]  [id number]

## 2025-02-18 NOTE — PROGRESS NOTE ADULT - SUBJECTIVE AND OBJECTIVE BOX
Follow Up:      Interval History/ROS:Patient is a 79y old  Female who presents with a chief complaint of diverticulitis (18 Feb 2025 17:47)  Son at bedside, afebrile, no new abdominal pain.     Allergies  No Known Allergies        ANTIMICROBIALS:    piperacillin/tazobactam IVPB.. 3.375 every 8 hours        OTHER MEDS: MEDICATIONS  (STANDING):  acetaminophen     Tablet .. 975 every 6 hours PRN  amLODIPine   Tablet 5 daily  enoxaparin Injectable 40 every 24 hours  filgrastim-sndz (ZARXIO) Injectable 480 daily  HYDROmorphone  Injectable 0.2 every 4 hours PRN  HYDROmorphone  Injectable 0.5 every 4 hours PRN  influenza  Vaccine (HIGH DOSE) 0.5 once  ondansetron Injectable 4 every 8 hours PRN      Vital Signs Last 24 Hrs  T(F): 98.9 (02-18-25 @ 21:28), Max: 103 (02-13-25 @ 22:09)    Vital Signs Last 24 Hrs  HR: 76 (02-18-25 @ 21:28) (73 - 87)  BP: 128/67 (02-18-25 @ 21:28) (128/67 - 155/72)  RR: 18 (02-18-25 @ 21:28)  SpO2: 97% (02-18-25 @ 21:28) (95% - 98%)  Wt(kg): --    EXAM:    GA: NAD  CV: nl S1/S2  Lungs: CTAB,  Ext: no edema  Skin: Intact  IV: no phlebitis    Labs:                        10.5   1.78  )-----------( 147      ( 18 Feb 2025 07:16 )             32.4     02-18    132[L]  |  99  |  16  ----------------------------<  109[H]  3.8   |  20[L]  |  0.90    Ca    9.2      18 Feb 2025 07:16  Phos  2.8     02-18  Mg     2.2     02-18    TPro  6.6  /  Alb  3.1[L]  /  TBili  0.6  /  DBili  x   /  AST  41[H]  /  ALT  37  /  AlkPhos  341[H]  02-18      WBC Trend:  WBC Count: 1.78 (02-18-25 @ 07:16)  WBC Count: 1.84 (02-17-25 @ 06:16)  WBC Count: 1.47 (02-16-25 @ 06:39)  WBC Count: 1.71 (02-15-25 @ 09:08)      Creatine Trend:  Creatinine: 0.90 (02-18)  Creatinine: 0.88 (02-17)  Creatinine: 0.78 (02-16)  Creatinine: 0.82 (02-15)      Liver Biochemical Testing Trend:  Alanine Aminotransferase (ALT/SGPT): 37 (02-18)  Alanine Aminotransferase (ALT/SGPT): 41 (02-17)  Alanine Aminotransferase (ALT/SGPT): 46 *H* (02-16)  Alanine Aminotransferase (ALT/SGPT): 62 *H* (02-15)  Alanine Aminotransferase (ALT/SGPT): 62 *H* (02-14)  Aspartate Aminotransferase (AST/SGOT): 41 (02-18-25 @ 07:16)  Aspartate Aminotransferase (AST/SGOT): 40 (02-17-25 @ 06:16)  Aspartate Aminotransferase (AST/SGOT): 33 (02-16-25 @ 06:35)  Aspartate Aminotransferase (AST/SGOT): 46 (02-15-25 @ 09:08)  Aspartate Aminotransferase (AST/SGOT): 82 (02-14-25 @ 01:55)  Bilirubin Total: 0.6 (02-18)  Bilirubin Total: 0.6 (02-17)  Bilirubin Total: 0.6 (02-16)  Bilirubin Direct: 0.3 (02-15)  Bilirubin Total: 0.6 (02-15)      Trend LDH  01-06-25 @ 08:26  292[H]      Urinalysis Basic - ( 18 Feb 2025 07:16 )    Color: x / Appearance: x / SG: x / pH: x  Gluc: 109 mg/dL / Ketone: x  / Bili: x / Urobili: x   Blood: x / Protein: x / Nitrite: x   Leuk Esterase: x / RBC: x / WBC x   Sq Epi: x / Non Sq Epi: x / Bacteria: x        MICROBIOLOGY:    MRSA PCR Result.: Addison (01-06-25 @ 10:32)      Culture - Urine (collected 14 Feb 2025 00:34)  Source: Clean Catch Clean Catch (Midstream)  Final Report:    <10,000 CFU/mL Normal Urogenital Giovanna    Culture - Blood (collected 13 Feb 2025 18:50)  Source: .Blood BLOOD  Preliminary Report:    No growth at 4 days    Culture - Blood (collected 13 Feb 2025 17:45)  Source: .Blood BLOOD  Preliminary Report:    No growth at 4 days    Culture - Urine (collected 05 Jan 2025 20:36)  Source: Clean Catch Clean Catch (Midstream)  Final Report:    <10,000 CFU/mL Normal Urogenital Giovnana    Culture - Blood (collected 05 Jan 2025 17:45)  Source: .Blood BLOOD  Final Report:    No growth at 5 days    Culture - Blood (collected 05 Jan 2025 17:30)  Source: .Blood BLOOD  Final Report:    No growth at 5 days      HIV-1/2 Combo Result: Nonreact (01-06-25 @ 06:11)                                              RADIOLOGY:  imaging below personally reviewed    < from: CT Abdomen and Pelvis w/ IV Cont (02.13.25 @ 22:06) >    IMPRESSION:  Acute sigmoid diverticulitis with intramural ill-defined fluid   collection, likely developing intramural abscess.        --- End of Report ---    < end of copied text >

## 2025-02-18 NOTE — PROGRESS NOTE ADULT - ASSESSMENT
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  In the ED patient with tmax of 103. Labs notable for neutropenia and transaminitis. CTA/P with  sigmoid diverticulitis and  low density collection in the wall of the proximal sigmoid colon measuring 1.6 x 1.1 cm ID asked to help manage.     #Sepsis  #Diverticulitis with small abscess  #Neutropenia   #History of T-LGL - ANC 30. Started on Zarxio    Recommendations:   -Continue with Zosyn for now   -If spikes fever again or becomes unstable would send repeat blood Cx and get repeat CT AP  -When stable for discharge, plan to transition to augemntin 875 mg po bid to complete 2 weeks course   -Zarxio per heme-onc team  -Diet per surgery team    Discussed with surgery team     Priya Rivera MD  ID physician  [employer]erred  After 5pm/weekends call [phone] .

## 2025-02-18 NOTE — PROGRESS NOTE ADULT - SUBJECTIVE AND OBJECTIVE BOX
INTERNAL MEDICINE PROGRESS NOTE     NAME OF PATIENT: PAVEL LEYVA  MRN: [id number]  DATE OF VISIT: 02-18-25 @ 14:44    SUBJECTIVE/ ROS:  - Patient seen and examined by bedside   - Last fever overnight 2/16 and so far afebrile   - WBC continues to drop and neutrophil % 0.9. Continue on zarxio and hemeONC following.     OBJECTIVE:  ICU Vital Signs Last 24 Hrs  T(C): 36.8 (18 Feb 2025 13:48), Max: 37.1 (17 Feb 2025 21:30)  T(F): 98.3 (18 Feb 2025 13:48), Max: 98.8 (17 Feb 2025 21:30)  HR: 73 (18 Feb 2025 13:48) (73 - 87)  BP: 132/70 (18 Feb 2025 13:48) (128/70 - 157/79)  BP(mean): --  ABP: --  ABP(mean): --  RR: 18 (18 Feb 2025 13:48) (18 - 18)  SpO2: 97% (18 Feb 2025 13:48) (95% - 98%)    O2 Parameters below as of 18 Feb 2025 13:48  Patient On (Oxygen Delivery Method): room air          02-18-25 @ 14:44  T(C): 36.8 (02-18-25 @ 13:48), Max: 37.1 (02-17-25 @ 21:30)  HR: 73 (02-18-25 @ 13:48) (73 - 87)  BP: 132/70 (02-18-25 @ 13:48) (128/70 - 157/79)  RR: 18 (02-18-25 @ 13:48) (18 - 18)  SpO2: 97% (02-18-25 @ 13:48) (95% - 98%)  Wt(kg): --  CAPILLARY BLOOD GLUCOSE          HOSPITAL MEDICATIONS:  MEDICATIONS  (STANDING):  amLODIPine   Tablet 5 milliGRAM(s) Oral daily  enoxaparin Injectable 40 milliGRAM(s) SubCutaneous every 24 hours  filgrastim-sndz (ZARXIO) Injectable 480 MICROGram(s) SubCutaneous daily  influenza  Vaccine (HIGH DOSE) 0.5 milliLiter(s) IntraMuscular once  piperacillin/tazobactam IVPB.. 3.375 Gram(s) IV Intermittent every 8 hours    MEDICATIONS  (PRN):  acetaminophen     Tablet .. 975 milliGRAM(s) Oral every 6 hours PRN Temp greater or equal to 38C (100.4F), Mild Pain (1 - 3)  HYDROmorphone  Injectable 0.2 milliGRAM(s) IV Push every 4 hours PRN Moderate Pain (4 - 6)  HYDROmorphone  Injectable 0.5 milliGRAM(s) IV Push every 4 hours PRN Severe Pain (7 - 10)  ondansetron Injectable 4 milliGRAM(s) IV Push every 8 hours PRN Nausea and/or Vomiting      PHYSICAL EXAMINATION:  General: NAD   Cardiology: S1/S2 with no murmur   Respiratory: CTA BL with no wheeze   GI: Soft and NTND  Extremities: SIM of BL UE/LE   Neurology: Awake with no acute neurological deficits     LABS:                        10.5   1.78  )-----------( 147      ( 18 Feb 2025 07:16 )             32.4     02-18    132[L]  |  99  |  16  ----------------------------<  109[H]  3.8   |  20[L]  |  0.90    Ca    9.2      18 Feb 2025 07:16  Phos  2.8     02-18  Mg     2.2     02-18    TPro  6.6  /  Alb  3.1[L]  /  TBili  0.6  /  DBili  x   /  AST  41[H]  /  ALT  37  /  AlkPhos  341[H]  02-18          MICROBIOLOGY:     RADIOLOGY:    CARDIOLOGY:

## 2025-02-18 NOTE — PROGRESS NOTE ADULT - ASSESSMENT
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 She now presents with fever and constipation. Found with CT AP with acute sigmoid diverticulitis with intramural ill-defined fluid collection, likely developing intramural abscess. Patient admitted to surgery and internal medicine consulted.       # Acute sigmoid diverticulitis with abscess   - Constipation and fevers at home   - CT AP with acute sigmoid diverticulitis with intramural ill-defined fluid collection, likely developing intramural abscess.  - Neutropenia on admission   - BM noted on admission without blood and brown   - CXR, Flu/COVID, UCx, BCx and UA negative   - Incidental breakthrough low grade temp on 2/16 overnight, however no further temperatures and overall appears nontoxic   - IF continues to spike then reculture and RPT CT.   - Continue on zosyn (2/14 - ) for now as per ID   - Was NPO and on IVF, however advanced to low fiber and tolerating well.   - Monitor abdomen   - Monitor BMs   - Care per surgery   - Outpatient GI follow up for possible colonoscopy once cleared from surgical standpoint     # Hyponatremia likely second to dehydration with fevers vs SIADH?   - Improved with hydration   - Sodium continues to wax and wane   - Urine concentrated and previous hospitalized serum osmo low with concern for SIADH. Monitor sodium, repeat serumo osmo, and if sodium continues to fall then start on fluid restriction +/- salt tabs.   - Avoid over correction of > 6-8mmol/L in 24 hours   - Renal following.     # Compensated metabolic acidosis   - HCO3 19 on gas and VBG with 7.40/28/17 on admission   - Lactate normal   - No YASMEEN noted   - Could be second to sepsis vs starvation acidosis?   - Trend VBG with IVF rehydration     # Transaminitis   - ALKPHOS, AST and ALT elevated on admission and trending down   - CT AP with liver showing few granulomas, but bile duct and GB normal   - Hepatitis panel last admission negative   - Likely from  sepsis with cholestasis?  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   - Patient planned to follow up with [facility] Dr Morales, however has not done yet.   - Given acute infection with neutropenic and continues to drop  - Continue on zarxio (2/14 - )    - Trend CBC   - Check ANC     # HTN and HLD   - Continue on norvasc 5   - Monitor BP     # RA   - Seen by Rheum last admission   - Positive RF and CCP  - Positive serologies but lacks joint symptoms  - Supportive care     # Electrolyte dyscrasias  - Monitor and replete electrolytes PRN   - Daily labs    # DVT PPX with LVX    Discussed with Attending.  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 She now presents with fever and constipation. Found with CT AP with acute sigmoid diverticulitis with intramural ill-defined fluid collection, likely developing intramural abscess. Patient admitted to surgery and internal medicine consulted.       # Acute sigmoid diverticulitis with abscess   - Constipation and fevers at home   - CT AP with acute sigmoid diverticulitis with intramural ill-defined fluid collection, likely developing intramural abscess.  - Neutropenia on admission   - BM noted on admission without blood and brown   - CXR, Flu/COVID, UCx, BCx and UA negative   - Incidental breakthrough low grade temp on 2/16 overnight, however no further temperatures and overall appears nontoxic   - IF continues to spike then reculture and RPT CT.   - Continue on zosyn (2/14 - ) for now as per ID   - Was NPO and on IVF, however advanced to low fiber and tolerating well.   - Monitor abdomen   - Monitor BMs   - Care per surgery   - Outpatient GI follow up for possible colonoscopy once cleared from surgical standpoint     # Hyponatremia likely second to dehydration with fevers vs SIADH?   - Improved with hydration   - Sodium continues to wax and wane   - Urine concentrated and previous hospitalized serum osmo low with concern for SIADH.   - Monitor sodium, repeat serumo osmo, and if sodium continues to fall then start on fluid restriction +/- salt tabs.   - Avoid over correction of > 6-8mmol/L in 24 hours   - Renal following.     # Compensated metabolic acidosis   - HCO3 19 on gas and VBG with 7.40/28/17 on admission   - Lactate normal   - No YASMEEN noted   - Could be second to sepsis vs starvation acidosis?   - Trend VBG with IVF rehydration     # Transaminitis   - ALKPHOS, AST and ALT elevated on admission and trending down   - CT AP with liver showing few granulomas, but bile duct and GB normal   - Hepatitis panel last admission negative   - Likely from  sepsis with cholestasis?  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   - Patient planned to follow up with [facility] Dr Morales, however has not done yet.   - Given acute infection with neutropenia completed zarxio (2/14 - 2/16), however WBC continues to drop and zarxio resumed (2/18 - )   - Trend CBC   - Check ANC     # HTN and HLD   - Continue on norvasc 5   - Monitor BP     # RA   - Seen by Rheum last admission   - Positive RF and CCP  - Positive serologies but lacks joint symptoms  - Supportive care     # Electrolyte dyscrasias  - Monitor and replete electrolytes PRN   - Daily labs    # DVT PPX with LVX    Discussed with Attending.

## 2025-02-18 NOTE — PROGRESS NOTE ADULT - SUBJECTIVE AND OBJECTIVE BOX
HAIM Fletcher CNP assessed patient in triage and determined patient Urgent Care appropriate. Will be seen in Urgent Care.          [facility] DIVISION OF KIDNEY DISEASES AND HYPERTENSION --    Reason for consult: Hyponatremia    24 hour events/subjective: Patient seen and examined at bedside. PO intake improving, still having loose stools and mild abd pain      PAST HISTORY  --------------------------------------------------------------------------------  No significant changes to PMH, PSH, FHx, SHx, unless otherwise noted    ALLERGIES & MEDICATIONS  --------------------------------------------------------------------------------  Allergies    No Known Allergies      Standing Inpatient Medications  amLODIPine   Tablet 5 milliGRAM(s) Oral daily  enoxaparin Injectable 40 milliGRAM(s) SubCutaneous every 24 hours  filgrastim-sndz (ZARXIO) Injectable 480 MICROGram(s) SubCutaneous daily  influenza  Vaccine (HIGH DOSE) 0.5 milliLiter(s) IntraMuscular once  piperacillin/tazobactam IVPB.. 3.375 Gram(s) IV Intermittent every 8 hours    PRN Inpatient Medications  acetaminophen     Tablet .. 975 milliGRAM(s) Oral every 6 hours PRN  HYDROmorphone  Injectable 0.2 milliGRAM(s) IV Push every 4 hours PRN  HYDROmorphone  Injectable 0.5 milliGRAM(s) IV Push every 4 hours PRN  ondansetron Injectable 4 milliGRAM(s) IV Push every 8 hours PRN      REVIEW OF SYSTEMS  --------------------------------------------------------------------------------  Gen: No fever  Respiratory: No dyspnea  CV: No chest pain  GI: No abdominal pain, diarrhea, constipation, nausea, vomiting  : No increased frequency, dysuria, hematuria  Skin: No rashes  MSK: No joint pain/swelling; no back pain, no edema  Neuro: No dizziness/lightheadedness    All other systems were reviewed and are negative, except as noted.    VITALS/PHYSICAL EXAM  --------------------------------------------------------------------------------  T(C): 37 (02-18-25 @ 16:51), Max: 37.1 (02-17-25 @ 21:30)  HR: 75 (02-18-25 @ 16:51) (73 - 87)  BP: 134/69 (02-18-25 @ 16:51) (128/70 - 155/72)  RR: 18 (02-18-25 @ 16:51) (18 - 18)  SpO2: 96% (02-18-25 @ 16:51) (95% - 98%)  Wt(kg): --        02-17-25 @ 07:01  -  02-18-25 @ 07:00  --------------------------------------------------------  IN: 1350 mL / OUT: 0 mL / NET: 1350 mL    02-18-25 @ 07:01  -  02-18-25 @ 17:47  --------------------------------------------------------  IN: 230 mL / OUT: 2 mL / NET: 228 mL      PHYSICAL EXAM:  Gen: NAD  Neuro: non-focal  HEENT: Anicteric, MMM  Pulm: CTA B/L  CV: +S1S2  Abd: soft, non-tender/non-distended  Extremities: no edema  Skin: Warm    LABS/STUDIES  --------------------------------------------------------------------------------              10.5   1.78  >-----------<  147      [02-18-25 @ 07:16]              32.4     132  |  99  |  16  ----------------------------<  109      [02-18-25 @ 07:16]  3.8   |  20  |  0.90        Ca     9.2     [02-18-25 @ 07:16]      Mg     2.2     [02-18-25 @ 07:16]      Phos  2.8     [02-18-25 @ 07:16]    TPro  6.6  /  Alb  3.1  /  TBili  0.6  /  DBili  x   /  AST  41  /  ALT  37  /  AlkPhos  341  [02-18-25 @ 07:16]      Serum Osmolality 284      [02-18-25 @ 16:25]    Creatinine Trend:  SCr 0.90 [02-18 @ 07:16]  SCr 0.88 [02-17 @ 06:16]  SCr 0.78 [02-16 @ 06:35]  SCr 0.82 [02-15 @ 09:08]  SCr 0.87 [02-15 @ 00:45]    Urine Creatinine 33      [02-14-25 @ 21:13]  Urine Protein 28      [02-14-25 @ 21:13]  Urine Sodium 80      [02-14-25 @ 21:13]  Urine Urea Nitrogen 376      [02-14-25 @ 14:44]  Urine Potassium 27      [02-14-25 @ 21:13]  Urine Osmolality 388      [02-14-25 @ 16:27]    Iron 16, TIBC 229, %sat 7      [01-07-25 @ 07:38]  Ferritin 192      [01-08-25 @ 07:42]  TSH 2.93      [01-06-25 @ 06:11]  Lipid: chol 198, , HDL 59, LDL --      [01-07-25 @ 07:38]      Free Light Chains: kappa 3.67, lambda 3.85, ratio = 0.95      [02-15 @ 09:08]

## 2025-02-18 NOTE — PROGRESS NOTE ADULT - ASSESSMENT
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 Nephrology consulted for hyponatremia.    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 Treated with HTS and fluid restriction. Uosm 442 at that time.  - On this admission, pt with corrected Na of ~125. Given 1L NS bolus on 2/13 and 2/14 was on maintenance NS   - SNa improved to 136 with IVF. Today Na 132. Would start NaCl tabs 2g TID  - Avoid hypotonic infusions. If Zosyn can be mixed in NS instead of D5W, please ask pharmacy to switch.  - Restrict PO fluid intake      Franck Canas, PGY-5  Nephrology Fellow  MS TEAMS preferred  (After 5pm or on weekends, please contact the fellow on call).

## 2025-02-19 ENCOUNTER — TRANSCRIPTION ENCOUNTER (OUTPATIENT)
Age: 80
End: 2025-02-19

## 2025-02-19 VITALS
OXYGEN SATURATION: 96 % | DIASTOLIC BLOOD PRESSURE: 69 MMHG | TEMPERATURE: 98 F | HEART RATE: 79 BPM | RESPIRATION RATE: 18 BRPM | SYSTOLIC BLOOD PRESSURE: 135 MMHG

## 2025-02-19 LAB
ANION GAP SERPL CALC-SCNC: 13 MMOL/L — SIGNIFICANT CHANGE UP (ref 5–17)
ANISOCYTOSIS BLD QL: SLIGHT — SIGNIFICANT CHANGE UP
BASOPHILS # BLD AUTO: 0.02 K/UL — SIGNIFICANT CHANGE UP (ref 0–0.2)
BASOPHILS NFR BLD AUTO: 0.9 % — SIGNIFICANT CHANGE UP (ref 0–2)
BUN SERPL-MCNC: 19 MG/DL — SIGNIFICANT CHANGE UP (ref 7–23)
CALCIUM SERPL-MCNC: 9.4 MG/DL — SIGNIFICANT CHANGE UP (ref 8.4–10.5)
CHLORIDE SERPL-SCNC: 98 MMOL/L — SIGNIFICANT CHANGE UP (ref 96–108)
CO2 SERPL-SCNC: 22 MMOL/L — SIGNIFICANT CHANGE UP (ref 22–31)
CREAT SERPL-MCNC: 1.11 MG/DL — SIGNIFICANT CHANGE UP (ref 0.5–1.3)
CULTURE RESULTS: SIGNIFICANT CHANGE UP
CULTURE RESULTS: SIGNIFICANT CHANGE UP
DACRYOCYTES BLD QL SMEAR: SLIGHT — SIGNIFICANT CHANGE UP
EGFR: 50 ML/MIN/1.73M2 — LOW
EOSINOPHIL # BLD AUTO: 0.07 K/UL — SIGNIFICANT CHANGE UP (ref 0–0.5)
EOSINOPHIL NFR BLD AUTO: 2.6 % — SIGNIFICANT CHANGE UP (ref 0–6)
GLUCOSE SERPL-MCNC: 94 MG/DL — SIGNIFICANT CHANGE UP (ref 70–99)
HCT VFR BLD CALC: 32.8 % — LOW (ref 34.5–45)
HGB BLD-MCNC: 10.4 G/DL — LOW (ref 11.5–15.5)
LYMPHOCYTES # BLD AUTO: 1.57 K/UL — SIGNIFICANT CHANGE UP (ref 1–3.3)
LYMPHOCYTES # BLD AUTO: 60.8 % — HIGH (ref 13–44)
MACROCYTES BLD QL: SLIGHT — SIGNIFICANT CHANGE UP
MAGNESIUM SERPL-MCNC: 2.2 MG/DL — SIGNIFICANT CHANGE UP (ref 1.6–2.6)
MANUAL SMEAR VERIFICATION: SIGNIFICANT CHANGE UP
MCHC RBC-ENTMCNC: 26.7 PG — LOW (ref 27–34)
MCHC RBC-ENTMCNC: 31.7 G/DL — LOW (ref 32–36)
MCV RBC AUTO: 84.3 FL — SIGNIFICANT CHANGE UP (ref 80–100)
MONOCYTES # BLD AUTO: 0.9 K/UL — SIGNIFICANT CHANGE UP (ref 0–0.9)
MONOCYTES NFR BLD AUTO: 34.8 % — HIGH (ref 2–14)
NEUTROPHILS # BLD AUTO: 0.02 K/UL — LOW (ref 1.8–7.4)
NEUTROPHILS NFR BLD AUTO: 0.9 % — LOW (ref 43–77)
NRBC # BLD: 1 /100 WBCS — HIGH (ref 0–0)
NRBC BLD-RTO: 1 /100 WBCS — HIGH (ref 0–0)
OVALOCYTES BLD QL SMEAR: SLIGHT — SIGNIFICANT CHANGE UP
PHOSPHATE SERPL-MCNC: 2.8 MG/DL — SIGNIFICANT CHANGE UP (ref 2.5–4.5)
PLAT MORPH BLD: ABNORMAL
PLATELET # BLD AUTO: 141 K/UL — LOW (ref 150–400)
POIKILOCYTOSIS BLD QL AUTO: SLIGHT — SIGNIFICANT CHANGE UP
POLYCHROMASIA BLD QL SMEAR: SLIGHT — SIGNIFICANT CHANGE UP
POTASSIUM SERPL-MCNC: 4.3 MMOL/L — SIGNIFICANT CHANGE UP (ref 3.5–5.3)
POTASSIUM SERPL-SCNC: 4.3 MMOL/L — SIGNIFICANT CHANGE UP (ref 3.5–5.3)
RBC # BLD: 3.89 M/UL — SIGNIFICANT CHANGE UP (ref 3.8–5.2)
RBC # FLD: 14.8 % — HIGH (ref 10.3–14.5)
RBC BLD AUTO: ABNORMAL
SMUDGE CELLS # BLD: PRESENT — SIGNIFICANT CHANGE UP
SODIUM SERPL-SCNC: 133 MMOL/L — LOW (ref 135–145)
SPECIMEN SOURCE: SIGNIFICANT CHANGE UP
SPECIMEN SOURCE: SIGNIFICANT CHANGE UP
WBC # BLD: 2.59 K/UL — LOW (ref 3.8–10.5)
WBC # FLD AUTO: 2.59 K/UL — LOW (ref 3.8–10.5)

## 2025-02-19 PROCEDURE — 84132 ASSAY OF SERUM POTASSIUM: CPT

## 2025-02-19 PROCEDURE — 87637 SARSCOV2&INF A&B&RSV AMP PRB: CPT

## 2025-02-19 PROCEDURE — 82570 ASSAY OF URINE CREATININE: CPT

## 2025-02-19 PROCEDURE — 84300 ASSAY OF URINE SODIUM: CPT

## 2025-02-19 PROCEDURE — 83605 ASSAY OF LACTIC ACID: CPT

## 2025-02-19 PROCEDURE — 82784 ASSAY IGA/IGD/IGG/IGM EACH: CPT

## 2025-02-19 PROCEDURE — 84295 ASSAY OF SERUM SODIUM: CPT

## 2025-02-19 PROCEDURE — 84133 ASSAY OF URINE POTASSIUM: CPT

## 2025-02-19 PROCEDURE — 83735 ASSAY OF MAGNESIUM: CPT

## 2025-02-19 PROCEDURE — 82435 ASSAY OF BLOOD CHLORIDE: CPT

## 2025-02-19 PROCEDURE — 85610 PROTHROMBIN TIME: CPT

## 2025-02-19 PROCEDURE — 85025 COMPLETE CBC W/AUTO DIFF WBC: CPT

## 2025-02-19 PROCEDURE — 85730 THROMBOPLASTIN TIME PARTIAL: CPT

## 2025-02-19 PROCEDURE — 82947 ASSAY GLUCOSE BLOOD QUANT: CPT

## 2025-02-19 PROCEDURE — 99231 SBSQ HOSP IP/OBS SF/LOW 25: CPT | Mod: GC

## 2025-02-19 PROCEDURE — 85027 COMPLETE CBC AUTOMATED: CPT

## 2025-02-19 PROCEDURE — 83521 IG LIGHT CHAINS FREE EACH: CPT

## 2025-02-19 PROCEDURE — 84540 ASSAY OF URINE/UREA-N: CPT

## 2025-02-19 PROCEDURE — 82330 ASSAY OF CALCIUM: CPT

## 2025-02-19 PROCEDURE — 71045 X-RAY EXAM CHEST 1 VIEW: CPT

## 2025-02-19 PROCEDURE — 87086 URINE CULTURE/COLONY COUNT: CPT

## 2025-02-19 PROCEDURE — 80048 BASIC METABOLIC PNL TOTAL CA: CPT

## 2025-02-19 PROCEDURE — 96374 THER/PROPH/DIAG INJ IV PUSH: CPT

## 2025-02-19 PROCEDURE — 80053 COMPREHEN METABOLIC PANEL: CPT

## 2025-02-19 PROCEDURE — 85018 HEMOGLOBIN: CPT

## 2025-02-19 PROCEDURE — 84100 ASSAY OF PHOSPHORUS: CPT

## 2025-02-19 PROCEDURE — 85014 HEMATOCRIT: CPT

## 2025-02-19 PROCEDURE — 81001 URINALYSIS AUTO W/SCOPE: CPT

## 2025-02-19 PROCEDURE — 83930 ASSAY OF BLOOD OSMOLALITY: CPT

## 2025-02-19 PROCEDURE — 74177 CT ABD & PELVIS W/CONTRAST: CPT | Mod: MC

## 2025-02-19 PROCEDURE — 96375 TX/PRO/DX INJ NEW DRUG ADDON: CPT

## 2025-02-19 PROCEDURE — 80076 HEPATIC FUNCTION PANEL: CPT

## 2025-02-19 PROCEDURE — 82803 BLOOD GASES ANY COMBINATION: CPT

## 2025-02-19 PROCEDURE — 83935 ASSAY OF URINE OSMOLALITY: CPT

## 2025-02-19 PROCEDURE — 36415 COLL VENOUS BLD VENIPUNCTURE: CPT

## 2025-02-19 PROCEDURE — 99285 EMERGENCY DEPT VISIT HI MDM: CPT

## 2025-02-19 PROCEDURE — 84156 ASSAY OF PROTEIN URINE: CPT

## 2025-02-19 PROCEDURE — 87040 BLOOD CULTURE FOR BACTERIA: CPT

## 2025-02-19 RX ORDER — AMOXICILLIN AND CLAVULANATE POTASSIUM 500; 125 MG/1; MG/1
1 TABLET, FILM COATED ORAL EVERY 12 HOURS
Refills: 0 | Status: DISCONTINUED | OUTPATIENT
Start: 2025-02-19 | End: 2025-02-19

## 2025-02-19 RX ORDER — AMOXICILLIN AND CLAVULANATE POTASSIUM 500; 125 MG/1; MG/1
1 TABLET, FILM COATED ORAL
Qty: 18 | Refills: 0
Start: 2025-02-19 | End: 2025-02-27

## 2025-02-19 RX ORDER — ACETAMINOPHEN 500 MG/5ML
3 LIQUID (ML) ORAL
Qty: 0 | Refills: 0 | DISCHARGE
Start: 2025-02-19

## 2025-02-19 RX ADMIN — AMLODIPINE BESYLATE 5 MILLIGRAM(S): 10 TABLET ORAL at 05:22

## 2025-02-19 RX ADMIN — FILGRASTIM 480 MICROGRAM(S): 300 INJECTION, SOLUTION INTRAVENOUS; SUBCUTANEOUS at 12:04

## 2025-02-19 RX ADMIN — Medication 25 GRAM(S): at 05:22

## 2025-02-19 NOTE — DISCHARGE NOTE PROVIDER - CARE PROVIDER_API CALL
Jonathon Graff  Colon/Rectal Surgery  [address], [address]  [place] [address]  Phone: [phone]  Fax: [phone]  Follow Up Time: 2 weeks

## 2025-02-19 NOTE — PROGRESS NOTE ADULT - SUBJECTIVE AND OBJECTIVE BOX
GENERAL SURGERY PROGRESS NOTE    PAVEL LEYVA  |  [id number]      S: NAEON.     O:   Vital Signs Last 24 Hrs  T(C): 37.2 (18 Feb 2025 21:28), Max: 37.2 (18 Feb 2025 21:28)  T(F): 98.9 (18 Feb 2025 21:28), Max: 98.9 (18 Feb 2025 21:28)  HR: 76 (18 Feb 2025 21:28) (73 - 82)  BP: 128/67 (18 Feb 2025 21:28) (128/67 - 142/68)  BP(mean): --  RR: 18 (18 Feb 2025 21:28) (18 - 18)  SpO2: 97% (18 Feb 2025 21:28) (95% - 98%)    Parameters below as of 18 Feb 2025 21:28  Patient On (Oxygen Delivery Method): room air                            10.5   1.78  )-----------( 147      ( 18 Feb 2025 07:16 )             32.4     02-18    132[L]  |  99  |  16  ----------------------------<  109[H]  3.8   |  20[L]  |  0.90    Ca    9.2      18 Feb 2025 07:16  Phos  2.8     02-18  Mg     2.2     02-18    TPro  6.6  /  Alb  3.1[L]  /  TBili  0.6  /  DBili  x   /  AST  41[H]  /  ALT  37  /  AlkPhos  341[H]  02-18 02-17-25 @ 07:01  -  02-18-25 @ 07:00  --------------------------------------------------------  IN: 1350 mL / OUT: 0 mL / NET: 1350 mL    02-18-25 @ 07:01  -  02-19-25 @ 02:14  --------------------------------------------------------  IN: 1030 mL / OUT: 2 mL / NET: 1028 mL        PHYSICAL EXAM:  CONSTITUTIONAL: Well groomed, NAD  HEENT: NC/AT, no scleral icterus  RESP: Nonlabored breathing on RA  CV: Hemodynamically stable  GI: Soft, mildly distended, mild LLQ TTP. 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 In ED febrile to Tmax 103 and leukopenic, otherwise stable. Denies pain but LLQ TTP on exam. CT with acute sigmoid diverticulitis and developing intramural abscess, likely Hinchey 1b    Plan:  - ABX: c/w zosyn per ID, rpt RVP neg, if afebrile for 48h and benign abd exam, can d/c on 2 weeks augmentin >> will consider repeat scan if abd pain worsens/spikes fever   - hyponatremia resolving, appreciate medicine and nephro recs  - heme/onc following for leukemia: started on Zarxio and to continue until ANC > 1500, inc to 480 mcg today, outpatient followup at discharge  - Pain: multimodal  - Diet: LRD    [facility] Surgery  [id number] GENERAL SURGERY PROGRESS NOTE    PAVEL LEYVA  |  [id number]      S: NAEON.     O:   Vital Signs Last 24 Hrs  T(C): 37.2 (18 Feb 2025 21:28), Max: 37.2 (18 Feb 2025 21:28)  T(F): 98.9 (18 Feb 2025 21:28), Max: 98.9 (18 Feb 2025 21:28)  HR: 76 (18 Feb 2025 21:28) (73 - 82)  BP: 128/67 (18 Feb 2025 21:28) (128/67 - 142/68)  BP(mean): --  RR: 18 (18 Feb 2025 21:28) (18 - 18)  SpO2: 97% (18 Feb 2025 21:28) (95% - 98%)    Parameters below as of 18 Feb 2025 21:28  Patient On (Oxygen Delivery Method): room air                            10.5   1.78  )-----------( 147      ( 18 Feb 2025 07:16 )             32.4     02-18    132[L]  |  99  |  16  ----------------------------<  109[H]  3.8   |  20[L]  |  0.90    Ca    9.2      18 Feb 2025 07:16  Phos  2.8     02-18  Mg     2.2     02-18    TPro  6.6  /  Alb  3.1[L]  /  TBili  0.6  /  DBili  x   /  AST  41[H]  /  ALT  37  /  AlkPhos  341[H]  02-18 02-17-25 @ 07:01  -  02-18-25 @ 07:00  --------------------------------------------------------  IN: 1350 mL / OUT: 0 mL / NET: 1350 mL    02-18-25 @ 07:01  -  02-19-25 @ 02:14  --------------------------------------------------------  IN: 1030 mL / OUT: 2 mL / NET: 1028 mL        PHYSICAL EXAM:  CONSTITUTIONAL: Well groomed, NAD  HEENT: NC/AT, no scleral icterus  RESP: Nonlabored breathing on RA  CV: Hemodynamically stable  GI: Soft, mildly distended, mild LLQ TTP. No rebound/guarding  MSK: Grossly symmetric WWP  NEURO: CN II-XII intact; sensation intact in upper and lower extremities b/l   PSYCH: Awake, A+O x 3, calm

## 2025-02-19 NOTE — DISCHARGE NOTE NURSING/CASE MANAGEMENT/SOCIAL WORK - NSDCPEFALRISK_GEN_ALL_CORE
For information on Fall & Injury Prevention, visit: [email or web address] OR  [email or web address] OR  https://www.cdc.gov/steadi/patient.html

## 2025-02-19 NOTE — DISCHARGE NOTE PROVIDER - NSDCACTIVITY_GEN_ALL_CORE
Walking - Indoors allowed/Walking - Outdoors allowed/Follow Instructions Provided by your Surgical Team/Activity as tolerated

## 2025-02-19 NOTE — DISCHARGE NOTE PROVIDER - NSDCCPCAREPLAN_GEN_ALL_CORE_FT
PRINCIPAL DISCHARGE DIAGNOSIS  Diagnosis: Neutropenic fever  Assessment and Plan of Treatment: You were started on IV antibiotics and transitioned to Augmentin 875 twice a day, continue taking this medication for two weeks total after your are diacharged. If you have increased or worsening abdominal pain, bloody stool, chest pain, dizziness, visual changes please return to the emergency room immediately. Please follow up with Dr. Graff in 1-2 weeks as well as your oncologiast as scheduled.     PRINCIPAL DISCHARGE DIAGNOSIS  Diagnosis: Diverticulitis  Assessment and Plan of Treatment: You were started on IV antibiotics while you were admitted. Please continue taking the antibiotic Augmentin 875mg twice daily for a total of 2 weeks. Please follow up with Dr. Graff in 1-2 weeks after you are discharged. Please follow up with oncology within 1 weeks after discharge. Please follow up with your primary care provider in 1-2 weeks after your discharge. If you develop fever, chills, nausea, vomiting, bloody bowel movements, dizziness, lightheadeness, please return to the emergency room immediately     PRINCIPAL DISCHARGE DIAGNOSIS  Diagnosis: Diverticulitis  Assessment and Plan of Treatment: You were started on IV antibiotics while you were admitted. Please continue taking the antibiotic Augmentin 875mg twice daily for a total of 2 weeks. Please follow up with Dr. Graff in 1-2 weeks after you are discharged. Please follow up with oncology within 1 weeks after discharge. Please follow up with your primary care provider in 1-2 weeks after your discharge. If you develop fever, chills, nausea, vomiting, bloody bowel movements, dizziness, lightheadeness, please return to the emergency room immediately. Please follow up with Dr. White (oncology) on 2/21/25. You do not need to continue taking Zarxio per the oncology team.

## 2025-02-19 NOTE — PROGRESS NOTE ADULT - ASSESSMENT
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 She now presents with fever and constipation. Found with CT AP with acute sigmoid diverticulitis with intramural ill-defined fluid collection, likely developing intramural abscess. Patient admitted to surgery and internal medicine consulted.       # Acute sigmoid diverticulitis with abscess   - Constipation and fevers at home   - CT AP with acute sigmoid diverticulitis with intramural ill-defined fluid collection, likely developing intramural abscess.  - Neutropenia on admission   - BM noted on admission without blood and brown   - CXR, Flu/COVID, UCx, BCx and UA negative   - Incidental breakthrough low grade temp on 2/16 overnight, however no further temperatures and overall appears nontoxic   - Was NPO and on IVF, however advanced to low fiber and tolerating well.   - Continue on zosyn (2/14 - 2/19) and then changed to augmentin for continued course outpatient for now as per ID for 2 week course  - Monitor abdomen   - Monitor BMs   - Care per surgery   - Outpatient GI follow up for possible colonoscopy once cleared from surgical standpoint     # Hyponatremia likely second to dehydration with fevers vs SIADH?   - Improved with hydration   - Sodium continues to wax and wane   - Urine concentrated and previous hospitalized serum osmo low with concern for SIADH.   - Continue on fluid restriction and salt tab  - Monitor sodium   - Avoid over correction of > 6-8mmol/L in 24 hours   - Renal following.     # Compensated metabolic acidosis   - HCO3 19 on gas and VBG with 7.40/28/17 on admission   - Lactate normal   - No YASMEEN noted   - Could be second to sepsis vs starvation acidosis?   - Trend VBG with IVF rehydration     # Transaminitis   - ALKPHOS, AST and ALT elevated on admission and trending down   - CT AP with liver showing few granulomas, but bile duct and GB normal   - Hepatitis panel last admission negative   - Likely from  sepsis with cholestasis?  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   - Patient planned to follow up with [facility] Dr Morales, however has not done yet.   - Given acute infection with neutropenia completed zarxio (2/14 - 2/16), however WBC continues to drop and zarxio resumed (2/18 - )   - Trend CBC   - Check ANC     # HTN and HLD   - Continue on norvasc 5   - Monitor BP     # RA   - Seen by Rheum last admission   - Positive RF and CCP  - Positive serologies but lacks joint symptoms  - Supportive care     # Electrolyte dyscrasias  - Monitor and replete electrolytes PRN   - Daily labs    # DVT PPX with LVX    # DISPO - per surgery   - Will need RPT BMP outpatient given SIADH   - Will need RPT CBC with DIFF and hemeONC follow up given neutropenia/ leukemia     Discussed with Attending.

## 2025-02-19 NOTE — PROGRESS NOTE ADULT - ASSESSMENT
Assessment and Plan:   79F with history of HTN, RA, recently diagnosed T-LGL leukemia not yet on treatment presenting for 2 days of fevers and constipation/tenesmus. In ED febrile to Tmax 103 and leukopenic, otherwise stable. Denies pain but LLQ TTP on exam. CT with acute sigmoid diverticulitis and developing intramural abscess, likely Hinchey 1b    Plan:  - Stable for DC today with augmentin x2 weeks and Zarxio per heme/onc with outpt follow up  - ABX: c/w zosyn per ID, rpt RVP neg, if afebrile for 48h and benign abd exam, can d/c on 2 weeks augmentin >> will consider repeat scan if abd pain worsens/spikes fever   - hyponatremia resolving, appreciate medicine and nephro recs  - heme/onc following for leukemia: started on Zarxio and to continue until ANC > 1500, inc to 480 mcg today, outpatient followup at discharge  - Pain: multimodal  - Diet: LRD      [facility]  [id number]

## 2025-02-19 NOTE — PROGRESS NOTE ADULT - REASON FOR ADMISSION
diverticulitis

## 2025-02-19 NOTE — PROGRESS NOTE ADULT - SUBJECTIVE AND OBJECTIVE BOX
[facility] DIVISION OF KIDNEY DISEASES AND HYPERTENSION --    Reason for consult: Hyponatremia    24 hour events/subjective: Patient seen and examined at bedside. PO intake improving, abd pain and BMs improving. No nausea or vomiting or SOB.      PAST HISTORY  --------------------------------------------------------------------------------  No significant changes to PMH, PSH, FHx, SHx, unless otherwise noted    ALLERGIES & MEDICATIONS  --------------------------------------------------------------------------------  Allergies    No Known Allergies      Standing Inpatient Medications  amLODIPine   Tablet 5 milliGRAM(s) Oral daily  amoxicillin  875 milliGRAM(s)/clavulanate 1 Tablet(s) Oral every 12 hours  enoxaparin Injectable 40 milliGRAM(s) SubCutaneous every 24 hours  filgrastim-sndz (ZARXIO) Injectable 480 MICROGram(s) SubCutaneous daily  influenza  Vaccine (HIGH DOSE) 0.5 milliLiter(s) IntraMuscular once  sodium chloride 2 Gram(s) Oral three times a day    PRN Inpatient Medications  acetaminophen     Tablet .. 975 milliGRAM(s) Oral every 6 hours PRN  HYDROmorphone  Injectable 0.2 milliGRAM(s) IV Push every 4 hours PRN  HYDROmorphone  Injectable 0.5 milliGRAM(s) IV Push every 4 hours PRN  ondansetron Injectable 4 milliGRAM(s) IV Push every 8 hours PRN      REVIEW OF SYSTEMS  --------------------------------------------------------------------------------  Gen: No fever  Respiratory: No dyspnea  CV: No chest pain  GI: No abdominal pain, diarrhea, constipation, nausea, vomiting  : No increased frequency, dysuria, hematuria  Skin: No rashes  MSK: No joint pain/swelling; no back pain, no edema  Neuro: No dizziness/lightheadedness    All other systems were reviewed and are negative, except as noted.    VITALS/PHYSICAL EXAM  --------------------------------------------------------------------------------  T(C): 36.4 (02-19-25 @ 09:02), Max: 37.3 (02-19-25 @ 01:17)  HR: 76 (02-19-25 @ 09:02) (73 - 83)  BP: 130/74 (02-19-25 @ 09:02) (128/67 - 155/74)  RR: 18 (02-19-25 @ 09:02) (18 - 18)  SpO2: 96% (02-19-25 @ 09:02) (94% - 97%)  Wt(kg): --        02-18-25 @ 07:01  -  02-19-25 @ 07:00  --------------------------------------------------------  IN: 1370 mL / OUT: 2 mL / NET: 1368 mL      PHYSICAL EXAM:  Gen: NAD  Neuro: non-focal  HEENT: Anicteric, MMM  Pulm: CTA B/L  CV: +S1S2  Abd: soft, non-tender/non-distended  Extremities: no edema  Skin: Warm    LABS/STUDIES  --------------------------------------------------------------------------------              10.4   2.59  >-----------<  141      [02-19-25 @ 07:20]              32.8     133  |  98  |  19  ----------------------------<  94      [02-19-25 @ 07:19]  4.3   |  22  |  1.11        Ca     9.4     [02-19-25 @ 07:19]      Mg     2.2     [02-19-25 @ 07:19]      Phos  2.8     [02-19-25 @ 07:19]    TPro  6.6  /  Alb  3.1  /  TBili  0.6  /  DBili  x   /  AST  41  /  ALT  37  /  AlkPhos  341  [02-18-25 @ 07:16]    Serum Osmolality 284      [02-18-25 @ 16:25]    Creatinine Trend:  SCr 1.11 [02-19 @ 07:19]  SCr 0.90 [02-18 @ 07:16]  SCr 0.88 [02-17 @ 06:16]  SCr 0.78 [02-16 @ 06:35]  SCr 0.82 [02-15 @ 09:08]    Urine Creatinine 33      [02-14-25 @ 21:13]  Urine Protein 28      [02-14-25 @ 21:13]  Urine Sodium 80      [02-14-25 @ 21:13]  Urine Urea Nitrogen 376      [02-14-25 @ 14:44]  Urine Potassium 27      [02-14-25 @ 21:13]  Urine Osmolality 388      [02-14-25 @ 16:27]    Iron 16, TIBC 229, %sat 7      [01-07-25 @ 07:38]  Ferritin 192      [01-08-25 @ 07:42]  TSH 2.93      [01-06-25 @ 06:11]  Lipid: chol 198, , HDL 59, LDL --      [01-07-25 @ 07:38]      Free Light Chains: kappa 3.67, lambda 3.85, ratio = 0.95      [02-15 @ 09:08]

## 2025-02-19 NOTE — DISCHARGE NOTE PROVIDER - HOSPITAL COURSE
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 In ED febrile to Tmax 103 and leukopenic, otherwise stable. Denies pain but LLQ TTP on exam. Bcx 2/13 ngtd. 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 Nephrology consulted for hyponatremia and recommended starting NaCl tabs 2g TID. Heme/onc following for leukemia: started on Zarxio, outpatient followup at discharge.   Pt stable for DC today with augmentin x2 weeks and Zarxio per heme/onc with outpt follow up. Pt discharged in stable condition, afebrile, pain improved, tolerating diet, voiding.  Pt will f/u with Dr. Graff in 1-2 weeks. 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 In ED febrile to Tmax 103 and leukopenic, otherwise stable. Denies pain but LLQ TTP on exam. Bcx 2/13 ngtd. 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 Nephrology consulted for hyponatremia and recommended starting NaCl tabs 2g TID. Heme/onc following for leukemia: started on Zarxio, outpatient followup at discharge.   Pt stable for DC today with augmentin x2 weeks and Zarxio per heme/onc with outpt follow up. Pt discharged in stable condition, afebrile, pain improved, tolerating diet, voiding.  Pt will f/u with Dr. Graff in 1-2 weeks. Pt should follow up with oncologist in 1-2 weeks after discharge. Pt should follow up with PCP 1-2 weeks after discharge. 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 In ED febrile to Tmax 103 and leukopenic, otherwise stable. Denies pain but LLQ TTP on exam. Bcx 2/13 ngtd. 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 Nephrology consulted for hyponatremia and recommended starting NaCl tabs 2g TID. Heme/onc following for leukemia: started on Zarxio, outpatient followup at discharge.   Pt stable for DC today with augmentin to complete total of 2 week course. Per hem/onc, Zarxio to be stopped on discharge. Pt has outpt follow up scheduled for 2/21/25. Pt discharged in stable condition, afebrile, pain improved, tolerating diet, voiding.  Pt will f/u with Dr. Graff in 1-2 weeks. Pt should follow up with oncologist in 1-2 weeks after discharge. Pt should follow up with PCP 1-2 weeks after discharge.

## 2025-02-19 NOTE — DISCHARGE NOTE NURSING/CASE MANAGEMENT/SOCIAL WORK - FINANCIAL ASSISTANCE
[facility] provides services at a reduced cost to those who are determined to be eligible through [facility]’s financial assistance program. Information regarding [facility]’s financial assistance program can be found by going to [email or web address] or by calling [phone].

## 2025-02-19 NOTE — PROGRESS NOTE ADULT - ASSESSMENT
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 Nephrology consulted for hyponatremia.    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 Treated with HTS and fluid restriction. Uosm 442 at that time.  - On this admission, pt with corrected Na of ~125. Given 1L NS bolus on 2/13 and 2/14 was on maintenance NS   - SNa 133 today. Started on NaCl tabs 2g TID today, can continue on discharge and follow up with PCP who can wean down dose if needed.  - Avoid hypotonic infusions.   - Restrict PO fluid intake      Franck Canas, PGY-5  Nephrology Fellow  MS TEAMS preferred  (After 5pm or on weekends, please contact the fellow on call).

## 2025-02-19 NOTE — PROGRESS NOTE ADULT - PROVIDER SPECIALTY LIST ADULT
Heme/Onc
Infectious Disease
Internal Medicine
Internal Medicine
Surgery
Surgery
Internal Medicine
Surgery
Infectious Disease
Internal Medicine
Nephrology
Surgery
Surgery
Internal Medicine
Nephrology

## 2025-02-19 NOTE — DISCHARGE NOTE PROVIDER - NSDCFUSCHEDAPPT_GEN_ALL_CORE_FT
[facility]is White Physician Pending sale to [facility]  Scheduled Appointment: 02/21/2025    Moris White Physician Pending sale to [facility]  Scheduled Appointment: 02/21/2025

## 2025-02-19 NOTE — PROGRESS NOTE ADULT - SUBJECTIVE AND OBJECTIVE BOX
INTERNAL MEDICINE PROGRESS NOTE     NAME OF PATIENT: PAVEL LEYVA  MRN: [id number]  DATE OF VISIT: 02-18-25 @ 14:44    SUBJECTIVE/ ROS:  - Patient seen and examined by bedside   - Continue on zarxio with slight improvement and hemeONC following.   - Tolerates diet well with good BM and plan for DISPO per surgical team.    OBJECTIVE:  ICU Vital Signs Last 24 Hrs  T(C): 36.8 (18 Feb 2025 13:48), Max: 37.1 (17 Feb 2025 21:30)  T(F): 98.3 (18 Feb 2025 13:48), Max: 98.8 (17 Feb 2025 21:30)  HR: 73 (18 Feb 2025 13:48) (73 - 87)  BP: 132/70 (18 Feb 2025 13:48) (128/70 - 157/79)  BP(mean): --  ABP: --  ABP(mean): --  RR: 18 (18 Feb 2025 13:48) (18 - 18)  SpO2: 97% (18 Feb 2025 13:48) (95% - 98%)    O2 Parameters below as of 18 Feb 2025 13:48  Patient On (Oxygen Delivery Method): room air          02-18-25 @ 14:44  T(C): 36.8 (02-18-25 @ 13:48), Max: 37.1 (02-17-25 @ 21:30)  HR: 73 (02-18-25 @ 13:48) (73 - 87)  BP: 132/70 (02-18-25 @ 13:48) (128/70 - 157/79)  RR: 18 (02-18-25 @ 13:48) (18 - 18)  SpO2: 97% (02-18-25 @ 13:48) (95% - 98%)  Wt(kg): --  CAPILLARY BLOOD GLUCOSE          HOSPITAL MEDICATIONS:  MEDICATIONS  (STANDING):  amLODIPine   Tablet 5 milliGRAM(s) Oral daily  enoxaparin Injectable 40 milliGRAM(s) SubCutaneous every 24 hours  filgrastim-sndz (ZARXIO) Injectable 480 MICROGram(s) SubCutaneous daily  influenza  Vaccine (HIGH DOSE) 0.5 milliLiter(s) IntraMuscular once  piperacillin/tazobactam IVPB.. 3.375 Gram(s) IV Intermittent every 8 hours    MEDICATIONS  (PRN):  acetaminophen     Tablet .. 975 milliGRAM(s) Oral every 6 hours PRN Temp greater or equal to 38C (100.4F), Mild Pain (1 - 3)  HYDROmorphone  Injectable 0.2 milliGRAM(s) IV Push every 4 hours PRN Moderate Pain (4 - 6)  HYDROmorphone  Injectable 0.5 milliGRAM(s) IV Push every 4 hours PRN Severe Pain (7 - 10)  ondansetron Injectable 4 milliGRAM(s) IV Push every 8 hours PRN Nausea and/or Vomiting      PHYSICAL EXAMINATION:  General: NAD   Cardiology: S1/S2 with no murmur   Respiratory: CTA BL with no wheeze   GI: Soft and NTND  Extremities: SIM of BL UE/LE   Neurology: Awake with no acute neurological deficits     LABS:                        10.5   1.78  )-----------( 147      ( 18 Feb 2025 07:16 )             32.4                           10.4   2.59  )-----------( 141      ( 19 Feb 2025 07:20 )             32.8       02-18    132[L]  |  99  |  16  ----------------------------<  109[H]  3.8   |  20[L]  |  0.90    Ca    9.2      18 Feb 2025 07:16  Phos  2.8     02-18  Mg     2.2     02-18    TPro  6.6  /  Alb  3.1[L]  /  TBili  0.6  /  DBili  x   /  AST  41[H]  /  ALT  37  /  AlkPhos  341[H]  02-18      02-19    133[L]  |  98  |  19  ----------------------------<  94  4.3   |  22  |  1.11    Ca    9.4      19 Feb 2025 07:19  Phos  2.8     02-19  Mg     2.2     02-19    TPro  6.6  /  Alb  3.1[L]  /  TBili  0.6  /  DBili  x   /  AST  41[H]  /  ALT  37  /  AlkPhos  341[H]  02-18            MICROBIOLOGY:     RADIOLOGY:    CARDIOLOGY:

## 2025-02-19 NOTE — DISCHARGE NOTE NURSING/CASE MANAGEMENT/SOCIAL WORK - PATIENT PORTAL LINK FT
You can access the FollowMyHealth Patient Portal offered by [facility] by registering at the following website: [email or web address]. By joining [facility]’s FollowMyHealth portal, you will also be able to view your health information using other applications (apps) compatible with our system.

## 2025-02-19 NOTE — DISCHARGE NOTE PROVIDER - NSDCMRMEDTOKEN_GEN_ALL_CORE_FT
amLODIPine 5 mg oral tablet: 1 tab(s) orally once a day  calamine topical lotion: 1 Apply topically to affected area 3 times a day  calamine topical lotion: Apply topically to affected area 3 times a day 1 Apply topically to affected area 3 times a day   acetaminophen 325 mg oral tablet: 3 tab(s) orally every 6 hours As needed Temp greater or equal to 38C (100.4F), Mild Pain (1 - 3)  amLODIPine 5 mg oral tablet: 1 tab(s) orally once a day  amoxicillin-clavulanate 875 mg-125 mg oral tablet: 1 tab(s) orally every 12 hours  calamine topical lotion: Apply topically to affected area 3 times a day 1 Apply topically to affected area 3 times a day  sodium chloride 1 g oral tablet: 2 tab(s) orally 3 times a day

## 2025-02-19 NOTE — CHART NOTE - NSCHARTNOTEFT_GEN_A_CORE
Please DC Zarxio. Unlikely to be of significant benefit for neutropenia given underlying disease, and no need to continue upon discharge. Pt has appointment to see Dr White for followup already 2/21 2PM. No heme contraindication to discharge.    Kimberly Caraballo MD PGY-4  Hematology/Oncology Fellow  Available on MS TEAMS  Pagers: ANH [phone]; [facility] [phone]    **For any questions please check LIAISSATOU on call or [facility] keyonna schedule for heme fellow on call. Please DC Zarxio. Unlikely to be of significant benefit for neutropenia given underlying disease, and no need to continue upon discharge. Pt has appointment to see Dr White for followup already 2/21 2PM. No heme contraindication to discharge.    Hematology to sign off at this time. Please call us back with questions or if patient's status changes.     Kimberly Caraballo MD PGY-4  Hematology/Oncology Fellow  Available on MS TEAMS  Pagers: ANH [phone]; [facility] [phone]    **For any questions please check ANH on call or [facility] keyonna schedule for heme fellow on call. Please DC Zarxio. Unlikely to be of significant benefit for neutropenia given underlying disease, and no need to continue upon discharge. Pt has appointment to see Dr White for followup already 2/21 2PM. No heme contraindication to discharge.    Hematology to sign off at this time. Please call us back with questions or if patient's status changes.     d/w attending Dr Alonso Caraballo MD PGY-4  Hematology/Oncology Fellow  Available on MS TEAMS  Pagers: ANH [phone]; [facility] [phone]    **For any questions please check ANH on call or [facility] schedule for heme fellow on call.

## 2025-02-19 NOTE — PROGRESS NOTE ADULT - ATTENDING COMMENTS
I have seen and examined the patient. I agree with the above surgery resident's note.
I saw and examined the patient, and reviewed  the history with the patient and   Agree with note which was also reviewed and edited where appropriate.  D/W patient, RN, residents and Fellow
No pain and no tenderness.  Continue diet and IV abx.  Waiting further ID and heme/onc recs
I have seen and examined the patient. I agree with the above surgery resident's note.
Julia Oliver MD  Office : [phone]  Contact me on [employer] Teams.
Julia Oliver MD  Office : [phone]  Contact me on [employer] Teams.
I saw and examined the patient, and reviewed  the history with the patient and   Agree with note which was also reviewed and edited where appropriate.  D/W patient, RN, residents and Fellow

## 2025-02-25 ENCOUNTER — RESULT REVIEW (OUTPATIENT)
Age: 80
End: 2025-02-25

## 2025-02-25 ENCOUNTER — APPOINTMENT (OUTPATIENT)
Dept: HEMATOLOGY ONCOLOGY | Facility: CLINIC | Age: 80
End: 2025-02-25
Payer: MEDICARE

## 2025-02-25 ENCOUNTER — APPOINTMENT (OUTPATIENT)
Dept: HEMATOLOGY ONCOLOGY | Facility: CLINIC | Age: 80
End: 2025-02-25

## 2025-02-25 VITALS
SYSTOLIC BLOOD PRESSURE: 158 MMHG | WEIGHT: 121.7 LBS | RESPIRATION RATE: 16 BRPM | OXYGEN SATURATION: 99 % | TEMPERATURE: 98.8 F | HEART RATE: 72 BPM | DIASTOLIC BLOOD PRESSURE: 78 MMHG | BODY MASS INDEX: 22.8 KG/M2

## 2025-02-25 DIAGNOSIS — K57.32 DIVERTICULITIS OF LARGE INTESTINE W/OUT PERFORATION OR ABSCESS W/OUT BLEEDING: ICD-10-CM

## 2025-02-25 PROBLEM — C95.90 LEUKEMIA, UNSPECIFIED NOT HAVING ACHIEVED REMISSION: Chronic | Status: ACTIVE | Noted: 2025-02-14

## 2025-02-25 LAB
BASOPHILS # BLD AUTO: 0.03 K/UL — SIGNIFICANT CHANGE UP (ref 0–0.2)
BASOPHILS NFR BLD AUTO: 1.5 % — SIGNIFICANT CHANGE UP (ref 0–2)
EOSINOPHIL # BLD AUTO: 0.01 K/UL — SIGNIFICANT CHANGE UP (ref 0–0.5)
EOSINOPHIL NFR BLD AUTO: 0.5 % — SIGNIFICANT CHANGE UP (ref 0–6)
HCT VFR BLD CALC: 32.5 % — LOW (ref 34.5–45)
HGB BLD-MCNC: 10.8 G/DL — LOW (ref 11.5–15.5)
LG PLATELETS BLD QL AUTO: SLIGHT — SIGNIFICANT CHANGE UP
LYMPHOCYTES # BLD AUTO: 1.25 K/UL — SIGNIFICANT CHANGE UP (ref 1–3.3)
LYMPHOCYTES # BLD AUTO: 56.5 % — HIGH (ref 13–44)
MCHC RBC-ENTMCNC: 27.8 PG — SIGNIFICANT CHANGE UP (ref 27–34)
MCHC RBC-ENTMCNC: 33.2 G/DL — SIGNIFICANT CHANGE UP (ref 32–36)
MCV RBC AUTO: 83.5 FL — SIGNIFICANT CHANGE UP (ref 80–100)
METAMYELOCYTES # FLD: 0.5 % — HIGH (ref 0–0)
METAMYELOCYTES NFR BLD: 0.5 % — HIGH (ref 0–0)
MONOCYTES # BLD AUTO: 0.39 K/UL — SIGNIFICANT CHANGE UP (ref 0–0.9)
MONOCYTES NFR BLD AUTO: 17.5 % — HIGH (ref 2–14)
NEUTROPHILS # BLD AUTO: 0.52 K/UL — LOW (ref 1.8–7.4)
NEUTROPHILS NFR BLD AUTO: 23.5 % — LOW (ref 43–77)
NRBC # BLD: 0 /100 WBCS — SIGNIFICANT CHANGE UP (ref 0–0)
NRBC BLD AUTO-RTO: SIGNIFICANT CHANGE UP /100 WBCS (ref 0–0)
NRBC BLD-RTO: 0 /100 WBCS — SIGNIFICANT CHANGE UP (ref 0–0)
PLAT MORPH BLD: ABNORMAL
PLATELET # BLD AUTO: 240 K/UL — SIGNIFICANT CHANGE UP (ref 150–400)
RBC # BLD: 3.89 M/UL — SIGNIFICANT CHANGE UP (ref 3.8–5.2)
RBC # FLD: 15.5 % — HIGH (ref 10.3–14.5)
RBC BLD AUTO: SIGNIFICANT CHANGE UP
WBC # BLD: 2.22 K/UL — LOW (ref 3.8–10.5)
WBC # FLD AUTO: 2.22 K/UL — LOW (ref 3.8–10.5)

## 2025-02-25 PROCEDURE — 99215 OFFICE O/P EST HI 40 MIN: CPT

## 2025-02-26 LAB
ALBUMIN SERPL ELPH-MCNC: 3.8 G/DL
ALP BLD-CCNC: 242 U/L
ALT SERPL-CCNC: 24 U/L
ANION GAP SERPL CALC-SCNC: 9 MMOL/L
AST SERPL-CCNC: 31 U/L
BILIRUB SERPL-MCNC: 0.4 MG/DL
BUN SERPL-MCNC: 8 MG/DL
CALCIUM SERPL-MCNC: 10.1 MG/DL
CHLORIDE SERPL-SCNC: 102 MMOL/L
CO2 SERPL-SCNC: 26 MMOL/L
CREAT SERPL-MCNC: 0.78 MG/DL
EGFR: 77 ML/MIN/1.73M2
GLUCOSE SERPL-MCNC: 91 MG/DL
LDH SERPL-CCNC: 149 U/L
POTASSIUM SERPL-SCNC: 4.4 MMOL/L
PROT SERPL-MCNC: 7.5 G/DL
SODIUM SERPL-SCNC: 137 MMOL/L

## 2025-02-27 PROBLEM — K57.32 DIVERTICULITIS, COLON: Status: ACTIVE | Noted: 2025-02-27

## 2025-03-04 ENCOUNTER — APPOINTMENT (OUTPATIENT)
Dept: SURGERY | Facility: CLINIC | Age: 80
End: 2025-03-04
Payer: MEDICARE

## 2025-03-04 ENCOUNTER — NON-APPOINTMENT (OUTPATIENT)
Age: 80
End: 2025-03-04

## 2025-03-04 VITALS
HEIGHT: 62 IN | TEMPERATURE: 98 F | OXYGEN SATURATION: 98 % | RESPIRATION RATE: 17 BRPM | DIASTOLIC BLOOD PRESSURE: 67 MMHG | SYSTOLIC BLOOD PRESSURE: 141 MMHG | BODY MASS INDEX: 22.45 KG/M2 | HEART RATE: 70 BPM | WEIGHT: 122 LBS

## 2025-03-04 DIAGNOSIS — Z78.9 OTHER SPECIFIED HEALTH STATUS: ICD-10-CM

## 2025-03-04 DIAGNOSIS — K57.32 DIVERTICULITIS OF LARGE INTESTINE W/OUT PERFORATION OR ABSCESS W/OUT BLEEDING: ICD-10-CM

## 2025-03-04 PROCEDURE — 99213 OFFICE O/P EST LOW 20 MIN: CPT

## 2025-03-04 RX ORDER — AMOXICILLIN AND CLAVULANATE POTASSIUM 875; 125 MG/1; MG/1
875-125 TABLET, COATED ORAL
Refills: 0 | Status: DISCONTINUED | COMMUNITY
Start: 2025-02-25 | End: 2025-03-04

## 2025-03-04 RX ORDER — NORMAL SALT TABLETS 1 G/G
1 TABLET ORAL
Refills: 0 | Status: DISCONTINUED | COMMUNITY
Start: 2025-02-25 | End: 2025-03-04

## 2025-03-05 DIAGNOSIS — D70.9 NEUTROPENIA, UNSPECIFIED: ICD-10-CM

## 2025-03-06 ENCOUNTER — APPOINTMENT (OUTPATIENT)
Dept: HEMATOLOGY ONCOLOGY | Facility: CLINIC | Age: 80
End: 2025-03-06
Payer: MEDICARE

## 2025-03-06 ENCOUNTER — RESULT REVIEW (OUTPATIENT)
Age: 80
End: 2025-03-06

## 2025-03-06 VITALS
WEIGHT: 120.57 LBS | DIASTOLIC BLOOD PRESSURE: 70 MMHG | RESPIRATION RATE: 16 BRPM | HEART RATE: 63 BPM | TEMPERATURE: 98 F | HEIGHT: 62 IN | BODY MASS INDEX: 22.19 KG/M2 | SYSTOLIC BLOOD PRESSURE: 129 MMHG

## 2025-03-06 DIAGNOSIS — C91.Z0 OTHER LYMPHOID LEUKEMIA NOT HAVING ACHIEVED REMISSION: ICD-10-CM

## 2025-03-06 LAB
ALBUMIN SERPL ELPH-MCNC: 4.1 G/DL
ALP BLD-CCNC: 218 U/L
ALT SERPL-CCNC: 20 U/L
ANION GAP SERPL CALC-SCNC: 8 MMOL/L
AST SERPL-CCNC: 32 U/L
BASOPHILS # BLD AUTO: 0.07 K/UL — SIGNIFICANT CHANGE UP (ref 0–0.2)
BASOPHILS NFR BLD AUTO: 2.8 % — HIGH (ref 0–2)
BILIRUB SERPL-MCNC: 0.4 MG/DL
BUN SERPL-MCNC: 13 MG/DL
CALCIUM SERPL-MCNC: 10.1 MG/DL
CHLORIDE SERPL-SCNC: 102 MMOL/L
CO2 SERPL-SCNC: 26 MMOL/L
CREAT SERPL-MCNC: 0.88 MG/DL
EGFRCR SERPLBLD CKD-EPI 2021: 66 ML/MIN/1.73M2
EOSINOPHIL # BLD AUTO: 0.04 K/UL — SIGNIFICANT CHANGE UP (ref 0–0.5)
EOSINOPHIL NFR BLD AUTO: 1.6 % — SIGNIFICANT CHANGE UP (ref 0–6)
GLUCOSE SERPL-MCNC: 102 MG/DL
HCT VFR BLD CALC: 34.6 % — SIGNIFICANT CHANGE UP (ref 34.5–45)
HGB BLD-MCNC: 11.4 G/DL — LOW (ref 11.5–15.5)
IMM GRANULOCYTES NFR BLD AUTO: 0 % — SIGNIFICANT CHANGE UP (ref 0–0.9)
LDH SERPL-CCNC: 144 U/L
LYMPHOCYTES # BLD AUTO: 1.72 K/UL — SIGNIFICANT CHANGE UP (ref 1–3.3)
LYMPHOCYTES # BLD AUTO: 69.9 % — HIGH (ref 13–44)
MCHC RBC-ENTMCNC: 28.3 PG — SIGNIFICANT CHANGE UP (ref 27–34)
MCHC RBC-ENTMCNC: 32.9 G/DL — SIGNIFICANT CHANGE UP (ref 32–36)
MCV RBC AUTO: 85.9 FL — SIGNIFICANT CHANGE UP (ref 80–100)
MONOCYTES # BLD AUTO: 0.48 K/UL — SIGNIFICANT CHANGE UP (ref 0–0.9)
MONOCYTES NFR BLD AUTO: 19.5 % — HIGH (ref 2–14)
NEUTROPHILS # BLD AUTO: 0.15 K/UL — LOW (ref 1.8–7.4)
NEUTROPHILS NFR BLD AUTO: 6.2 % — LOW (ref 43–77)
NRBC BLD AUTO-RTO: 0 /100 WBCS — SIGNIFICANT CHANGE UP (ref 0–0)
PLATELET # BLD AUTO: 290 K/UL — SIGNIFICANT CHANGE UP (ref 150–400)
POTASSIUM SERPL-SCNC: 4.5 MMOL/L
PROT SERPL-MCNC: 7.9 G/DL
RBC # BLD: 4.03 M/UL — SIGNIFICANT CHANGE UP (ref 3.8–5.2)
RBC # FLD: 16.5 % — HIGH (ref 10.3–14.5)
SODIUM SERPL-SCNC: 136 MMOL/L
WBC # BLD: 2.46 K/UL — LOW (ref 3.8–10.5)
WBC # FLD AUTO: 2.46 K/UL — LOW (ref 3.8–10.5)

## 2025-03-06 PROCEDURE — 99213 OFFICE O/P EST LOW 20 MIN: CPT

## 2025-03-19 ENCOUNTER — RESULT REVIEW (OUTPATIENT)
Age: 80
End: 2025-03-19

## 2025-03-19 ENCOUNTER — APPOINTMENT (OUTPATIENT)
Dept: HEMATOLOGY ONCOLOGY | Facility: CLINIC | Age: 80
End: 2025-03-19
Payer: MEDICARE

## 2025-03-19 ENCOUNTER — OUTPATIENT (OUTPATIENT)
Dept: OUTPATIENT SERVICES | Facility: HOSPITAL | Age: 80
LOS: 1 days | Discharge: HOME | End: 2025-03-19

## 2025-03-19 VITALS
TEMPERATURE: 98.8 F | HEART RATE: 67 BPM | RESPIRATION RATE: 16 BRPM | DIASTOLIC BLOOD PRESSURE: 73 MMHG | WEIGHT: 121.25 LBS | BODY MASS INDEX: 22.18 KG/M2 | OXYGEN SATURATION: 99 % | SYSTOLIC BLOOD PRESSURE: 150 MMHG

## 2025-03-19 DIAGNOSIS — C91.Z0 OTHER LYMPHOID LEUKEMIA NOT HAVING ACHIEVED REMISSION: ICD-10-CM

## 2025-03-19 DIAGNOSIS — D70.9 NEUTROPENIA, UNSPECIFIED: ICD-10-CM

## 2025-03-19 LAB
BASOPHILS # BLD AUTO: 0.03 K/UL — SIGNIFICANT CHANGE UP (ref 0–0.2)
BASOPHILS NFR BLD AUTO: 1.1 % — SIGNIFICANT CHANGE UP (ref 0–2)
EOSINOPHIL # BLD AUTO: 0.03 K/UL — SIGNIFICANT CHANGE UP (ref 0–0.5)
EOSINOPHIL NFR BLD AUTO: 1.1 % — SIGNIFICANT CHANGE UP (ref 0–6)
HCT VFR BLD CALC: 35.9 % — SIGNIFICANT CHANGE UP (ref 34.5–45)
HGB BLD-MCNC: 11.8 G/DL — SIGNIFICANT CHANGE UP (ref 11.5–15.5)
IMM GRANULOCYTES NFR BLD AUTO: 0 % — SIGNIFICANT CHANGE UP (ref 0–0.9)
LYMPHOCYTES # BLD AUTO: 1.74 K/UL — SIGNIFICANT CHANGE UP (ref 1–3.3)
LYMPHOCYTES # BLD AUTO: 61.3 % — HIGH (ref 13–44)
MCHC RBC-ENTMCNC: 28.1 PG — SIGNIFICANT CHANGE UP (ref 27–34)
MCHC RBC-ENTMCNC: 32.9 G/DL — SIGNIFICANT CHANGE UP (ref 32–36)
MCV RBC AUTO: 85.5 FL — SIGNIFICANT CHANGE UP (ref 80–100)
MONOCYTES # BLD AUTO: 0.64 K/UL — SIGNIFICANT CHANGE UP (ref 0–0.9)
MONOCYTES NFR BLD AUTO: 22.5 % — HIGH (ref 2–14)
NEUTROPHILS # BLD AUTO: 0.4 K/UL — LOW (ref 1.8–7.4)
NEUTROPHILS NFR BLD AUTO: 14 % — LOW (ref 43–77)
NRBC BLD AUTO-RTO: 0 /100 WBCS — SIGNIFICANT CHANGE UP (ref 0–0)
PLATELET # BLD AUTO: 234 K/UL — SIGNIFICANT CHANGE UP (ref 150–400)
RBC # BLD: 4.2 M/UL — SIGNIFICANT CHANGE UP (ref 3.8–5.2)
RBC # FLD: 15.9 % — HIGH (ref 10.3–14.5)
WBC # BLD: 2.84 K/UL — LOW (ref 3.8–10.5)
WBC # FLD AUTO: 2.84 K/UL — LOW (ref 3.8–10.5)

## 2025-03-19 PROCEDURE — 99214 OFFICE O/P EST MOD 30 MIN: CPT

## 2025-03-20 LAB
ALBUMIN SERPL ELPH-MCNC: 4.2 G/DL
ALP BLD-CCNC: 191 U/L
ALT SERPL-CCNC: 12 U/L
ANION GAP SERPL CALC-SCNC: 10 MMOL/L
AST SERPL-CCNC: 24 U/L
BILIRUB SERPL-MCNC: 0.5 MG/DL
BUN SERPL-MCNC: 16 MG/DL
CALCIUM SERPL-MCNC: 9.8 MG/DL
CHLORIDE SERPL-SCNC: 100 MMOL/L
CO2 SERPL-SCNC: 22 MMOL/L
CREAT SERPL-MCNC: 0.82 MG/DL
EGFRCR SERPLBLD CKD-EPI 2021: 72 ML/MIN/1.73M2
GLUCOSE SERPL-MCNC: 89 MG/DL
LDH SERPL-CCNC: 146 U/L
POTASSIUM SERPL-SCNC: 4.1 MMOL/L
PROT SERPL-MCNC: 7.7 G/DL
SODIUM SERPL-SCNC: 133 MMOL/L

## 2025-03-28 DIAGNOSIS — C91.Z0 OTHER LYMPHOID LEUKEMIA NOT HAVING ACHIEVED REMISSION: ICD-10-CM

## 2025-04-08 ENCOUNTER — APPOINTMENT (OUTPATIENT)
Dept: HEMATOLOGY ONCOLOGY | Facility: CLINIC | Age: 80
End: 2025-04-08
Payer: MEDICARE

## 2025-04-08 ENCOUNTER — RESULT REVIEW (OUTPATIENT)
Age: 80
End: 2025-04-08

## 2025-04-08 VITALS
HEART RATE: 65 BPM | SYSTOLIC BLOOD PRESSURE: 168 MMHG | RESPIRATION RATE: 16 BRPM | TEMPERATURE: 97.7 F | OXYGEN SATURATION: 98 % | WEIGHT: 121.47 LBS | BODY MASS INDEX: 22.22 KG/M2 | DIASTOLIC BLOOD PRESSURE: 71 MMHG

## 2025-04-08 DIAGNOSIS — D70.9 NEUTROPENIA, UNSPECIFIED: ICD-10-CM

## 2025-04-08 DIAGNOSIS — C91.Z0 OTHER LYMPHOID LEUKEMIA NOT HAVING ACHIEVED REMISSION: ICD-10-CM

## 2025-04-08 LAB
BASOPHILS # BLD AUTO: 0.02 K/UL — SIGNIFICANT CHANGE UP (ref 0–0.2)
BASOPHILS NFR BLD AUTO: 0.8 % — SIGNIFICANT CHANGE UP (ref 0–2)
EOSINOPHIL # BLD AUTO: 0 K/UL — SIGNIFICANT CHANGE UP (ref 0–0.5)
EOSINOPHIL NFR BLD AUTO: 0 % — SIGNIFICANT CHANGE UP (ref 0–6)
HCT VFR BLD CALC: 37.6 % — SIGNIFICANT CHANGE UP (ref 34.5–45)
HGB BLD-MCNC: 12.1 G/DL — SIGNIFICANT CHANGE UP (ref 11.5–15.5)
IMM GRANULOCYTES NFR BLD AUTO: 0 % — SIGNIFICANT CHANGE UP (ref 0–0.9)
LYMPHOCYTES # BLD AUTO: 1.8 K/UL — SIGNIFICANT CHANGE UP (ref 1–3.3)
LYMPHOCYTES # BLD AUTO: 68.7 % — HIGH (ref 13–44)
MCHC RBC-ENTMCNC: 27.8 PG — SIGNIFICANT CHANGE UP (ref 27–34)
MCHC RBC-ENTMCNC: 32.2 G/DL — SIGNIFICANT CHANGE UP (ref 32–36)
MCV RBC AUTO: 86.4 FL — SIGNIFICANT CHANGE UP (ref 80–100)
MONOCYTES # BLD AUTO: 0.52 K/UL — SIGNIFICANT CHANGE UP (ref 0–0.9)
MONOCYTES NFR BLD AUTO: 19.8 % — HIGH (ref 2–14)
NEUTROPHILS # BLD AUTO: 0.28 K/UL — LOW (ref 1.8–7.4)
NEUTROPHILS NFR BLD AUTO: 10.7 % — LOW (ref 43–77)
NRBC BLD AUTO-RTO: 0 /100 WBCS — SIGNIFICANT CHANGE UP (ref 0–0)
PLATELET # BLD AUTO: 222 K/UL — SIGNIFICANT CHANGE UP (ref 150–400)
RBC # BLD: 4.35 M/UL — SIGNIFICANT CHANGE UP (ref 3.8–5.2)
RBC # FLD: 14.6 % — HIGH (ref 10.3–14.5)
WBC # BLD: 2.62 K/UL — LOW (ref 3.8–10.5)
WBC # FLD AUTO: 2.62 K/UL — LOW (ref 3.8–10.5)

## 2025-04-08 PROCEDURE — 99213 OFFICE O/P EST LOW 20 MIN: CPT

## 2025-04-29 NOTE — ED ADULT NURSE NOTE - IN THE PAST 12 MONTHS HAVE YOU USED DRUGS OTHER THAN THOSE REQUIRED FOR MEDICAL REASON?
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 No plan on repeat colonoscopy due to age. There was some inflammation in the stomach but no bacteria causing it- this is common and nothing specific to do for it    No

## 2025-05-13 ENCOUNTER — APPOINTMENT (OUTPATIENT)
Dept: HEMATOLOGY ONCOLOGY | Facility: CLINIC | Age: 80
End: 2025-05-13
Payer: MEDICARE

## 2025-05-13 ENCOUNTER — RESULT REVIEW (OUTPATIENT)
Age: 80
End: 2025-05-13

## 2025-05-13 VITALS
OXYGEN SATURATION: 98 % | SYSTOLIC BLOOD PRESSURE: 141 MMHG | BODY MASS INDEX: 23.39 KG/M2 | WEIGHT: 127.87 LBS | HEART RATE: 71 BPM | RESPIRATION RATE: 16 BRPM | DIASTOLIC BLOOD PRESSURE: 82 MMHG | TEMPERATURE: 97.7 F

## 2025-05-13 DIAGNOSIS — D70.9 NEUTROPENIA, UNSPECIFIED: ICD-10-CM

## 2025-05-13 DIAGNOSIS — C91.Z0 OTHER LYMPHOID LEUKEMIA NOT HAVING ACHIEVED REMISSION: ICD-10-CM

## 2025-05-13 LAB
BASOPHILS # BLD AUTO: 0.03 K/UL — SIGNIFICANT CHANGE UP (ref 0–0.2)
BASOPHILS NFR BLD AUTO: 1 % — SIGNIFICANT CHANGE UP (ref 0–2)
EOSINOPHIL # BLD AUTO: 0.03 K/UL — SIGNIFICANT CHANGE UP (ref 0–0.5)
EOSINOPHIL NFR BLD AUTO: 1 % — SIGNIFICANT CHANGE UP (ref 0–6)
HCT VFR BLD CALC: 37.2 % — SIGNIFICANT CHANGE UP (ref 34.5–45)
HGB BLD-MCNC: 12 G/DL — SIGNIFICANT CHANGE UP (ref 11.5–15.5)
IMM GRANULOCYTES NFR BLD AUTO: 0 % — SIGNIFICANT CHANGE UP (ref 0–0.9)
LYMPHOCYTES # BLD AUTO: 2.14 K/UL — SIGNIFICANT CHANGE UP (ref 1–3.3)
LYMPHOCYTES # BLD AUTO: 72.5 % — HIGH (ref 13–44)
MCHC RBC-ENTMCNC: 26.8 PG — LOW (ref 27–34)
MCHC RBC-ENTMCNC: 32.3 G/DL — SIGNIFICANT CHANGE UP (ref 32–36)
MCV RBC AUTO: 83 FL — SIGNIFICANT CHANGE UP (ref 80–100)
MONOCYTES # BLD AUTO: 0.72 K/UL — SIGNIFICANT CHANGE UP (ref 0–0.9)
MONOCYTES NFR BLD AUTO: 24.4 % — HIGH (ref 2–14)
NEUTROPHILS # BLD AUTO: 0.03 K/UL — LOW (ref 1.8–7.4)
NEUTROPHILS NFR BLD AUTO: 1.1 % — LOW (ref 43–77)
NRBC BLD AUTO-RTO: 0 /100 WBCS — SIGNIFICANT CHANGE UP (ref 0–0)
PLATELET # BLD AUTO: 224 K/UL — SIGNIFICANT CHANGE UP (ref 150–400)
RBC # BLD: 4.48 M/UL — SIGNIFICANT CHANGE UP (ref 3.8–5.2)
RBC # FLD: 13.4 % — SIGNIFICANT CHANGE UP (ref 10.3–14.5)
WBC # BLD: 2.95 K/UL — LOW (ref 3.8–10.5)
WBC # FLD AUTO: 2.95 K/UL — LOW (ref 3.8–10.5)

## 2025-05-13 PROCEDURE — 99214 OFFICE O/P EST MOD 30 MIN: CPT

## 2025-05-13 RX ORDER — AMLODIPINE BESYLATE 5 MG/1
5 TABLET ORAL DAILY
Refills: 0 | Status: ACTIVE | COMMUNITY
Start: 2025-05-13

## 2025-05-14 LAB
ALBUMIN SERPL ELPH-MCNC: 4.2 G/DL
ALP BLD-CCNC: 257 U/L
ALT SERPL-CCNC: 47 U/L
ANION GAP SERPL CALC-SCNC: 11 MMOL/L
AST SERPL-CCNC: 43 U/L
BILIRUB SERPL-MCNC: 0.4 MG/DL
BUN SERPL-MCNC: 17 MG/DL
CALCIUM SERPL-MCNC: 9.8 MG/DL
CHLORIDE SERPL-SCNC: 99 MMOL/L
CO2 SERPL-SCNC: 23 MMOL/L
CREAT SERPL-MCNC: 1 MG/DL
EGFRCR SERPLBLD CKD-EPI 2021: 57 ML/MIN/1.73M2
GLUCOSE SERPL-MCNC: 102 MG/DL
LDH SERPL-CCNC: 158 U/L
POTASSIUM SERPL-SCNC: 4.6 MMOL/L
PROT SERPL-MCNC: 7.8 G/DL
SODIUM SERPL-SCNC: 133 MMOL/L

## 2025-06-19 ENCOUNTER — OUTPATIENT (OUTPATIENT)
Dept: OUTPATIENT SERVICES | Facility: HOSPITAL | Age: 80
LOS: 1 days | Discharge: HOME | End: 2025-06-19

## 2025-06-19 DIAGNOSIS — C91.Z0 OTHER LYMPHOID LEUKEMIA NOT HAVING ACHIEVED REMISSION: ICD-10-CM

## 2025-06-20 ENCOUNTER — RESULT REVIEW (OUTPATIENT)
Age: 80
End: 2025-06-20

## 2025-06-20 ENCOUNTER — APPOINTMENT (OUTPATIENT)
Dept: HEMATOLOGY ONCOLOGY | Facility: CLINIC | Age: 80
End: 2025-06-20
Payer: MEDICARE

## 2025-06-20 VITALS
RESPIRATION RATE: 16 BRPM | SYSTOLIC BLOOD PRESSURE: 170 MMHG | BODY MASS INDEX: 23.39 KG/M2 | WEIGHT: 127.87 LBS | TEMPERATURE: 97.2 F | HEART RATE: 73 BPM | OXYGEN SATURATION: 98 % | DIASTOLIC BLOOD PRESSURE: 79 MMHG

## 2025-06-20 LAB
BASOPHILS # BLD AUTO: 0.02 K/UL — SIGNIFICANT CHANGE UP (ref 0–0.2)
BASOPHILS NFR BLD AUTO: 0.6 % — SIGNIFICANT CHANGE UP (ref 0–2)
EOSINOPHIL # BLD AUTO: 0.02 K/UL — SIGNIFICANT CHANGE UP (ref 0–0.5)
EOSINOPHIL NFR BLD AUTO: 0.6 % — SIGNIFICANT CHANGE UP (ref 0–6)
HCT VFR BLD CALC: 36.6 % — SIGNIFICANT CHANGE UP (ref 34.5–45)
HGB BLD-MCNC: 11.7 G/DL — SIGNIFICANT CHANGE UP (ref 11.5–15.5)
IMM GRANULOCYTES NFR BLD AUTO: 0 % — SIGNIFICANT CHANGE UP (ref 0–0.9)
LYMPHOCYTES # BLD AUTO: 2.19 K/UL — SIGNIFICANT CHANGE UP (ref 1–3.3)
LYMPHOCYTES # BLD AUTO: 70.2 % — HIGH (ref 13–44)
MCHC RBC-ENTMCNC: 26.1 PG — LOW (ref 27–34)
MCHC RBC-ENTMCNC: 32 G/DL — SIGNIFICANT CHANGE UP (ref 32–36)
MCV RBC AUTO: 81.7 FL — SIGNIFICANT CHANGE UP (ref 80–100)
MONOCYTES # BLD AUTO: 0.85 K/UL — SIGNIFICANT CHANGE UP (ref 0–0.9)
MONOCYTES NFR BLD AUTO: 27.2 % — HIGH (ref 2–14)
NEUTROPHILS # BLD AUTO: 0.04 K/UL — LOW (ref 1.8–7.4)
NEUTROPHILS NFR BLD AUTO: 1.4 % — LOW (ref 43–77)
NRBC BLD AUTO-RTO: 0 /100 WBCS — SIGNIFICANT CHANGE UP (ref 0–0)
PLATELET # BLD AUTO: 215 K/UL — SIGNIFICANT CHANGE UP (ref 150–400)
RBC # BLD: 4.48 M/UL — SIGNIFICANT CHANGE UP (ref 3.8–5.2)
RBC # FLD: 13.8 % — SIGNIFICANT CHANGE UP (ref 10.3–14.5)
WBC # BLD: 3.12 K/UL — LOW (ref 3.8–10.5)
WBC # FLD AUTO: 3.12 K/UL — LOW (ref 3.8–10.5)

## 2025-06-20 PROCEDURE — 99214 OFFICE O/P EST MOD 30 MIN: CPT

## 2025-06-20 RX ORDER — METHOTREXATE 2.5 MG/1
2.5 TABLET ORAL
Qty: 24 | Refills: 5 | Status: ACTIVE | COMMUNITY
Start: 2025-06-20 | End: 1900-01-01

## 2025-06-20 RX ORDER — PREDNISONE 20 MG/1
20 TABLET ORAL
Qty: 1 | Refills: 1 | Status: ACTIVE | COMMUNITY
Start: 2025-06-20 | End: 1900-01-01

## 2025-06-23 LAB
ALBUMIN SERPL ELPH-MCNC: 4 G/DL
ALP BLD-CCNC: 229 U/L
ALT SERPL-CCNC: 28 U/L
ANION GAP SERPL CALC-SCNC: 13 MMOL/L
AST SERPL-CCNC: 28 U/L
BILIRUB SERPL-MCNC: 0.4 MG/DL
BUN SERPL-MCNC: 10 MG/DL
CALCIUM SERPL-MCNC: 9.6 MG/DL
CHLORIDE SERPL-SCNC: 98 MMOL/L
CO2 SERPL-SCNC: 21 MMOL/L
CREAT SERPL-MCNC: 0.93 MG/DL
EGFRCR SERPLBLD CKD-EPI 2021: 62 ML/MIN/1.73M2
GLUCOSE SERPL-MCNC: 109 MG/DL
HBV CORE IGG+IGM SER QL: NONREACTIVE
HBV SURFACE AB SER QL: NONREACTIVE
HBV SURFACE AG SER QL: NONREACTIVE
HCV AB SER QL: NONREACTIVE
HCV S/CO RATIO: 0.35 S/CO
LDH SERPL-CCNC: 157 U/L
POTASSIUM SERPL-SCNC: 4.4 MMOL/L
PROT SERPL-MCNC: 7.6 G/DL
SODIUM SERPL-SCNC: 132 MMOL/L